# Patient Record
Sex: FEMALE | Race: WHITE | NOT HISPANIC OR LATINO | Employment: OTHER | URBAN - METROPOLITAN AREA
[De-identification: names, ages, dates, MRNs, and addresses within clinical notes are randomized per-mention and may not be internally consistent; named-entity substitution may affect disease eponyms.]

---

## 2017-02-04 ENCOUNTER — HOSPITAL ENCOUNTER (EMERGENCY)
Facility: HOSPITAL | Age: 47
Discharge: HOME | End: 2017-02-04
Attending: EMERGENCY MEDICINE | Admitting: EMERGENCY MEDICINE
Payer: COMMERCIAL

## 2017-02-04 ENCOUNTER — APPOINTMENT (EMERGENCY)
Dept: RADIOLOGY | Facility: HOSPITAL | Age: 47
End: 2017-02-04
Payer: COMMERCIAL

## 2017-02-04 VITALS
RESPIRATION RATE: 20 BRPM | TEMPERATURE: 98.6 F | HEART RATE: 108 BPM | WEIGHT: 244 LBS | DIASTOLIC BLOOD PRESSURE: 108 MMHG | OXYGEN SATURATION: 97 % | SYSTOLIC BLOOD PRESSURE: 151 MMHG

## 2017-02-04 DIAGNOSIS — R00.0 TACHYCARDIA: ICD-10-CM

## 2017-02-04 DIAGNOSIS — F41.9 ANXIETY: Primary | ICD-10-CM

## 2017-02-04 LAB
ANION GAP SERPL CALCULATED.3IONS-SCNC: 11 MMOL/L (ref 4–13)
BASOPHILS # BLD AUTO: 0.04 THOUSANDS/ΜL (ref 0–0.1)
BASOPHILS NFR BLD AUTO: 1 % (ref 0–1)
BUN SERPL-MCNC: 14 MG/DL (ref 5–25)
CALCIUM SERPL-MCNC: 9.2 MG/DL (ref 8.3–10.1)
CHLORIDE SERPL-SCNC: 106 MMOL/L (ref 100–108)
CO2 SERPL-SCNC: 25 MMOL/L (ref 21–32)
CREAT SERPL-MCNC: 1.01 MG/DL (ref 0.6–1.3)
EOSINOPHIL # BLD AUTO: 0.18 THOUSAND/ΜL (ref 0–0.61)
EOSINOPHIL NFR BLD AUTO: 2 % (ref 0–6)
ERYTHROCYTE [DISTWIDTH] IN BLOOD BY AUTOMATED COUNT: 13.9 % (ref 11.6–15.1)
GFR SERPL CREATININE-BSD FRML MDRD: 58.5 ML/MIN/1.73SQ M
GLUCOSE SERPL-MCNC: 133 MG/DL (ref 65–140)
HCT VFR BLD AUTO: 43.6 % (ref 34.8–46.1)
HGB BLD-MCNC: 14 G/DL (ref 11.5–15.4)
LYMPHOCYTES # BLD AUTO: 2.13 THOUSANDS/ΜL (ref 0.6–4.47)
LYMPHOCYTES NFR BLD AUTO: 25 % (ref 14–44)
MAGNESIUM SERPL-MCNC: 2.2 MG/DL (ref 1.6–2.6)
MCH RBC QN AUTO: 27.9 PG (ref 26.8–34.3)
MCHC RBC AUTO-ENTMCNC: 32.1 G/DL (ref 31.4–37.4)
MCV RBC AUTO: 87 FL (ref 82–98)
MONOCYTES # BLD AUTO: 0.66 THOUSAND/ΜL (ref 0.17–1.22)
MONOCYTES NFR BLD AUTO: 8 % (ref 4–12)
NEUTROPHILS # BLD AUTO: 5.35 THOUSANDS/ΜL (ref 1.85–7.62)
NEUTS SEG NFR BLD AUTO: 64 % (ref 43–75)
NRBC BLD AUTO-RTO: 0 /100 WBCS
PLATELET # BLD AUTO: 297 THOUSANDS/UL (ref 149–390)
PMV BLD AUTO: 10 FL (ref 8.9–12.7)
POTASSIUM SERPL-SCNC: 3.7 MMOL/L (ref 3.5–5.3)
RBC # BLD AUTO: 5.02 MILLION/UL (ref 3.81–5.12)
SODIUM SERPL-SCNC: 142 MMOL/L (ref 136–145)
SPECIMEN SOURCE: NORMAL
TROPONIN I BLD-MCNC: 0 NG/ML (ref 0–0.08)
TSH SERPL DL<=0.05 MIU/L-ACNC: 0.8 UIU/ML (ref 0.36–3.74)
WBC # BLD AUTO: 8.37 THOUSAND/UL (ref 4.31–10.16)

## 2017-02-04 PROCEDURE — 80048 BASIC METABOLIC PNL TOTAL CA: CPT

## 2017-02-04 PROCEDURE — 36415 COLL VENOUS BLD VENIPUNCTURE: CPT

## 2017-02-04 PROCEDURE — 83735 ASSAY OF MAGNESIUM: CPT | Performed by: EMERGENCY MEDICINE

## 2017-02-04 PROCEDURE — 99285 EMERGENCY DEPT VISIT HI MDM: CPT

## 2017-02-04 PROCEDURE — 71010 HB CHEST X-RAY 1 VIEW FRONTAL (PORTABLE): CPT

## 2017-02-04 PROCEDURE — 85025 COMPLETE CBC W/AUTO DIFF WBC: CPT

## 2017-02-04 PROCEDURE — 84443 ASSAY THYROID STIM HORMONE: CPT | Performed by: EMERGENCY MEDICINE

## 2017-02-04 PROCEDURE — 84484 ASSAY OF TROPONIN QUANT: CPT

## 2017-02-04 PROCEDURE — 93005 ELECTROCARDIOGRAM TRACING: CPT

## 2017-02-04 PROCEDURE — 96360 HYDRATION IV INFUSION INIT: CPT

## 2017-02-04 PROCEDURE — 36415 COLL VENOUS BLD VENIPUNCTURE: CPT | Performed by: EMERGENCY MEDICINE

## 2017-02-04 RX ORDER — LORAZEPAM 1 MG/1
2 TABLET ORAL ONCE
Status: COMPLETED | OUTPATIENT
Start: 2017-02-04 | End: 2017-02-04

## 2017-02-04 RX ADMIN — LORAZEPAM 2 MG: 1 TABLET ORAL at 17:31

## 2017-02-04 RX ADMIN — SODIUM CHLORIDE 1000 ML: 0.9 INJECTION, SOLUTION INTRAVENOUS at 16:44

## 2017-02-05 LAB
ATRIAL RATE: 241 BPM
QRS AXIS: 1 DEGREES
QRSD INTERVAL: 64 MS
QT INTERVAL: 290 MS
QTC INTERVAL: 406 MS
T WAVE AXIS: 32 DEGREES
VENTRICULAR RATE: 118 BPM

## 2017-02-17 ENCOUNTER — ALLSCRIPTS OFFICE VISIT (OUTPATIENT)
Dept: OTHER | Facility: OTHER | Age: 47
End: 2017-02-17

## 2017-02-17 DIAGNOSIS — F41.9 ANXIETY DISORDER: ICD-10-CM

## 2017-02-17 DIAGNOSIS — E66.01 MORBID (SEVERE) OBESITY DUE TO EXCESS CALORIES (HCC): ICD-10-CM

## 2017-02-17 DIAGNOSIS — F32.9 MAJOR DEPRESSIVE DISORDER, SINGLE EPISODE: ICD-10-CM

## 2017-02-17 DIAGNOSIS — R60.0 LOCALIZED EDEMA: ICD-10-CM

## 2017-02-17 DIAGNOSIS — I10 ESSENTIAL (PRIMARY) HYPERTENSION: ICD-10-CM

## 2017-02-20 ENCOUNTER — APPOINTMENT (OUTPATIENT)
Dept: LAB | Facility: CLINIC | Age: 47
End: 2017-02-20
Payer: COMMERCIAL

## 2017-02-20 ENCOUNTER — TRANSCRIBE ORDERS (OUTPATIENT)
Dept: LAB | Facility: CLINIC | Age: 47
End: 2017-02-20

## 2017-02-20 DIAGNOSIS — F41.9 ANXIETY DISORDER: ICD-10-CM

## 2017-02-20 DIAGNOSIS — F32.9 MAJOR DEPRESSIVE DISORDER, SINGLE EPISODE: ICD-10-CM

## 2017-02-20 DIAGNOSIS — R60.0 LOCALIZED EDEMA: ICD-10-CM

## 2017-02-20 DIAGNOSIS — E66.01 MORBID (SEVERE) OBESITY DUE TO EXCESS CALORIES (HCC): ICD-10-CM

## 2017-02-20 DIAGNOSIS — I10 ESSENTIAL (PRIMARY) HYPERTENSION: ICD-10-CM

## 2017-02-20 LAB
25(OH)D3 SERPL-MCNC: 7.4 NG/ML (ref 30–100)
ALBUMIN SERPL BCP-MCNC: 3.6 G/DL (ref 3.5–5)
ALP SERPL-CCNC: 112 U/L (ref 46–116)
ALT SERPL W P-5'-P-CCNC: 16 U/L (ref 12–78)
ANION GAP SERPL CALCULATED.3IONS-SCNC: 6 MMOL/L (ref 4–13)
AST SERPL W P-5'-P-CCNC: 14 U/L (ref 5–45)
BASOPHILS # BLD AUTO: 0.06 THOUSANDS/ΜL (ref 0–0.1)
BASOPHILS NFR BLD AUTO: 1 % (ref 0–1)
BILIRUB SERPL-MCNC: 0.34 MG/DL (ref 0.2–1)
BUN SERPL-MCNC: 20 MG/DL (ref 5–25)
CALCIUM SERPL-MCNC: 9.2 MG/DL (ref 8.3–10.1)
CHLORIDE SERPL-SCNC: 100 MMOL/L (ref 100–108)
CHOLEST SERPL-MCNC: 176 MG/DL (ref 50–200)
CO2 SERPL-SCNC: 32 MMOL/L (ref 21–32)
CREAT SERPL-MCNC: 0.89 MG/DL (ref 0.6–1.3)
CREAT UR-MCNC: 119 MG/DL
EOSINOPHIL # BLD AUTO: 0.27 THOUSAND/ΜL (ref 0–0.61)
EOSINOPHIL NFR BLD AUTO: 4 % (ref 0–6)
ERYTHROCYTE [DISTWIDTH] IN BLOOD BY AUTOMATED COUNT: 13.6 % (ref 11.6–15.1)
EST. AVERAGE GLUCOSE BLD GHB EST-MCNC: 114 MG/DL
GFR SERPL CREATININE-BSD FRML MDRD: >60 ML/MIN/1.73SQ M
GLUCOSE SERPL-MCNC: 81 MG/DL (ref 65–140)
HBA1C MFR BLD: 5.6 % (ref 4.2–6.3)
HCT VFR BLD AUTO: 45.1 % (ref 34.8–46.1)
HDLC SERPL-MCNC: 57 MG/DL (ref 40–60)
HGB BLD-MCNC: 14.7 G/DL (ref 11.5–15.4)
LDLC SERPL CALC-MCNC: 97 MG/DL (ref 0–100)
LYMPHOCYTES # BLD AUTO: 1.89 THOUSANDS/ΜL (ref 0.6–4.47)
LYMPHOCYTES NFR BLD AUTO: 31 % (ref 14–44)
MCH RBC QN AUTO: 28.4 PG (ref 26.8–34.3)
MCHC RBC AUTO-ENTMCNC: 32.6 G/DL (ref 31.4–37.4)
MCV RBC AUTO: 87 FL (ref 82–98)
MICROALBUMIN UR-MCNC: 8.6 MG/L (ref 0–20)
MICROALBUMIN/CREAT 24H UR: 7 MG/G CREATININE (ref 0–30)
MONOCYTES # BLD AUTO: 0.46 THOUSAND/ΜL (ref 0.17–1.22)
MONOCYTES NFR BLD AUTO: 8 % (ref 4–12)
NEUTROPHILS # BLD AUTO: 3.46 THOUSANDS/ΜL (ref 1.85–7.62)
NEUTS SEG NFR BLD AUTO: 56 % (ref 43–75)
NRBC BLD AUTO-RTO: 0 /100 WBCS
PLATELET # BLD AUTO: 305 THOUSANDS/UL (ref 149–390)
PMV BLD AUTO: 10.5 FL (ref 8.9–12.7)
POTASSIUM SERPL-SCNC: 3.6 MMOL/L (ref 3.5–5.3)
PROT SERPL-MCNC: 7.4 G/DL (ref 6.4–8.2)
RBC # BLD AUTO: 5.18 MILLION/UL (ref 3.81–5.12)
SODIUM SERPL-SCNC: 138 MMOL/L (ref 136–145)
T4 FREE SERPL-MCNC: 0.78 NG/DL (ref 0.76–1.46)
TRIGL SERPL-MCNC: 110 MG/DL
TSH SERPL DL<=0.05 MIU/L-ACNC: 1.27 UIU/ML (ref 0.36–3.74)
WBC # BLD AUTO: 6.14 THOUSAND/UL (ref 4.31–10.16)

## 2017-02-20 PROCEDURE — 83036 HEMOGLOBIN GLYCOSYLATED A1C: CPT

## 2017-02-20 PROCEDURE — 84443 ASSAY THYROID STIM HORMONE: CPT

## 2017-02-20 PROCEDURE — 84439 ASSAY OF FREE THYROXINE: CPT

## 2017-02-20 PROCEDURE — 82570 ASSAY OF URINE CREATININE: CPT

## 2017-02-20 PROCEDURE — 36415 COLL VENOUS BLD VENIPUNCTURE: CPT

## 2017-02-20 PROCEDURE — 80061 LIPID PANEL: CPT

## 2017-02-20 PROCEDURE — 80053 COMPREHEN METABOLIC PANEL: CPT

## 2017-02-20 PROCEDURE — 82043 UR ALBUMIN QUANTITATIVE: CPT

## 2017-02-20 PROCEDURE — 82306 VITAMIN D 25 HYDROXY: CPT

## 2017-02-20 PROCEDURE — 85025 COMPLETE CBC W/AUTO DIFF WBC: CPT

## 2017-02-23 ENCOUNTER — HOSPITAL ENCOUNTER (EMERGENCY)
Facility: HOSPITAL | Age: 47
Discharge: HOME | End: 2017-02-23
Attending: EMERGENCY MEDICINE
Payer: COMMERCIAL

## 2017-02-23 VITALS
SYSTOLIC BLOOD PRESSURE: 105 MMHG | DIASTOLIC BLOOD PRESSURE: 74 MMHG | WEIGHT: 244 LBS | HEART RATE: 104 BPM | OXYGEN SATURATION: 95 % | BODY MASS INDEX: 43.22 KG/M2 | TEMPERATURE: 99.1 F | RESPIRATION RATE: 18 BRPM

## 2017-02-23 DIAGNOSIS — R68.84 MAXILLA PAIN: Primary | ICD-10-CM

## 2017-02-23 DIAGNOSIS — J06.9 VIRAL URI WITH COUGH: ICD-10-CM

## 2017-02-23 PROCEDURE — 94640 AIRWAY INHALATION TREATMENT: CPT

## 2017-02-23 PROCEDURE — 99283 EMERGENCY DEPT VISIT LOW MDM: CPT

## 2017-02-23 RX ADMIN — ALBUTEROL SULFATE 5 MG: 2.5 SOLUTION RESPIRATORY (INHALATION) at 19:08

## 2017-02-23 RX ADMIN — IPRATROPIUM BROMIDE 0.5 MG: 0.5 SOLUTION RESPIRATORY (INHALATION) at 19:08

## 2017-02-25 ENCOUNTER — GENERIC CONVERSION - ENCOUNTER (OUTPATIENT)
Dept: OTHER | Facility: OTHER | Age: 47
End: 2017-02-25

## 2017-02-27 ENCOUNTER — ALLSCRIPTS OFFICE VISIT (OUTPATIENT)
Dept: OTHER | Facility: OTHER | Age: 47
End: 2017-02-27

## 2017-06-07 ENCOUNTER — ALLSCRIPTS OFFICE VISIT (OUTPATIENT)
Dept: OTHER | Facility: OTHER | Age: 47
End: 2017-06-07

## 2017-06-07 DIAGNOSIS — Z12.31 ENCOUNTER FOR SCREENING MAMMOGRAM FOR MALIGNANT NEOPLASM OF BREAST: ICD-10-CM

## 2017-06-07 DIAGNOSIS — E66.01 MORBID (SEVERE) OBESITY DUE TO EXCESS CALORIES (HCC): ICD-10-CM

## 2017-10-13 ENCOUNTER — ALLSCRIPTS OFFICE VISIT (OUTPATIENT)
Dept: OTHER | Facility: OTHER | Age: 47
End: 2017-10-13

## 2017-10-16 ENCOUNTER — GENERIC CONVERSION - ENCOUNTER (OUTPATIENT)
Dept: OTHER | Facility: OTHER | Age: 47
End: 2017-10-16

## 2018-01-10 ENCOUNTER — GENERIC CONVERSION - ENCOUNTER (OUTPATIENT)
Dept: OTHER | Facility: OTHER | Age: 48
End: 2018-01-10

## 2018-01-11 NOTE — RESULT NOTES
Verified Results  (1) CBC/PLT/DIFF [id number] 08:44AM Silviano Ron   TW Order Number: [id number]     Test Name Result Flag Reference   WBC COUNT 6 14 Thousand/uL  4 31-10 16   RBC COUNT 5 18 Million/uL H 3 81-5 12   HEMOGLOBIN 14 7 g/dL  11 5-15 4   HEMATOCRIT 45 1 %  34 8-46  1   MCV 87 fL  82-98   MCH 28 4 pg  26 8-34 3   MCHC 32 6 g/dL  31 4-37 4   RDW 13 6 %  11 6-15 1   MPV 10 5 fL  8 9-12 7   PLATELET COUNT 437 Thousands/uL  149-390   nRBC AUTOMATED 0 /100 WBCs     NEUTROPHILS RELATIVE PERCENT 56 %  43-75   LYMPHOCYTES RELATIVE PERCENT 31 %  14-44   MONOCYTES RELATIVE PERCENT 8 %  4-12   EOSINOPHILS RELATIVE PERCENT 4 %  0-6   BASOPHILS RELATIVE PERCENT 1 %  0-1   NEUTROPHILS ABSOLUTE COUNT 3 46 Thousands/? ??L  1 85-7 62   LYMPHOCYTES ABSOLUTE COUNT 1 89 Thousands/? ??L  0 60-4 47   MONOCYTES ABSOLUTE COUNT 0 46 Thousand/? ??L  0 17-1 22   EOSINOPHILS ABSOLUTE COUNT 0 27 Thousand/? ??L  0 00-0 61   BASOPHILS ABSOLUTE COUNT 0 06 Thousands/? ??L  0 00-0 10   - Patient Instructions: This bloodwork is non-fasting  Please drink two glasses of water morning of bloodwork  - Patient Instructions: This bloodwork is non-fasting  Please drink two glasses of water morning of bloodwork  (1) COMPREHENSIVE METABOLIC PANEL [id number] 46:65GN Lauren Lamasmadhu Order Number: [id number]     Test Name Result Flag Reference   GLUCOSE,RANDM 81 mg/dL     If the patient is fasting, the ADA then defines impaired fasting glucose as > 100 mg/dL and diabetes as > or equal to 123 mg/dL     SODIUM 138 mmol/L  136-145   POTASSIUM 3 6 mmol/L  3 5-5 3   CHLORIDE 100 mmol/L  100-108   CARBON DIOXIDE 32 mmol/L  21-32   ANION GAP (CALC) 6 mmol/L  4-13   BLOOD UREA NITROGEN 20 mg/dL  5-25   CREATININE 0 89 mg/dL  0 60-1 30   Standardized to IDMS reference method   CALCIUM 9 2 mg/dL  8 3-10 1   BILI, TOTAL 0 34 mg/dL  0 20-1 00   ALK PHOSPHATAS 112 U/L     ALT (SGPT) 16 U/L  12-78   AST(SGOT) 14 U/L  5-45 ALBUMIN 3 6 g/dL  3 5-5 0   TOTAL PROTEIN 7 4 g/dL  6 4-8 2   eGFR Non-African American      >60 0 ml/min/1 73sq m   - Patient Instructions: This is a fasting blood test  Water, black tea or black coffee only after 9:00pm the night before test Drink 2 glasses of water the morning of test - Patient Instructions: This bloodwork is non-fasting  Please drink two glasses of   water morning of bloodwork  National Kidney Disease Education Program recommendations are as follows:  GFR calculation is accurate only with a steady state creatinine  Chronic Kidney disease less than 60 ml/min/1 73 sq  meters  Kidney failure less than 15 ml/min/1 73 sq  meters  (1) HEMOGLOBIN A1C [id number] 08:44AM Colton FARRAR Order Number: [id number]     Test Name Result Flag Reference   HEMOGLOBIN A1C 5 6 %  4 2-6 3   EST  AVG  GLUCOSE 114 mg/dl       (1) LIPID PANEL FASTING W DIRECT LDL REFLEX [id number] 08:44AM Jeffry Louis Order Number: [id number]     Test Name Result Flag Reference   CHOLESTEROL 176 mg/dL     LDL CHOLESTEROL CALCULATED 97 mg/dL  0-100   - Patient Instructions: This is a fasting blood test  Water, black tea or black coffee only after 9:00pm the night before test   Drink 2 glasses of water the morning of test     - Patient Instructions: This is a fasting blood test  Water, black tea or black coffee only after 9:00pm the night before test Drink 2 glasses of water the morning of test - Patient Instructions: This bloodwork is non-fasting  Please drink two glasses of   water morning of bloodwork    Triglyceride:         Normal              <150 mg/dl       Borderline High    150-199 mg/dl       High               200-499 mg/dl       Very High          >499 mg/dl  Cholesterol:         Desirable        <200 mg/dl      Borderline High  200-239 mg/dl      High             >239 mg/dl  HDL Cholesterol:        High    >59 mg/dL      Low     <41 mg/dL  LDL Cholesterol:        Optimal          <100 mg/dl        Near Optimal     100-129 mg/dl        Above Optimal          Borderline High   130-159 mg/dl          High              160-189 mg/dl          Very High        >189 mg/dl  LDL CALCULATED:    This screening LDL is a calculated result  It does not have the accuracy of the Direct Measured LDL in the monitoring of patients with hyperlipidemia and/or statin therapy  Direct Measure LDL (ISD817) must be ordered separately in these patients  TRIGLYCERIDES 110 mg/dL  <=150   Specimen collection should occur prior to N-Acetylcysteine or Metamizole administration due to the potential for falsely depressed results  HDL,DIRECT 57 mg/dL  40-60   Specimen collection should occur prior to Metamizole administration due to the potential for falsely depressed results  (1) MICROALBUMIN CREATININE RATIO, RANDOM URINE [id number] 08:44AM [facility] Order Number: [id number]     Test Name Result Flag Reference   MICROALBUMIN/ CREAT R 7 mg/g creatinine  0-30   MICROALBUMIN,URINE 8 6 mg/L  0 0-20 0   CREATININE URINE 119 0 mg/dL       (1) T4, FREE [id number] 08:44AM Saida Mathew    Order Number: [id number]     Test Name Result Flag Reference   T4,FREE 0 78 ng/dL  0 76-1 46   - Patient Instructions: This is a fasting blood test  Water, black tea or black coffee only after 9:00pm the night before test Drink 2 glasses of water the morning of test - Patient Instructions: This bloodwork is non-fasting  Please drink two glasses of   water morning of bloodwork  (1) TSH [id number] 08:44AM Aviir    Order Number: [id number]     Test Name Result Flag Reference   TSH 1 270 uIU/mL  0 358-3 740   - Patient Instructions: This bloodwork is non-fasting  Please drink two glasses of water morning of bloodwork  - Patient Instructions:  This is a fasting blood test  Water, black tea or black coffee only after 9:00pm the night before test Drink 2 glasses of water the morning of test - Patient Instructions: This bloodwork is non-fasting  Please drink two glasses of   water morning of bloodwork  Patients undergoing fluorescein dye angiography may retain small amounts of fluorescein in the body for 48-72 hours post procedure  Samples containing fluorescein can produce falsely depressed TSH values  If the patient had this procedure,a specimen should be resubmitted post fluorescein clearance  The recommended reference ranges for TSH during pregnancy are as follows:  First trimester 0 1 to 2 5 uIU/mL  Second trimester  0 2 to 3 0 uIU/mL  Third trimester 0 3 to 3 0 uIU/m     (1) VITAMIN D 25-HYDROXY [id number] 08:44AM Teresa Gutierrez    Order Number: [id number]     Test Name Result Flag Reference   VIT D 25-HYDROX 7 4 ng/mL L 30 0-100 0   This assay is a certified procedure of the CDC Vitamin D Standardization Certification Program (VDSCP)     Deficiency <20ng/ml   Insufficiency 20-30ng/ml   Sufficient  ng/ml     *Patients undergoing fluorescein dye angiography may retain small amounts of fluorescein in the body for 48-72 hours post procedure  Samples containing fluorescein can produce falsely elevated Vitamin D values  If the patient had this procedure, a specimen should be resubmitted post fluorescein clearance  Plan  Vitamin D deficiency    · Vitamin D (Ergocalciferol) 99006 UNIT Oral Capsule; TAKE 1 CAPSULE WEEKLY    Discussion/Summary  Please inform patient that all of her lab results are normal except for her vitamin D, which is very low  I have prescribed vitamin D 87950 unit supplement which she should take once per week       Signatures   Electronically signed by : RUBY Cabrera; Feb 25 2017  5:53PM EST                       (Author)

## 2018-01-12 VITALS
DIASTOLIC BLOOD PRESSURE: 80 MMHG | RESPIRATION RATE: 18 BRPM | HEIGHT: 63 IN | OXYGEN SATURATION: 96 % | BODY MASS INDEX: 42.52 KG/M2 | HEART RATE: 88 BPM | SYSTOLIC BLOOD PRESSURE: 110 MMHG | WEIGHT: 240 LBS | TEMPERATURE: 97 F

## 2018-01-13 VITALS
HEIGHT: 63 IN | BODY MASS INDEX: 41.64 KG/M2 | SYSTOLIC BLOOD PRESSURE: 100 MMHG | OXYGEN SATURATION: 96 % | RESPIRATION RATE: 20 BRPM | HEART RATE: 88 BPM | WEIGHT: 235 LBS | TEMPERATURE: 97 F | DIASTOLIC BLOOD PRESSURE: 70 MMHG

## 2018-01-14 VITALS
DIASTOLIC BLOOD PRESSURE: 116 MMHG | WEIGHT: 244.44 LBS | BODY MASS INDEX: 43.31 KG/M2 | RESPIRATION RATE: 18 BRPM | HEIGHT: 63 IN | SYSTOLIC BLOOD PRESSURE: 150 MMHG | OXYGEN SATURATION: 96 % | HEART RATE: 119 BPM | TEMPERATURE: 97.7 F

## 2018-01-15 NOTE — MISCELLANEOUS
FIRST WARNING     Dear Heena Lloyd:     You have had _1_ No-Show appointments at our office (10/13/17 340PM)   A No-Show is defined as any patient who fails to arrive for a scheduled appointment without canceling the appointment prior to the scheduled time  A patient who has more than THREE No-Shows may be dismissed from an [address] [place] [address] practice  Please call in advance to cancel appointments  If you continue to No-Show for scheduled appointments, you may be dismissed from our practice  Thank  You  Usted ha tenido _1_  No-Show citas en nuestra oficina (10/13/17 340PM)  Un No-Show se define sheba cualquier paciente que no llega a yariel tony programada sin cancelar la tony antes de la hora programada  Un paciente que tiene más de  ROWDY No-Show puede ser despedido de un [facility] práctica  Por favor llame con anticipación para cancelar citas  Si continúa la "No-Show" para  las citas programadas, usted puede ser despedido de nuestra práctica  Evonne

## 2018-01-16 NOTE — MISCELLANEOUS
Provider Comments  Provider Comments:   Patient did not show for 3:40pm appt        Signatures   Electronically signed by : Eric Meredith, [address]; Oct 15 2017  1:04PM EST                       (Author)

## 2018-01-23 NOTE — MISCELLANEOUS
Please contact our office at your convenience  It is important that we speak to you  REGARDING:   Porfavor contacte a  nuestra oficina  Es muy importante que hablemos con usted   SOBRE:         X Scheduling an  Appointment/ Programar yariel Karie          Rescheduling an Appointment/ Re-programar yariel Karie     Cancelling an Appointment/Cancelar hanson Karie     Your Lab/ X-ray Results/Resultados         X Updating your Phone number or Address/ Actualizar hanson [employer]           Verify your Primary Providor/ Verificar hanson Proveedor  primario       X Other/Otro: WILL NEED TO BE SEEN FOR FURTHER REFILLS     Please Contact Our Office to Schedule Your Appointment  It is Very Important That You See Your Provider for your  Routine Medical Care  If you are seeing a New Providor,  Please Contact our Office so we can update our Records  Thank You  Comuníquese con nuestra oficina para programar hanson  karie  Es Muy Importante que usted hany hanson proveedor para hanson   [address] [facility] Floor de [place]  Si usted esta viendo un Proveedor  [place], [place] con Martell Robin oficina para  actualizar   nuestro registros  Evonne

## 2019-11-20 ENCOUNTER — APPOINTMENT (OUTPATIENT)
Dept: LAB | Facility: CLINIC | Age: 49
End: 2019-11-20
Payer: COMMERCIAL

## 2019-11-20 ENCOUNTER — TELEPHONE (OUTPATIENT)
Dept: FAMILY MEDICINE CLINIC | Facility: CLINIC | Age: 49
End: 2019-11-20

## 2019-11-20 ENCOUNTER — TELEPHONE (OUTPATIENT)
Dept: OTHER | Facility: HOSPITAL | Age: 49
End: 2019-11-20

## 2019-11-20 ENCOUNTER — TRANSCRIBE ORDERS (OUTPATIENT)
Dept: LAB | Facility: CLINIC | Age: 49
End: 2019-11-20

## 2019-11-20 ENCOUNTER — OFFICE VISIT (OUTPATIENT)
Dept: FAMILY MEDICINE CLINIC | Facility: CLINIC | Age: 49
End: 2019-11-20

## 2019-11-20 ENCOUNTER — TELEPHONE (OUTPATIENT)
Dept: OTHER | Facility: OTHER | Age: 49
End: 2019-11-20

## 2019-11-20 VITALS
HEART RATE: 115 BPM | WEIGHT: 229 LBS | TEMPERATURE: 97.5 F | HEIGHT: 64 IN | OXYGEN SATURATION: 98 % | BODY MASS INDEX: 39.09 KG/M2 | DIASTOLIC BLOOD PRESSURE: 74 MMHG | SYSTOLIC BLOOD PRESSURE: 128 MMHG | RESPIRATION RATE: 18 BRPM

## 2019-11-20 DIAGNOSIS — B35.1 ONYCHOMYCOSIS: ICD-10-CM

## 2019-11-20 DIAGNOSIS — E66.9 OBESITY (BMI 35.0-39.9 WITHOUT COMORBIDITY): ICD-10-CM

## 2019-11-20 DIAGNOSIS — Z79.899 ENCOUNTER FOR LONG-TERM (CURRENT) USE OF OTHER MEDICATIONS: Primary | ICD-10-CM

## 2019-11-20 DIAGNOSIS — Z79.899 ENCOUNTER FOR LONG-TERM (CURRENT) USE OF OTHER MEDICATIONS: ICD-10-CM

## 2019-11-20 DIAGNOSIS — Z00.00 ENCOUNTER FOR MEDICAL EXAMINATION TO ESTABLISH CARE: Primary | ICD-10-CM

## 2019-11-20 DIAGNOSIS — Z23 FLU VACCINE NEED: ICD-10-CM

## 2019-11-20 DIAGNOSIS — Z12.31 SCREENING MAMMOGRAM, ENCOUNTER FOR: ICD-10-CM

## 2019-11-20 DIAGNOSIS — Z12.11 COLON CANCER SCREENING: ICD-10-CM

## 2019-11-20 LAB
25(OH)D3 SERPL-MCNC: 22.6 NG/ML (ref 30–100)
ALBUMIN SERPL BCP-MCNC: 3.9 G/DL (ref 3.5–5)
ALP SERPL-CCNC: 115 U/L (ref 46–116)
ALT SERPL W P-5'-P-CCNC: 11 U/L (ref 12–78)
ANION GAP SERPL CALCULATED.3IONS-SCNC: 10 MMOL/L (ref 4–13)
AST SERPL W P-5'-P-CCNC: 15 U/L (ref 5–45)
BASOPHILS # BLD AUTO: 0.12 THOUSANDS/ΜL (ref 0–0.1)
BASOPHILS NFR BLD AUTO: 2 % (ref 0–1)
BILIRUB SERPL-MCNC: 0.32 MG/DL (ref 0.2–1)
BUN SERPL-MCNC: 17 MG/DL (ref 5–25)
CALCIUM SERPL-MCNC: 8.9 MG/DL (ref 8.3–10.1)
CHLORIDE SERPL-SCNC: 108 MMOL/L (ref 100–108)
CHOLEST SERPL-MCNC: 154 MG/DL (ref 50–200)
CO2 SERPL-SCNC: 25 MMOL/L (ref 21–32)
CREAT SERPL-MCNC: 0.72 MG/DL (ref 0.6–1.3)
EOSINOPHIL # BLD AUTO: 0.35 THOUSAND/ΜL (ref 0–0.61)
EOSINOPHIL NFR BLD AUTO: 5 % (ref 0–6)
ERYTHROCYTE [DISTWIDTH] IN BLOOD BY AUTOMATED COUNT: 18.5 % (ref 11.6–15.1)
GFR SERPL CREATININE-BSD FRML MDRD: 97 ML/MIN/1.73SQ M
GLUCOSE P FAST SERPL-MCNC: 85 MG/DL (ref 65–99)
HCT VFR BLD AUTO: 25.2 % (ref 34.8–46.1)
HDLC SERPL-MCNC: 67 MG/DL
HGB BLD-MCNC: 6 G/DL (ref 11.5–15.4)
IMM GRANULOCYTES # BLD AUTO: 0.03 THOUSAND/UL (ref 0–0.2)
IMM GRANULOCYTES NFR BLD AUTO: 0 % (ref 0–2)
LDLC SERPL CALC-MCNC: 77 MG/DL (ref 0–100)
LYMPHOCYTES # BLD AUTO: 1.65 THOUSANDS/ΜL (ref 0.6–4.47)
LYMPHOCYTES NFR BLD AUTO: 22 % (ref 14–44)
MCH RBC QN AUTO: 16.2 PG (ref 26.8–34.3)
MCHC RBC AUTO-ENTMCNC: 23.8 G/DL (ref 31.4–37.4)
MCV RBC AUTO: 68 FL (ref 82–98)
MONOCYTES # BLD AUTO: 0.68 THOUSAND/ΜL (ref 0.17–1.22)
MONOCYTES NFR BLD AUTO: 9 % (ref 4–12)
NEUTROPHILS # BLD AUTO: 4.68 THOUSANDS/ΜL (ref 1.85–7.62)
NEUTS SEG NFR BLD AUTO: 62 % (ref 43–75)
NONHDLC SERPL-MCNC: 87 MG/DL
NRBC BLD AUTO-RTO: 0 /100 WBCS
PLATELET # BLD AUTO: 320 THOUSANDS/UL (ref 149–390)
PMV BLD AUTO: 9.9 FL (ref 8.9–12.7)
POTASSIUM SERPL-SCNC: 3.9 MMOL/L (ref 3.5–5.3)
PROT SERPL-MCNC: 7.3 G/DL (ref 6.4–8.2)
RBC # BLD AUTO: 3.7 MILLION/UL (ref 3.81–5.12)
SODIUM SERPL-SCNC: 143 MMOL/L (ref 136–145)
T4 SERPL-MCNC: 7.3 UG/DL (ref 4.7–13.3)
TRIGL SERPL-MCNC: 51 MG/DL
TSH SERPL DL<=0.05 MIU/L-ACNC: 1.39 UIU/ML (ref 0.36–3.74)
WBC # BLD AUTO: 7.51 THOUSAND/UL (ref 4.31–10.16)

## 2019-11-20 PROCEDURE — 80061 LIPID PANEL: CPT

## 2019-11-20 PROCEDURE — 90682 RIV4 VACC RECOMBINANT DNA IM: CPT | Performed by: FAMILY MEDICINE

## 2019-11-20 PROCEDURE — 3725F SCREEN DEPRESSION PERFORMED: CPT | Performed by: FAMILY MEDICINE

## 2019-11-20 PROCEDURE — 82306 VITAMIN D 25 HYDROXY: CPT

## 2019-11-20 PROCEDURE — 80053 COMPREHEN METABOLIC PANEL: CPT

## 2019-11-20 PROCEDURE — 84436 ASSAY OF TOTAL THYROXINE: CPT

## 2019-11-20 PROCEDURE — 80307 DRUG TEST PRSMV CHEM ANLYZR: CPT

## 2019-11-20 PROCEDURE — 3008F BODY MASS INDEX DOCD: CPT | Performed by: FAMILY MEDICINE

## 2019-11-20 PROCEDURE — 84443 ASSAY THYROID STIM HORMONE: CPT

## 2019-11-20 PROCEDURE — 85025 COMPLETE CBC W/AUTO DIFF WBC: CPT

## 2019-11-20 PROCEDURE — 99203 OFFICE O/P NEW LOW 30 MIN: CPT | Performed by: FAMILY MEDICINE

## 2019-11-20 PROCEDURE — 90471 IMMUNIZATION ADMIN: CPT | Performed by: FAMILY MEDICINE

## 2019-11-20 PROCEDURE — 36415 COLL VENOUS BLD VENIPUNCTURE: CPT

## 2019-11-20 RX ORDER — LORAZEPAM 0.5 MG/1
0.5 TABLET ORAL DAILY PRN
Refills: 2 | COMMUNITY
Start: 2019-11-05

## 2019-11-20 RX ORDER — QUETIAPINE FUMARATE 100 MG/1
100 TABLET, FILM COATED ORAL
COMMUNITY

## 2019-11-20 RX ORDER — VENLAFAXINE 50 MG/1
225 TABLET ORAL DAILY
COMMUNITY

## 2019-11-20 RX ORDER — VENLAFAXINE 75 MG/1
75 TABLET ORAL 2 TIMES DAILY
COMMUNITY
End: 2019-11-22

## 2019-11-20 NOTE — PATIENT INSTRUCTIONS
Lifestyle Medicine Tip Sheet    1  Eat predominantly less processed foods such as fast food, T V  dinners, and jones  2  Eat Close to [employer], [employer] or [employer]    3  Eat a predominantly plant based diet   a  Dark Leafy Greens  b  Fruits/Vegetables  c  Whole Grains: Whole wheat, barely, wheat berries, quinoa, steel cut oats, brown rice, whole wheat pasta  d  Legumes: kidney beans, jesus beans, white beans, black beans, garbanzo beans (chickpeas), lima beans (mature, dried), split peas, lentils, and edamame (green soybeans)      4  At least half of the plate should contain fruits or vegetables        5  Liquid should be predominantly water  (limit soda and juice)    6  Watch portion size  7  Foods you should avoid or limit?  - Fats  Specifically saturated and trans-fats  They are found in margarines, many fast foods, and some store-bought baked goods  Saturated and Trans-fats can raise your cholesterol level and your chance of getting heart disease    - When you cook, it's best to use no oils but if needed try to limit the amount of oil used as oil contains many calories per volume and is very unhealthy when heated during cooking    - Sugar Limit or avoid sugar, sweets, and refined grains  Refined grains are found in white bread, white rice, most forms of pasta, and most packaged "snack" foods    - Try not to cook with salt and avoid  adding extra salt to your  meals   - Meat  Studies have shown that eating a lot of red meat and poultry can increase your risk of certain health problems, including heart disease, diabetes, obesity and cancer  So try to limit the intake of it  8  Practice good sleep hygiene by getting 7-9 hours of sleep a night    9  Daily exercise minimum of 30 minutes (walking around the block)    10  Socialization (friends and family)   - Explore your neighborhood   Go to the park, spend time at [employer]  - Consider taking a class or volunteering to connect with new people    If you are interested you can read more about healthy food choices at the following websites:  a  [employer]  org  b  Home cooking recipes: https://www Chefmarket.ru/  c  http://carlos info/  d  Familydoctor  org

## 2019-11-20 NOTE — ASSESSMENT & PLAN NOTE
CBC, CMP, TSH, A1c vitamin-D and lipid panel pending  Flu shot and Tdap vaccination given today in office  Referral given for screening mammogram and colonoscopy  Will follow up with gynecology

## 2019-11-20 NOTE — TELEPHONE ENCOUNTER
Pt given info, but wanted orders mailed  OB/Gyn MNP([phone]) is on 12/10/2019 at 9 am at [address], [facility], [place]  Mammo apt([phone]) is on 2/19/20 at 9:30 am at [facility] [place] [address], [facility], [facility], [place]  Podiatry info was faxed to [phone] today and they will call pt for an apt  This is the process podiatry is requesting we follow

## 2019-11-20 NOTE — PROGRESS NOTES
Assessment/Plan:    Encounter for medical examination to establish care  CBC, CMP, TSH, A1c vitamin-D and lipid panel pending  Flu shot and Tdap vaccination given today in office  Referral given for screening mammogram and colonoscopy  Will follow up with gynecology  Onychomycosis  Elongated toenail  1+ DP pulses bilaterally  Onychomycosis  Referral for podiatry given, may benefit from nail biopsy and trim  CMP pending    Obesity (BMI 35 0-39 9 without comorbidity)  Counseled patient on the importance of working to achieve weight reduction goal   Discussed benefits of weight loss including prevention or control of comorbidities   Discussed role that balanced diet and daily activity play in weight reduction   Set up small attainable weight loss goals   Involve family, friends, and co-workers for support  Diagnoses and all orders for this visit:    Encounter for medical examination to establish care  -     Ambulatory referral to Obstetrics / Gynecology; Future    Obesity (BMI 35 0-39 9 without comorbidity)    Onychomycosis  -     Ambulatory referral to Podiatry; Future    Screening mammogram, encounter for  -     Mammo screening bilateral w cad; Future    Colon cancer screening  -     Ambulatory referral to General Surgery; Future    Other orders  -     Cancel: Ambulatory referral to Gastroenterology; Future  -     LORazepam (ATIVAN) 0 5 mg tablet; Take 0 5 mg by mouth daily as needed  -     venlafaxine (EFFEXOR) 50 mg tablet; Take 50 mg by mouth 2 (two) times a day  -     venlafaxine (EFFEXOR) 75 mg tablet; Take 75 mg by mouth 2 (two) times a day  -     QUEtiapine (SEROquel) 100 mg tablet; Take 100 mg by mouth daily at bedtime          Subjective:      Patient ID: Aurea Bansal is a 44 y o  female  HPI  68-year-old female presents to clinic to establish care     States she is allergic to Biaxin which results in anaphylaxis  History of gastric bypass 2000, which resulted at 200 lb weight loss  Currently resides at home with her  and 2 children, admits to tobacco use recently began smoking 3 years ago averages half a pack for the past 3 years, understanding quitting at this time  Admits to occasional alcohol use on social events denies any recreational or illicit drug use  Addition complaining of pain in her foot, states she has an elongated toenails which is causing discomfort and is requesting workup  Has no other complaints at this time    The following portions of the patient's history were reviewed and updated as appropriate: allergies, current medications, past family history, past medical history, past social history, past surgical history and problem list     Review of Systems   Constitutional: Negative for activity change, appetite change, fatigue, fever and unexpected weight change  HENT: Negative for sneezing, sore throat and tinnitus  Eyes: Negative for pain  Respiratory: Negative for cough, choking and chest tightness  Cardiovascular: Negative for chest pain, palpitations and leg swelling  Gastrointestinal: Negative for abdominal distention, abdominal pain, constipation, diarrhea, nausea and vomiting  Endocrine: Negative for polydipsia, polyphagia and polyuria  Genitourinary: Negative for dysuria and flank pain  Musculoskeletal: Negative for back pain  Neurological: Negative for dizziness, seizures, syncope, speech difficulty, light-headedness and numbness  Psychiatric/Behavioral: Negative for agitation  Objective:      /74 (BP Location: Right arm, Patient Position: Sitting, Cuff Size: Large)   Pulse (!) 115   Temp 97 5 °F (36 4 °C) (Temporal)   Resp 18   Ht 5' 4" (1 626 m)   Wt 104 kg (229 lb)   SpO2 98%   BMI 39 31 kg/m²          Physical Exam   Constitutional: She is oriented to person, place, and time  No distress  HENT:   Head: Normocephalic and atraumatic     Poor dentition  Obesity  Boggy turbinates   Eyes: Pupils are equal, round, and reactive to light  Neck: Neck supple  No tracheal deviation present  No thyromegaly present  Cardiovascular: Normal rate, regular rhythm and normal heart sounds  No murmur heard  Pulmonary/Chest: Effort normal and breath sounds normal  No respiratory distress  She has no wheezes  Abdominal: Soft  Bowel sounds are normal  She exhibits no distension  There is no tenderness  There is no guarding  Musculoskeletal: Normal range of motion  She exhibits no edema  Neurological: She is alert and oriented to person, place, and time  Skin: Skin is warm  No rash noted  She is not diaphoretic  Psychiatric: She has a normal mood and affect

## 2019-11-20 NOTE — TELEPHONE ENCOUNTER
Sent this Message to Dr Yumiko Arana via [address]  @ [phone]    [phone]/ [facility] from [address] [place] [address] Dr Chandu Ferrer [facility] 35-/ Critical [facility]    Told amel to call back in 20-30 minutes if no call back from Dr Yumiko Arana

## 2019-11-20 NOTE — ASSESSMENT & PLAN NOTE
Elongated toenail  1+ DP pulses bilaterally  Onychomycosis  Referral for podiatry given, may benefit from nail biopsy and trim    CMP pending

## 2019-11-20 NOTE — TELEPHONE ENCOUNTER
Received page from lab that patient has critical lab value  Hemoglobin 6, hematocrit 25  Called patient and discussed the need to go to the emergency room due to severe anemia  On chart review, noted that patient last CBC in 2017 was completely normal   No evidence of anemia  Had a lengthy discussion with patient regarding going to the emergency room immediately  Discussed possible admission to rule out causes of her anemia  Advised to have a family member or friend to take her to the emergency room  Alternatively, call ambulance to be transported  Patient insisted on being given a medication to resolve this issue  Explained to patient that iron supplements will not resolve this issue overnight  She needs to be seen in the emergency room for repeat CBC and more testing  She is at risk of death  Advised patient to go to the ER immediately  Discussed with attending physician who agrees with the plan above  Will keep an eye on her chart  Plan to call patient again in few hours if she has not gone to the emergency room  Will send a message to PCP

## 2019-11-21 LAB
AMPHETAMINES UR QL SCN: NEGATIVE NG/ML
BARBITURATES UR QL SCN: NEGATIVE NG/ML
BENZODIAZ UR QL: NEGATIVE NG/ML
BZE UR QL: NEGATIVE NG/ML
CANNABINOIDS UR QL SCN: NEGATIVE NG/ML
METHADONE UR QL SCN: NEGATIVE NG/ML
OPIATES UR QL: NEGATIVE NG/ML
PCP UR QL: NEGATIVE NG/ML
PROPOXYPH UR QL SCN: NEGATIVE NG/ML

## 2019-11-21 NOTE — TELEPHONE ENCOUNTER
Called patient again in regards to hemoglobin 6 on CBC ordered by PCP today  I had called patient earlier and advised her to go to emergency room for further evaluation and possible admission due to low hemoglobin  Did lengthy chart review on this patient, noted that patient has history of gastric bypass  Patient was seen in office today by PCP, vitals significant for heart rate 115  She also has history of bipolar disorder  Given history of gastric bypass, these patients are generally at risk for gastric ulceration and GI bleed especially if they are taking NSAIDs  Medications reviewed from office visit today  Patient does not appear to be taking NSAIDs  Advised patient to go to the emergency room  Patient stated that she cannot go to ER as she does not have anyone to watch her child  Explained to patient that she is at risk for further bleeding and possible death  Attending physician Dr Richard Monk notified  Dr Richard Monk to contact patient to attempt to convince her to go to emergency room for further evaluation

## 2019-11-21 NOTE — TELEPHONE ENCOUNTER
Called patient again to let her know of critical hemoglobin  She did admit she has been extremely tired recently  When asked, she admitted she has been taking 2 tabs of ibuprofen for headaches about once or twice per week for the past 3 months  She did mention she had gastric bypass a few years ago  Has not been following with anyone  Emphasized the importance of going to the hospital for possible blood transfusion and further evaluation  Patient mentioned she is waiting for her  to return home in the next 1-2 hours  I offered to call 911 but patient refused and said she has a 15year old to care for  I once again stressed the importance of her being evaluated tonight  She told me she will try

## 2019-11-22 ENCOUNTER — HOSPITAL ENCOUNTER (EMERGENCY)
Facility: HOSPITAL | Age: 49
Discharge: HOME | End: 2019-11-22
Attending: EMERGENCY MEDICINE | Admitting: EMERGENCY MEDICINE
Payer: COMMERCIAL

## 2019-11-22 ENCOUNTER — APPOINTMENT (EMERGENCY)
Dept: RADIOLOGY | Facility: HOSPITAL | Age: 49
End: 2019-11-22
Payer: COMMERCIAL

## 2019-11-22 VITALS
RESPIRATION RATE: 16 BRPM | OXYGEN SATURATION: 100 % | TEMPERATURE: 98.5 F | HEIGHT: 63 IN | BODY MASS INDEX: 40.57 KG/M2 | WEIGHT: 229 LBS | DIASTOLIC BLOOD PRESSURE: 94 MMHG | SYSTOLIC BLOOD PRESSURE: 167 MMHG | HEART RATE: 88 BPM

## 2019-11-22 DIAGNOSIS — D64.9 SYMPTOMATIC ANEMIA: Primary | ICD-10-CM

## 2019-11-22 DIAGNOSIS — R71.8 LOW MEAN CORPUSCULAR VOLUME (MCV): ICD-10-CM

## 2019-11-22 DIAGNOSIS — D50.9 IRON DEFICIENCY ANEMIA, UNSPECIFIED IRON DEFICIENCY ANEMIA TYPE: ICD-10-CM

## 2019-11-22 LAB
ABO GROUP BLD: NORMAL
ALBUMIN SERPL BCP-MCNC: 3.4 G/DL (ref 3.5–5)
ALP SERPL-CCNC: 118 U/L (ref 46–116)
ALT SERPL W P-5'-P-CCNC: 11 U/L (ref 12–78)
ANION GAP SERPL CALCULATED.3IONS-SCNC: 6 MMOL/L (ref 4–13)
APTT PPP: 28 SECONDS (ref 23–37)
AST SERPL W P-5'-P-CCNC: 14 U/L (ref 5–45)
BASOPHILS # BLD AUTO: 0.11 THOUSANDS/ΜL (ref 0–0.1)
BASOPHILS NFR BLD AUTO: 1 % (ref 0–1)
BILIRUB SERPL-MCNC: 0.24 MG/DL (ref 0.2–1)
BLD GP AB SCN SERPL QL: NEGATIVE
BUN SERPL-MCNC: 12 MG/DL (ref 5–25)
CALCIUM SERPL-MCNC: 9 MG/DL (ref 8.3–10.1)
CHLORIDE SERPL-SCNC: 108 MMOL/L (ref 100–108)
CO2 SERPL-SCNC: 26 MMOL/L (ref 21–32)
CREAT SERPL-MCNC: 0.76 MG/DL (ref 0.6–1.3)
EOSINOPHIL # BLD AUTO: 0.31 THOUSAND/ΜL (ref 0–0.61)
EOSINOPHIL NFR BLD AUTO: 4 % (ref 0–6)
ERYTHROCYTE [DISTWIDTH] IN BLOOD BY AUTOMATED COUNT: 18.3 % (ref 11.6–15.1)
FERRITIN SERPL-MCNC: 2 NG/ML (ref 8–388)
GFR SERPL CREATININE-BSD FRML MDRD: 91 ML/MIN/1.73SQ M
GLUCOSE SERPL-MCNC: 173 MG/DL (ref 65–140)
HCT VFR BLD AUTO: 25.6 % (ref 34.8–46.1)
HGB BLD-MCNC: 6.2 G/DL (ref 11.5–15.4)
IMM GRANULOCYTES # BLD AUTO: 0.04 THOUSAND/UL (ref 0–0.2)
IMM GRANULOCYTES NFR BLD AUTO: 1 % (ref 0–2)
INR PPP: 0.96 (ref 0.84–1.19)
IRON SATN MFR SERPL: 3 %
IRON SERPL-MCNC: 14 UG/DL (ref 50–170)
LYMPHOCYTES # BLD AUTO: 1.62 THOUSANDS/ΜL (ref 0.6–4.47)
LYMPHOCYTES NFR BLD AUTO: 21 % (ref 14–44)
MCH RBC QN AUTO: 16.4 PG (ref 26.8–34.3)
MCHC RBC AUTO-ENTMCNC: 24.2 G/DL (ref 31.4–37.4)
MCV RBC AUTO: 68 FL (ref 82–98)
MONOCYTES # BLD AUTO: 0.56 THOUSAND/ΜL (ref 0.17–1.22)
MONOCYTES NFR BLD AUTO: 7 % (ref 4–12)
NEUTROPHILS # BLD AUTO: 5.09 THOUSANDS/ΜL (ref 1.85–7.62)
NEUTS SEG NFR BLD AUTO: 66 % (ref 43–75)
NRBC BLD AUTO-RTO: 0 /100 WBCS
PLATELET # BLD AUTO: 257 THOUSANDS/UL (ref 149–390)
PMV BLD AUTO: 9.1 FL (ref 8.9–12.7)
POTASSIUM SERPL-SCNC: 4.1 MMOL/L (ref 3.5–5.3)
PROT SERPL-MCNC: 7.1 G/DL (ref 6.4–8.2)
PROTHROMBIN TIME: 12.4 SECONDS (ref 11.6–14.5)
RBC # BLD AUTO: 3.78 MILLION/UL (ref 3.81–5.12)
RH BLD: NEGATIVE
SODIUM SERPL-SCNC: 140 MMOL/L (ref 136–145)
SPECIMEN EXPIRATION DATE: NORMAL
TIBC SERPL-MCNC: 483 UG/DL (ref 250–450)
TROPONIN I SERPL-MCNC: <0.02 NG/ML
WBC # BLD AUTO: 7.73 THOUSAND/UL (ref 4.31–10.16)

## 2019-11-22 PROCEDURE — 93005 ELECTROCARDIOGRAM TRACING: CPT

## 2019-11-22 PROCEDURE — P9016 RBC LEUKOCYTES REDUCED: HCPCS

## 2019-11-22 PROCEDURE — 36415 COLL VENOUS BLD VENIPUNCTURE: CPT | Performed by: EMERGENCY MEDICINE

## 2019-11-22 PROCEDURE — 86900 BLOOD TYPING SEROLOGIC ABO: CPT | Performed by: EMERGENCY MEDICINE

## 2019-11-22 PROCEDURE — 85730 THROMBOPLASTIN TIME PARTIAL: CPT | Performed by: EMERGENCY MEDICINE

## 2019-11-22 PROCEDURE — 82728 ASSAY OF FERRITIN: CPT | Performed by: EMERGENCY MEDICINE

## 2019-11-22 PROCEDURE — 84484 ASSAY OF TROPONIN QUANT: CPT | Performed by: EMERGENCY MEDICINE

## 2019-11-22 PROCEDURE — 85025 COMPLETE CBC W/AUTO DIFF WBC: CPT | Performed by: EMERGENCY MEDICINE

## 2019-11-22 PROCEDURE — 80053 COMPREHEN METABOLIC PANEL: CPT | Performed by: EMERGENCY MEDICINE

## 2019-11-22 PROCEDURE — 83540 ASSAY OF IRON: CPT | Performed by: EMERGENCY MEDICINE

## 2019-11-22 PROCEDURE — 86920 COMPATIBILITY TEST SPIN: CPT

## 2019-11-22 PROCEDURE — 86850 RBC ANTIBODY SCREEN: CPT | Performed by: EMERGENCY MEDICINE

## 2019-11-22 PROCEDURE — 83550 IRON BINDING TEST: CPT | Performed by: EMERGENCY MEDICINE

## 2019-11-22 PROCEDURE — 36430 TRANSFUSION BLD/BLD COMPNT: CPT

## 2019-11-22 PROCEDURE — 85610 PROTHROMBIN TIME: CPT | Performed by: EMERGENCY MEDICINE

## 2019-11-22 PROCEDURE — 86901 BLOOD TYPING SEROLOGIC RH(D): CPT | Performed by: EMERGENCY MEDICINE

## 2019-11-22 PROCEDURE — 99284 EMERGENCY DEPT VISIT MOD MDM: CPT | Performed by: EMERGENCY MEDICINE

## 2019-11-22 PROCEDURE — 99284 EMERGENCY DEPT VISIT MOD MDM: CPT

## 2019-11-22 RX ORDER — FERROUS SULFATE 325(65) MG
325 TABLET ORAL
Qty: 30 TABLET | Refills: 0 | Status: SHIPPED | OUTPATIENT
Start: 2019-11-22 | End: 2020-02-20

## 2019-11-22 NOTE — ED ATTENDING ATTESTATION
11/22/2019  ITeresa MD, saw and evaluated the patient  I have discussed the patient with the resident/non-physician practitioner and agree with the resident's/non-physician practitioner's findings, Plan of Care, and MDM as documented in the resident's/non-physician practitioner's note, except where noted  All available labs and Radiology studies were reviewed  I was present for key portions of any procedure(s) performed by the resident/non-physician practitioner and I was immediately available to provide assistance  At this point I agree with the current assessment done in the Emergency Department  I have conducted an independent evaluation of this patient a history and physical is as follows:    ED Course         Critical Care Time  Procedures    ILuigi MD, saw and evaluated the patient  I have discussed the patient with the resident/non-physician practitioner and agree with the resident's/non-physician practitioner's findings, Plan of Care, and MDM as documented in the resident's/non-physician practitioner's note, except where noted  All available labs and Radiology studies were reviewed  I was present for key portions of any procedure(s) performed by the resident/non-physician practitioner and I was immediately available to provide assistance  At this point I agree with the current assessment done in the Emergency Department  I have conducted an independent evaluation of this patient including a focused history and a physical exam         26-year-old female, referred to the emergency department for evaluation of anemia  Patient had outpatient lab work performed with her primary care physician 2 days ago that demonstrated anemia with a hemoglobin of 6  Patient reports that she has been increasingly fatigued over the past 3-6 months  Her daughter who accompanies her to this emergency department visit has not noticed that she has been pale    The patient has remote history of gastric bypass  No history of black or bloody stools  Ten systems reviewed negative except as noted in the history of present illness  The patient is resting comfortably on a stretcher in no acute respiratory distress  The patient appears nontoxic  HEENT reveals moist mucous membranes  Head is normocephalic and atraumatic  Conjunctiva and sclera are normal  Neck is nontender and supple with full range of motion to flexion, extension, lateral rotation  No meningismus appreciated  No masses are appreciated  Lungs are clear to auscultation bilaterally without any wheezes, rales or rhonchi  Heart is regular rate and rhythm without any murmurs, rubs or gallops  Abdomen is soft and nontender without any rebound or guarding  Extremities appear grossly normal without any significant arthropathy  Patient is awake, alert, and oriented x3  The patient has normal interaction  Motor is 5 out of 5  Patient appears pale  Assessment and plan:  57-year-old female presenting to the emergency department for evaluation of anemia  Unclear source  Recommended transfusion and admission for further evaluation, however patient was unwilling to be admitted to the hospital   In lieu of admission, the patient will need to follow up with Hematology and Gastroenterology as an outpatient  Labs Reviewed   CBC AND DIFFERENTIAL - Abnormal       Result Value Ref Range Status    WBC 7 73  4 31 - 10 16 Thousand/uL Final    RBC 3 78 (*) 3 81 - 5 12 Million/uL Final    Hemoglobin 6 2 (*) 11 5 - 15 4 g/dL Final    Comment: This result has been called to [facility] by Mohammed Lesch on 11 22 2019 at , and has been read back       Hematocrit 25 6 (*) 34 8 - 46 1 % Final    MCV 68 (*) 82 - 98 fL Final    MCH 16 4 (*) 26 8 - 34 3 pg Final    MCHC 24 2 (*) 31 4 - 37 4 g/dL Final    RDW 18 3 (*) 11 6 - 15 1 % Final    MPV 9 1  8 9 - 12 7 fL Final    Platelets 167  204 - 390 Thousands/uL Final    nRBC 0  /100 WBCs Final Neutrophils Relative 66  43 - 75 % Final    Immat GRANS % 1  0 - 2 % Final    Lymphocytes Relative 21  14 - 44 % Final    Monocytes Relative 7  4 - 12 % Final    Eosinophils Relative 4  0 - 6 % Final    Basophils Relative 1  0 - 1 % Final    Neutrophils Absolute 5 09  1 85 - 7 62 Thousands/µL Final    Immature Grans Absolute 0 04  0 00 - 0 20 Thousand/uL Final    Lymphocytes Absolute 1 62  0 60 - 4 47 Thousands/µL Final    Monocytes Absolute 0 56  0 17 - 1 22 Thousand/µL Final    Eosinophils Absolute 0 31  0 00 - 0 61 Thousand/µL Final    Basophils Absolute 0 11 (*) 0 00 - 0 10 Thousands/µL Final   COMPREHENSIVE METABOLIC PANEL - Abnormal    Sodium 140  136 - 145 mmol/L Final    Potassium 4 1  3 5 - 5 3 mmol/L Final    Chloride 108  100 - 108 mmol/L Final    CO2 26  21 - 32 mmol/L Final    ANION GAP 6  4 - 13 mmol/L Final    BUN 12  5 - 25 mg/dL Final    Creatinine 0 76  0 60 - 1 30 mg/dL Final    Comment: Standardized to IDMS reference method    Glucose 173 (*) 65 - 140 mg/dL Final    Comment:   If the patient is fasting, the ADA then defines impaired fasting glucose as > 100 mg/dL and diabetes as > or equal to 123 mg/dL  Specimen collection should occur prior to Sulfasalazine administration due to the potential for falsely depressed results  Specimen collection should occur prior to Sulfapyridine administration due to the potential for falsely elevated results  Calcium 9 0  8 3 - 10 1 mg/dL Final    AST 14  5 - 45 U/L Final    Comment:   Specimen collection should occur prior to Sulfasalazine administration due to the potential for falsely depressed results  ALT 11 (*) 12 - 78 U/L Final    Comment:   Specimen collection should occur prior to Sulfasalazine and/or Sulfapyridine administration due to the potential for falsely depressed results       Alkaline Phosphatase 118 (*) 46 - 116 U/L Final    Total Protein 7 1  6 4 - 8 2 g/dL Final    Albumin 3 4 (*) 3 5 - 5 0 g/dL Final    Total Bilirubin 0 24  0 20 - 1 00 mg/dL Final    eGFR 91  ml/min/1 73sq m Final    Narrative:     [place] guidelines for Chronic Kidney Disease (CKD):     Stage 1 with normal or high GFR (GFR > 90 mL/min/1 73 square meters)    Stage 2 Mild CKD (GFR = 60-89 mL/min/1 73 square meters)    Stage 3A Moderate CKD (GFR = 45-59 mL/min/1 73 square meters)    Stage 3B Moderate CKD (GFR = 30-44 mL/min/1 73 square meters)    Stage 4 Severe CKD (GFR = 15-29 mL/min/1 73 square meters)    Stage 5 End Stage CKD (GFR <15 mL/min/1 73 square meters)  Note: GFR calculation is accurate only with a steady state creatinine   IRON SATURATION - Abnormal    Iron Saturation 3  % Final    TIBC 483 (*) 250 - 450 ug/dL Final    Iron 14 (*) 50 - 170 ug/dL Final    Comment:   Patients treated with metal-binding drugs (ie  Deferoxamine) may have depressed iron values  FERRITIN - Abnormal    Ferritin 2 (*) 8 - 388 ng/mL Final   TROPONIN I - Normal    Troponin I <0 02  <=0 04 ng/mL Final    Comment:   [employer] Chemistry analyzer 99% cutoff is > 0 04 ng/mL in network labs     o cTnI 99% cutoff is useful only when applied to patients in the clinical setting of myocardial ischemia   o cTnI 99% cutoff should be interpreted in the context of clinical history, ECG findings and possibly cardiac imaging to establish correct diagnosis  o cTnI 99% cutoff may be suggestive but clearly not indicative of a coronary event without the clinical setting of myocardial ischemia       PROTIME-INR - Normal    Protime 12 4  11 6 - 14 5 seconds Final    INR 0 96  0 84 - 1 19 Final   APTT - Normal    PTT 28  23 - 37 seconds Final    Comment: Therapeutic Heparin Range =  60-90 seconds   TYPE AND SCREEN    ABO Grouping A   Final    Rh Factor Negative   Final    Antibody Screen Negative   Final    Specimen Expiration Date [id number]   Final   PREPARE [facility] ADULT    Unit Product Code [id number]       Unit Number [id number]       Unit ABO A       Unit RH NEG Crossmatch Compatible   Final    Unit Dispense Status Issued      IRON PANEL (INCLUDES FERRITIN,IRON SAT%, IRON, AND TIBC)    Narrative: The following orders were created for panel order Iron Panel (Includes Ferritin, Iron Sat%, Iron, and TIBC)  Procedure                               Abnormality         Status                     ---------                               -----------         ------                     Iron Saturation %[[id number]]            Abnormal            Final result               Ferritin[[id number]]                     Abnormal            Final result                 Please view results for these tests on the individual orders  No orders to display     Patient will require transfusion of 1 unit of packed blood cells to get hemoglobin greater than 7

## 2019-11-23 LAB
ABO GROUP BLD BPU: NORMAL
ATRIAL RATE: 104 BPM
BPU ID: NORMAL
CROSSMATCH: NORMAL
P AXIS: 59 DEGREES
PR INTERVAL: 134 MS
QRS AXIS: 0 DEGREES
QRSD INTERVAL: 68 MS
QT INTERVAL: 352 MS
QTC INTERVAL: 462 MS
T WAVE AXIS: 45 DEGREES
UNIT DISPENSE STATUS: NORMAL
UNIT PRODUCT CODE: NORMAL
UNIT RH: NORMAL
VENTRICULAR RATE: 104 BPM

## 2019-11-23 PROCEDURE — 93010 ELECTROCARDIOGRAM REPORT: CPT | Performed by: INTERNAL MEDICINE

## 2019-11-25 ENCOUNTER — TELEPHONE (OUTPATIENT)
Dept: SURGICAL ONCOLOGY | Facility: CLINIC | Age: 49
End: 2019-11-25

## 2019-11-25 NOTE — TELEPHONE ENCOUNTER
New Patient Encounter    New Patient Intake Form   Patient Details:  Elo Cheema  1970  [id number]    Background Information:  [address] starts by opening a telephone encounter and gathering the following information   Who is calling to schedule? If not self, relationship to patient? self   Referring Provider ER   What is the diagnosis? Anemia 11/2019   When was the diagnosis? 11/2019   Is patient aware of diagnosis? Yes   Reason for visit? NP DX   Have you had any testing done? If so: when, where? Yes   Are records in [place]? yes   Was the patient told to bring a disk? no   Scheduling Information:   Preferred [place]:  [facility]     Requesting Specific Provider? no   Are there any dates/time the patient cannot be seen? no      Miscellaneous: n/a   After completing the above information, please route to Financial Counselor and the appropriate Nurse Navigator for review

## 2019-12-10 ENCOUNTER — OFFICE VISIT (OUTPATIENT)
Dept: OBGYN CLINIC | Facility: CLINIC | Age: 49
End: 2019-12-10

## 2019-12-10 VITALS
HEIGHT: 63 IN | SYSTOLIC BLOOD PRESSURE: 134 MMHG | DIASTOLIC BLOOD PRESSURE: 87 MMHG | BODY MASS INDEX: 40.5 KG/M2 | WEIGHT: 228.6 LBS

## 2019-12-10 DIAGNOSIS — Z01.419 WOMEN'S ANNUAL ROUTINE GYNECOLOGICAL EXAMINATION: Primary | ICD-10-CM

## 2019-12-10 DIAGNOSIS — Z00.00 ENCOUNTER FOR MEDICAL EXAMINATION TO ESTABLISH CARE: ICD-10-CM

## 2019-12-10 PROCEDURE — 87624 HPV HI-RISK TYP POOLED RSLT: CPT

## 2019-12-10 PROCEDURE — 99386 PREV VISIT NEW AGE 40-64: CPT | Performed by: OBSTETRICS & GYNECOLOGY

## 2019-12-10 PROCEDURE — 3725F SCREEN DEPRESSION PERFORMED: CPT | Performed by: OBSTETRICS & GYNECOLOGY

## 2019-12-10 PROCEDURE — G0145 SCR C/V CYTO,THINLAYER,RESCR: HCPCS | Performed by: OBSTETRICS & GYNECOLOGY
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
## 2019-12-11 LAB
HPV HR 12 DNA CVX QL NAA+PROBE: NEGATIVE
HPV16 DNA CVX QL NAA+PROBE: NEGATIVE
HPV18 DNA CVX QL NAA+PROBE: NEGATIVE

## 2019-12-13 PROCEDURE — 87107 FUNGI IDENTIFICATION MOLD: CPT | Performed by: PODIATRIST

## 2019-12-13 PROCEDURE — 87102 FUNGUS ISOLATION CULTURE: CPT | Performed by: PODIATRIST

## 2019-12-13 PROCEDURE — 87106 FUNGI IDENTIFICATION YEAST: CPT | Performed by: PODIATRIST

## 2019-12-13 PROCEDURE — 87220 TISSUE EXAM FOR FUNGI: CPT | Performed by: PODIATRIST

## 2019-12-16 LAB
LAB AP GYN PRIMARY INTERPRETATION: NORMAL
Lab: NORMAL

## 2019-12-17 ENCOUNTER — LAB REQUISITION (OUTPATIENT)
Dept: LAB | Facility: HOSPITAL | Age: 49
End: 2019-12-17
Payer: COMMERCIAL

## 2019-12-17 DIAGNOSIS — B35.1 TINEA UNGUIUM: ICD-10-CM

## 2019-12-18 ENCOUNTER — CONSULT (OUTPATIENT)
Dept: HEMATOLOGY ONCOLOGY | Facility: HOSPITAL | Age: 49
End: 2019-12-18
Payer: COMMERCIAL

## 2019-12-18 ENCOUNTER — APPOINTMENT (OUTPATIENT)
Dept: LAB | Facility: HOSPITAL | Age: 49
End: 2019-12-18
Payer: COMMERCIAL

## 2019-12-18 VITALS
BODY MASS INDEX: 40.57 KG/M2 | WEIGHT: 229 LBS | HEIGHT: 63 IN | OXYGEN SATURATION: 97 % | DIASTOLIC BLOOD PRESSURE: 88 MMHG | HEART RATE: 125 BPM | RESPIRATION RATE: 16 BRPM | SYSTOLIC BLOOD PRESSURE: 118 MMHG | TEMPERATURE: 98.8 F

## 2019-12-18 DIAGNOSIS — D50.9 IRON DEFICIENCY ANEMIA, UNSPECIFIED IRON DEFICIENCY ANEMIA TYPE: Primary | ICD-10-CM

## 2019-12-18 DIAGNOSIS — D50.9 IRON DEFICIENCY ANEMIA, UNSPECIFIED IRON DEFICIENCY ANEMIA TYPE: ICD-10-CM

## 2019-12-18 LAB
ALBUMIN SERPL BCP-MCNC: 3.7 G/DL (ref 3.5–5)
ALP SERPL-CCNC: 119 U/L (ref 46–116)
ALT SERPL W P-5'-P-CCNC: 21 U/L (ref 12–78)
ANION GAP SERPL CALCULATED.3IONS-SCNC: 4 MMOL/L (ref 4–13)
AST SERPL W P-5'-P-CCNC: 20 U/L (ref 5–45)
BASOPHILS # BLD AUTO: 0.11 THOUSANDS/ΜL (ref 0–0.1)
BASOPHILS NFR BLD AUTO: 1 % (ref 0–1)
BILIRUB SERPL-MCNC: 0.25 MG/DL (ref 0.2–1)
BUN SERPL-MCNC: 15 MG/DL (ref 5–25)
CALCIUM SERPL-MCNC: 9.1 MG/DL (ref 8.3–10.1)
CHLORIDE SERPL-SCNC: 105 MMOL/L (ref 100–108)
CO2 SERPL-SCNC: 30 MMOL/L (ref 21–32)
CREAT SERPL-MCNC: 0.77 MG/DL (ref 0.6–1.3)
EOSINOPHIL # BLD AUTO: 0.26 THOUSAND/ΜL (ref 0–0.61)
EOSINOPHIL NFR BLD AUTO: 3 % (ref 0–6)
ERYTHROCYTE [DISTWIDTH] IN BLOOD BY AUTOMATED COUNT: 24.5 % (ref 11.6–15.1)
GFR SERPL CREATININE-BSD FRML MDRD: 90 ML/MIN/1.73SQ M
GLUCOSE SERPL-MCNC: 86 MG/DL (ref 65–140)
HCT VFR BLD AUTO: 35.3 % (ref 34.8–46.1)
HGB BLD-MCNC: 9.1 G/DL (ref 11.5–15.4)
IMM GRANULOCYTES # BLD AUTO: 0.04 THOUSAND/UL (ref 0–0.2)
IMM GRANULOCYTES NFR BLD AUTO: 0 % (ref 0–2)
LYMPHOCYTES # BLD AUTO: 1.77 THOUSANDS/ΜL (ref 0.6–4.47)
LYMPHOCYTES NFR BLD AUTO: 20 % (ref 14–44)
MCH RBC QN AUTO: 19.1 PG (ref 26.8–34.3)
MCHC RBC AUTO-ENTMCNC: 25.8 G/DL (ref 31.4–37.4)
MCV RBC AUTO: 74 FL (ref 82–98)
MONOCYTES # BLD AUTO: 0.82 THOUSAND/ΜL (ref 0.17–1.22)
MONOCYTES NFR BLD AUTO: 9 % (ref 4–12)
NEUTROPHILS # BLD AUTO: 5.98 THOUSANDS/ΜL (ref 1.85–7.62)
NEUTS SEG NFR BLD AUTO: 67 % (ref 43–75)
NRBC BLD AUTO-RTO: 0 /100 WBCS
PLATELET # BLD AUTO: 437 THOUSANDS/UL (ref 149–390)
PMV BLD AUTO: 9.3 FL (ref 8.9–12.7)
POTASSIUM SERPL-SCNC: 3.8 MMOL/L (ref 3.5–5.3)
PROT SERPL-MCNC: 7.8 G/DL (ref 6.4–8.2)
RBC # BLD AUTO: 4.77 MILLION/UL (ref 3.81–5.12)
SODIUM SERPL-SCNC: 139 MMOL/L (ref 136–145)
WBC # BLD AUTO: 8.98 THOUSAND/UL (ref 4.31–10.16)

## 2019-12-18 PROCEDURE — 85025 COMPLETE CBC W/AUTO DIFF WBC: CPT

## 2019-12-18 PROCEDURE — 99214 OFFICE O/P EST MOD 30 MIN: CPT | Performed by: NURSE PRACTITIONER

## 2019-12-18 PROCEDURE — 36415 COLL VENOUS BLD VENIPUNCTURE: CPT

## 2019-12-18 PROCEDURE — 80053 COMPREHEN METABOLIC PANEL: CPT

## 2019-12-18 RX ORDER — SODIUM CHLORIDE 9 MG/ML
20 INJECTION, SOLUTION INTRAVENOUS ONCE
Status: CANCELLED | OUTPATIENT
Start: 2020-01-07

## 2019-12-18 NOTE — PROGRESS NOTES
Oncology Outpatient Consult Note  Beryle Manas 44 y o  female MRN: @ Encounter: [id number]        Date:  12/18/2019        CC:  Iron deficiency anemia      HPI:  Beryle Manas is seen for initial consultation 12/18/2019 regarding iron deficiency anemia  She was seen in the emergency room on 11/22/2019 with a hemoglobin of 6 2, MCV 68  She did have iron studies that reflected iron saturation 3%  She did not have any repeat blood work after she was seen in the emergency room  WBC and platelet counts are normal   Previous blood work from February 2017 reflects a normal hemoglobin running in the 14 range  I do not have blood work in between 2017 and 2019  She does report a past medical history significant for gastric bypass in 2003 and also reports a partial hysterectomy in 2006  She has not had any vaginal bleeding since the 2006  She does report fatigue  Otherwise denies chest pain, shortness of breath, nausea, vomiting, diarrhea, bleeding/bruising, and fevers/infection  She does report intermittent dark stools  She is also on oral iron  Previous Hematologic/ Oncologic History:    Workup    Test Results:    Imaging: No results found      Labs:   Lab Results   Component Value Date    WBC 7 73 11/22/2019    HGB 6 2 (LL) 11/22/2019    HCT 25 6 (L) 11/22/2019    MCV 68 (L) 11/22/2019     11/22/2019     Lab Results   Component Value Date     08/25/2014    K 4 1 11/22/2019     11/22/2019    CO2 26 11/22/2019    ANIONGAP 6 08/25/2014    BUN 12 11/22/2019    CREATININE 0 76 11/22/2019    GLUCOSE 96 08/25/2014    GLUF 85 11/20/2019    CALCIUM 9 0 11/22/2019    AST 14 11/22/2019    ALT 11 (L) 11/22/2019    ALKPHOS 118 (H) 11/22/2019    PROT 7 4 08/25/2014    BILITOT 0 42 08/25/2014    EGFR 91 11/22/2019         No results found for: SPEP, UPEP    No results found for: PSA    No results found for: CEA    No results found for:     No results found for: AFP    Lab Results   Component Value Date    IRON 14 (L) 2019    TIBC 483 (H) 2019    FERRITIN 2 (L) 2019       No results found for: [id number]        ROS: As stated in history of present illness otherwise her 14 point review of systems today was negative  Active Problems:   Patient Active Problem List   Diagnosis    Encounter for medical examination to establish care    Onychomycosis    Obesity (BMI 35 0-39 9 without comorbidity)    Encounter for screening colonoscopy    Women's annual routine gynecological examination       Past Medical History:   Past Medical History:   Diagnosis Date    Anxiety     Asthma     Hypertension     Migraine     Palpitations     Psychiatric disorder        Surgical History:   Past Surgical History:   Procedure Laterality Date     SECTION      CHOLECYSTECTOMY      FEMUR FRACTURE SURGERY      FRACTURE SURGERY      Treatment of fractures of both radial and ulnar shafts     FRACTURE SURGERY      Treatment pf tibial shaft fracture with plate/screws    GASTRIC BYPASS      HERNIA REPAIR      HYSTERECTOMY      SHOULDER SURGERY Right        Family History:    Family History   Problem Relation Age of Onset    Diabetes Father     Hypertension Father     Diabetes Maternal Grandmother     Hypertension Maternal Grandmother     Breast cancer Maternal Aunt     Ovarian cancer Maternal Aunt     Thyroid disease Mother     Hernia Mother        Cancer-related family history includes Breast cancer in her maternal aunt; Ovarian cancer in her maternal aunt      Social History:   Social History     Socioeconomic History    Marital status: /Civil Union     Spouse name: Not on file    Number of children: Not on file    Years of education: Not on file    Highest education level: Not on file   Occupational History    Not on file   Social Needs    Financial resource strain: Not on file    Food insecurity:     Worry: Not on file     Inability: Not on file   [facility] Transportation needs:     Medical: Not on file     Non-medical: Not on file   Tobacco Use    Smoking status: Current Every Day Smoker     Packs/day: 0 50    Smokeless tobacco: Never Used   Substance and Sexual Activity    Alcohol use: No    Drug use: No    Sexual activity: Yes     Partners: Male   Lifestyle    Physical activity:     Days per week: Not on file     Minutes per session: Not on file    Stress: Not on file   Relationships    Social connections:     Talks on phone: Not on file     Gets together: Not on file     Attends Rastafarian service: Not on file     Active member of club or organization: Not on file     Attends meetings of clubs or organizations: Not on file     Relationship status: Not on file    Intimate partner violence:     Fear of current or ex partner: Not on file     Emotionally abused: Not on file     Physically abused: Not on file     Forced sexual activity: Not on file   Other Topics Concern    Not on file   Social History Narrative    ** Merged History Encounter **         Never drank alcohol - As per [employer]        Current Medications:   Current Outpatient Medications   Medication Sig Dispense Refill    ferrous sulfate 325 (65 Fe) mg tablet Take 1 tablet (325 mg total) by mouth daily with breakfast 30 tablet 0    LORazepam (ATIVAN) 0 5 mg tablet Take 0 5 mg by mouth daily as needed  2    QUEtiapine (SEROquel) 100 mg tablet Take 100 mg by mouth daily at bedtime      venlafaxine (EFFEXOR) 50 mg tablet Take 225 mg by mouth daily        No current facility-administered medications for this visit  Allergies: Allergies   Allergen Reactions    Codeine     Latex     Morphine     Morphine And Related     Sulfa Antibiotics     Tylenol [Acetaminophen]          Physical Exam:    There is no height or weight on file to calculate BSA      Wt Readings from Last 3 Encounters:   12/10/19 104 kg (228 lb 9 6 oz)   11/22/19 104 kg (229 lb)   11/20/19 104 kg (229 lb)        Temp Readings from Last 3 Encounters:   11/22/19 98 5 °F (36 9 °C)   11/20/19 97 5 °F (36 4 °C) (Temporal)   06/07/17 (!) 97 °F (36 1 °C) (Tympanic)        BP Readings from Last 3 Encounters:   12/10/19 134/87   11/22/19 167/94   11/20/19 128/74         Pulse Readings from Last 3 Encounters:   11/22/19 88   11/20/19 (!) 115   06/07/17 88     @LASTSAO2(3)@    Physical Exam     Constitutional   General appearance:  Pale  No acute distress, well appearing and well nourished  Eyes   Conjunctiva and lids: No swelling, erythema or discharge  Pupils and irises: Equal, round and reactive to light  Ears, Nose, Mouth, and Throat   External inspection of ears and nose: Normal     Nasal mucosa, septum, and turbinates: Normal without edema or erythema  Oropharynx: Normal with no erythema, edema, exudate or lesions  Pulmonary   Respiratory effort: No increased work of breathing or signs of respiratory distress  Auscultation of lungs: Clear to auscultation  Cardiovascular   Palpation of heart: Normal PMI, no thrills  Auscultation of heart: Normal rate and rhythm, normal S1 and S2, without murmurs  Examination of extremities for edema and/or varicosities: Normal     Carotid pulses: Normal     Abdomen   Abdomen: Non-tender, no masses  Liver and spleen: No hepatomegaly or splenomegaly  Lymphatic   Palpation of lymph nodes in neck: No lymphadenopathy  Musculoskeletal   Gait and station: Normal     Digits and nails: Normal without clubbing or cyanosis  Inspection/palpation of joints, bones, and muscles: Normal     Skin   Skin and subcutaneous tissue: Normal without rashes or lesions  Neurologic   Cranial nerves: Cranial nerves 2-12 intact  Sensation: No sensory loss  Psychiatric   Orientation to person, place, and time: Normal     Mood and affect: Normal           Assessment/ Plan:    The patient is a 52yo female who was referred to us with iron deficiency anemia    This was discovered when she was in the emergency room on 11/22/2019  She has hemoglobin of 6 2 with MCV of 68  She received 1 unit of blood and was discharged  There was no repeat blood work after this  We will repeat her blood work today  If her hemoglobin is still less than 7 5 she will need 1 unit of blood  Otherwise we will set her up for Venofer 200 mg IV weekly times 10  She plans to follow with Gastroenterology in January  She is also following with Gynecology  We will see her back in 3-4 months with repeat blood work including CBC, CMP, iron studies, ferritin, and MMA  She is aware to have the blood work completed approximately 1 week prior to her appointment  In the meantime if she has any questions or concerns she will call the office  Goals and Barriers:  Current Goal:  Prolong Survival from iron deficiency anemia  Barriers: None  Patient's Capacity to Self Care:  Patient able to self care  Portions of the record may have been created with voice recognition software   Occasional wrong word or "sound a like" substitutions may have occurred due to the inherent limitations of voice recognition software   Read the chart carefully and recognize, using context, where substitutions have occurred      Emergency Contacts:    Anthony Swain, [phone],   *No Contact Specified*, ,     Code Status: [unfilled]  Advance Directive and Living Will:      Power of :    POLST:

## 2019-12-19 LAB
KOH PREP SPEC: ABNORMAL
KOH PREP SPEC: NORMAL

## 2019-12-23 ENCOUNTER — HOSPITAL ENCOUNTER (OUTPATIENT)
Dept: ULTRASOUND IMAGING | Facility: HOSPITAL | Age: 49
Discharge: HOME | End: 2019-12-23
Payer: COMMERCIAL

## 2019-12-23 DIAGNOSIS — Z01.419 WOMEN'S ANNUAL ROUTINE GYNECOLOGICAL EXAMINATION: ICD-10-CM

## 2019-12-23 PROCEDURE — 76856 US EXAM PELVIC COMPLETE: CPT

## 2019-12-23 PROCEDURE — 76830 TRANSVAGINAL US NON-OB: CPT

## 2019-12-26 LAB — FUNGUS SPEC CULT: ABNORMAL

## 2019-12-31 ENCOUNTER — TELEPHONE (OUTPATIENT)
Dept: HEMATOLOGY ONCOLOGY | Facility: CLINIC | Age: 49
End: 2019-12-31

## 2020-01-02 LAB — FUNGUS SPEC CULT: ABNORMAL

## 2020-01-06 ENCOUNTER — HOSPITAL ENCOUNTER (OUTPATIENT)
Dept: INFUSION CENTER | Facility: HOSPITAL | Age: 50
End: 2020-01-06
Attending: INTERNAL MEDICINE

## 2020-01-07 ENCOUNTER — HOSPITAL ENCOUNTER (OUTPATIENT)
Dept: INFUSION CENTER | Facility: HOSPITAL | Age: 50
Discharge: HOME | End: 2020-01-07
Attending: INTERNAL MEDICINE
Payer: COMMERCIAL

## 2020-01-07 VITALS
HEART RATE: 97 BPM | OXYGEN SATURATION: 98 % | RESPIRATION RATE: 18 BRPM | SYSTOLIC BLOOD PRESSURE: 128 MMHG | TEMPERATURE: 98.4 F | DIASTOLIC BLOOD PRESSURE: 80 MMHG

## 2020-01-07 DIAGNOSIS — D50.9 IRON DEFICIENCY ANEMIA, UNSPECIFIED IRON DEFICIENCY ANEMIA TYPE: Primary | ICD-10-CM

## 2020-01-07 PROCEDURE — 96365 THER/PROPH/DIAG IV INF INIT: CPT

## 2020-01-07 RX ORDER — SODIUM CHLORIDE 9 MG/ML
20 INJECTION, SOLUTION INTRAVENOUS ONCE
Status: COMPLETED | OUTPATIENT
Start: 2020-01-07 | End: 2020-01-07

## 2020-01-07 RX ORDER — SODIUM CHLORIDE 9 MG/ML
20 INJECTION, SOLUTION INTRAVENOUS ONCE
Status: CANCELLED | OUTPATIENT
Start: 2020-01-14

## 2020-01-07 RX ADMIN — IRON SUCROSE 200 MG: 20 INJECTION, SOLUTION INTRAVENOUS at 09:21

## 2020-01-07 RX ADMIN — SODIUM CHLORIDE 20 ML/HR: 9 INJECTION, SOLUTION INTRAVENOUS at 09:22

## 2020-01-07 NOTE — PROGRESS NOTES
Venofer infusion completed, pt states she has her next appointment, refused AVS  Patient left unit ambulatory with steady gait

## 2020-01-13 ENCOUNTER — HOSPITAL ENCOUNTER (OUTPATIENT)
Dept: INFUSION CENTER | Facility: HOSPITAL | Age: 50
Discharge: HOME | End: 2020-01-13
Attending: INTERNAL MEDICINE
Payer: COMMERCIAL

## 2020-01-13 ENCOUNTER — TELEPHONE (OUTPATIENT)
Dept: FAMILY MEDICINE CLINIC | Facility: CLINIC | Age: 50
End: 2020-01-13

## 2020-01-13 VITALS
OXYGEN SATURATION: 97 % | RESPIRATION RATE: 16 BRPM | SYSTOLIC BLOOD PRESSURE: 146 MMHG | TEMPERATURE: 97.7 F | HEART RATE: 97 BPM | DIASTOLIC BLOOD PRESSURE: 91 MMHG

## 2020-01-13 DIAGNOSIS — D50.9 IRON DEFICIENCY ANEMIA, UNSPECIFIED IRON DEFICIENCY ANEMIA TYPE: Primary | ICD-10-CM

## 2020-01-13 DIAGNOSIS — Z98.84 STATUS FOLLOWING GASTRIC BYPASS FOR WEIGHT LOSS: Primary | ICD-10-CM

## 2020-01-13 PROCEDURE — 96365 THER/PROPH/DIAG IV INF INIT: CPT

## 2020-01-13 RX ORDER — SODIUM CHLORIDE 9 MG/ML
20 INJECTION, SOLUTION INTRAVENOUS ONCE
Status: COMPLETED | OUTPATIENT
Start: 2020-01-13 | End: 2020-01-13

## 2020-01-13 RX ORDER — SODIUM CHLORIDE 9 MG/ML
20 INJECTION, SOLUTION INTRAVENOUS ONCE
Status: CANCELLED | OUTPATIENT
Start: 2020-01-20

## 2020-01-13 RX ADMIN — IRON SUCROSE 200 MG: 20 INJECTION, SOLUTION INTRAVENOUS at 13:33

## 2020-01-13 RX ADMIN — SODIUM CHLORIDE 20 ML/HR: 9 INJECTION, SOLUTION INTRAVENOUS at 13:30

## 2020-01-13 NOTE — TELEPHONE ENCOUNTER
[facility] needs an ambulatory referral to be put into [facility] for her apt on 1/15/20

## 2020-01-14 ENCOUNTER — TELEPHONE (OUTPATIENT)
Dept: OBGYN CLINIC | Facility: CLINIC | Age: 50
End: 2020-01-14

## 2020-01-16 ENCOUNTER — OFFICE VISIT (OUTPATIENT)
Dept: FAMILY MEDICINE CLINIC | Facility: CLINIC | Age: 50
End: 2020-01-16

## 2020-01-16 VITALS
BODY MASS INDEX: 41.29 KG/M2 | HEART RATE: 101 BPM | SYSTOLIC BLOOD PRESSURE: 138 MMHG | DIASTOLIC BLOOD PRESSURE: 84 MMHG | TEMPERATURE: 97.6 F | OXYGEN SATURATION: 97 % | HEIGHT: 63 IN | RESPIRATION RATE: 18 BRPM | WEIGHT: 233 LBS

## 2020-01-16 DIAGNOSIS — R05.9 COUGH: ICD-10-CM

## 2020-01-16 DIAGNOSIS — J20.9 ACUTE BRONCHITIS, UNSPECIFIED ORGANISM: Primary | ICD-10-CM

## 2020-01-16 PROCEDURE — 3008F BODY MASS INDEX DOCD: CPT | Performed by: FAMILY MEDICINE

## 2020-01-16 PROCEDURE — 99213 OFFICE O/P EST LOW 20 MIN: CPT | Performed by: FAMILY MEDICINE

## 2020-01-16 RX ORDER — ALBUTEROL SULFATE 90 UG/1
2 AEROSOL, METERED RESPIRATORY (INHALATION) EVERY 6 HOURS PRN
Qty: 1 INHALER | Refills: 0 | Status: SHIPPED | OUTPATIENT
Start: 2020-01-16

## 2020-01-16 RX ORDER — AZITHROMYCIN 250 MG/1
TABLET, FILM COATED ORAL
Qty: 6 TABLET | Refills: 0 | Status: SHIPPED | OUTPATIENT
Start: 2020-01-16 | End: 2020-01-20

## 2020-01-16 RX ORDER — BENZONATATE 100 MG/1
100 CAPSULE ORAL 3 TIMES DAILY PRN
Qty: 30 CAPSULE | Refills: 0 | Status: SHIPPED | OUTPATIENT
Start: 2020-01-16

## 2020-01-16 NOTE — PROGRESS NOTES
Assessment/Plan:    Acute bronchitis  - Z pack prescribed  - Tessalon perles TID prn for cough  - Robitussin prn for mucolytic   - Albuterol 90mcg 2 puff Q4-6 H prn wheezing   - Reviewed RTC precautions  - If no improvement in symptoms in one week or cough worsens obtain CXR and f/u        Diagnoses and all orders for this visit:    Acute bronchitis, unspecified organism  -     azithromycin (ZITHROMAX) 250 mg tablet; Take 500 mg (2 tabs) on day 1 followed by 250 mg ( 1 tab) daily for next 4 days  -     benzonatate (TESSALON PERLES) 100 mg capsule; Take 1 capsule (100 mg total) by mouth 3 (three) times a day as needed for cough  -     guaiFENesin (ROBITUSSIN) 100 MG/5ML oral liquid; Take 10 mL (200 mg total) by mouth 3 (three) times a day as needed for cough  -     albuterol (PROVENTIL HFA,VENTOLIN HFA) 90 mcg/act inhaler; Inhale 2 puffs every 6 (six) hours as needed for wheezing or shortness of breath    Cough  -     XR chest pa & lateral; Future          Subjective:      Patient ID: Alan Mccauley is a 44 y o  female  44 y o female presents as acute visit for C/O cough, left ear pain  Cough x 2 months, increased over the past few days, productive of yellow green mucous  Denies fevers, chills  Has had wheezing on and off  Tried mucinex with no improvement  Has a H/O mild intermittent asthma, unable to recollect last albuterol usage  Ear pain - left sided, onset 2 days ago, no ear discharge, no ear fullness, no hard of hearing, no tinnitus  Feels that her adjoining left side neck is swollen  Denies any lymphadenopathy  C/O some sore throat when eating    States she received flu vaccine this year  Her daughter has a sinus infection and is on Abx  The following portions of the patient's history were reviewed and updated as appropriate: She  has a past medical history of Anxiety, Asthma, Hypertension, Migraine, Palpitations, and Psychiatric disorder    Patient Active Problem List    Diagnosis Date Noted  Acute bronchitis 2020    Iron deficiency anemia, unspecified 2019    Women's annual routine gynecological examination 12/10/2019    Encounter for medical examination to establish care 2019    Onychomycosis 2019    Obesity (BMI 35 0-39 9 without comorbidity) 2019    Encounter for screening colonoscopy 2019     She  has a past surgical history that includes  section; Hysterectomy; Femur fracture surgery; Cholecystectomy; Gastric bypass; Hernia repair; Fracture surgery; Fracture surgery; and Shoulder surgery (Right)  Her family history includes Breast cancer in her maternal aunt; Diabetes in her father and maternal grandmother; Hernia in her mother; Hypertension in her father and maternal grandmother; Ovarian cancer in her maternal aunt; Thyroid disease in her mother  She  reports that she has been smoking  She has been smoking about 0 50 packs per day  She has never used smokeless tobacco  She reports that she does not drink alcohol or use drugs    Current Outpatient Medications   Medication Sig Dispense Refill    albuterol (PROVENTIL HFA,VENTOLIN HFA) 90 mcg/act inhaler Inhale 2 puffs every 6 (six) hours as needed for wheezing or shortness of breath 1 Inhaler 0    azithromycin (ZITHROMAX) 250 mg tablet Take 500 mg (2 tabs) on day 1 followed by 250 mg ( 1 tab) daily for next 4 days 6 tablet 0    benzonatate (TESSALON PERLES) 100 mg capsule Take 1 capsule (100 mg total) by mouth 3 (three) times a day as needed for cough 30 capsule 0    ferrous sulfate 325 (65 Fe) mg tablet Take 1 tablet (325 mg total) by mouth daily with breakfast 30 tablet 0    guaiFENesin (ROBITUSSIN) 100 MG/5ML oral liquid Take 10 mL (200 mg total) by mouth 3 (three) times a day as needed for cough 120 mL 0    LORazepam (ATIVAN) 0 5 mg tablet Take 0 5 mg by mouth daily as needed  2    QUEtiapine (SEROquel) 100 mg tablet Take 100 mg by mouth daily at bedtime      venlafaxine (EFFEXOR) 50 mg tablet Take 225 mg by mouth daily        No current facility-administered medications for this visit  Current Outpatient Medications on File Prior to Visit   Medication Sig    ferrous sulfate 325 (65 Fe) mg tablet Take 1 tablet (325 mg total) by mouth daily with breakfast    LORazepam (ATIVAN) 0 5 mg tablet Take 0 5 mg by mouth daily as needed    QUEtiapine (SEROquel) 100 mg tablet Take 100 mg by mouth daily at bedtime    venlafaxine (EFFEXOR) 50 mg tablet Take 225 mg by mouth daily      No current facility-administered medications on file prior to visit  She is allergic to codeine; latex; morphine; morphine and related; sulfa antibiotics; and tylenol [acetaminophen]       Review of Systems   Constitutional: Negative for chills and fever  HENT: Positive for ear pain, sinus pressure and sore throat  Negative for congestion, ear discharge, rhinorrhea, sinus pain and trouble swallowing  Eyes: Negative for visual disturbance  Respiratory: Positive for cough and wheezing  Negative for shortness of breath  Cardiovascular: Negative for chest pain, palpitations and leg swelling  Gastrointestinal: Negative for abdominal pain, diarrhea, nausea and vomiting  Genitourinary: Negative for dysuria and hematuria  Skin: Negative for rash  Neurological: Negative for headaches  Hematological: Negative for adenopathy  Objective:      /84 (BP Location: Right arm, Patient Position: Sitting, Cuff Size: Standard)   Pulse 101   Temp 97 6 °F (36 4 °C) (Temporal)   Resp 18   Ht 5' 2 5" (1 588 m)   Wt 106 kg (233 lb)   SpO2 97%   BMI 41 94 kg/m²          Physical Exam   Constitutional: She is oriented to person, place, and time  She appears well-developed and well-nourished  No distress  HENT:   Head: Normocephalic and atraumatic  Right Ear: External ear and ear canal normal  Tympanic membrane is scarred   Tympanic membrane is not injected, not erythematous, not retracted and not bulging  Left Ear: Tympanic membrane, external ear and ear canal normal  Tympanic membrane is not injected, not scarred, not perforated, not erythematous, not retracted and not bulging  Nose: Nose normal  Right sinus exhibits no maxillary sinus tenderness and no frontal sinus tenderness  Left sinus exhibits no maxillary sinus tenderness and no frontal sinus tenderness  Mouth/Throat: Oropharynx is clear and moist  No oropharyngeal exudate or posterior oropharyngeal erythema  No tonsillar exudate  Eyes: Conjunctivae and EOM are normal  Right eye exhibits no discharge  Left eye exhibits no discharge  Neck: Normal range of motion  Cardiovascular: Normal rate, regular rhythm and normal heart sounds  Exam reveals no gallop and no friction rub  No murmur heard  Pulmonary/Chest: Effort normal  No stridor  No respiratory distress  She has no wheezes  She has no rales  Abdominal: Soft  She exhibits no distension  There is no tenderness  There is no rebound and no guarding  Musculoskeletal: She exhibits no edema or tenderness  Lymphadenopathy:     She has no cervical adenopathy  Neurological: She is alert and oriented to person, place, and time  Skin: Skin is warm  No rash noted  She is not diaphoretic  Vitals reviewed

## 2020-01-17 NOTE — ASSESSMENT & PLAN NOTE
- Z pack prescribed  - Tessalon perles TID prn for cough  - Robitussin prn for mucolytic   - Albuterol 90mcg 2 puff Q4-6 H prn wheezing   - Reviewed RTC precautions  - If no improvement in symptoms in one week or cough worsens obtain CXR and f/u

## 2020-01-20 ENCOUNTER — HOSPITAL ENCOUNTER (OUTPATIENT)
Dept: INFUSION CENTER | Facility: HOSPITAL | Age: 50
Discharge: HOME | End: 2020-01-20
Attending: INTERNAL MEDICINE
Payer: COMMERCIAL

## 2020-01-20 VITALS
TEMPERATURE: 98.7 F | RESPIRATION RATE: 18 BRPM | SYSTOLIC BLOOD PRESSURE: 125 MMHG | HEART RATE: 103 BPM | DIASTOLIC BLOOD PRESSURE: 58 MMHG | OXYGEN SATURATION: 97 %

## 2020-01-20 DIAGNOSIS — D50.9 IRON DEFICIENCY ANEMIA, UNSPECIFIED IRON DEFICIENCY ANEMIA TYPE: Primary | ICD-10-CM

## 2020-01-20 PROCEDURE — 96365 THER/PROPH/DIAG IV INF INIT: CPT

## 2020-01-20 RX ORDER — SODIUM CHLORIDE 9 MG/ML
20 INJECTION, SOLUTION INTRAVENOUS ONCE
Status: COMPLETED | OUTPATIENT
Start: 2020-01-20 | End: 2020-01-20

## 2020-01-20 RX ORDER — SODIUM CHLORIDE 9 MG/ML
20 INJECTION, SOLUTION INTRAVENOUS ONCE
Status: CANCELLED | OUTPATIENT
Start: 2020-01-27

## 2020-01-20 RX ADMIN — IRON SUCROSE 200 MG: 20 INJECTION, SOLUTION INTRAVENOUS at 13:28

## 2020-01-20 RX ADMIN — SODIUM CHLORIDE 20 ML/HR: 9 INJECTION, SOLUTION INTRAVENOUS at 13:28

## 2020-01-27 ENCOUNTER — HOSPITAL ENCOUNTER (OUTPATIENT)
Dept: INFUSION CENTER | Facility: HOSPITAL | Age: 50
Discharge: HOME | End: 2020-01-27
Attending: INTERNAL MEDICINE
Payer: COMMERCIAL

## 2020-01-27 VITALS
TEMPERATURE: 98.5 F | DIASTOLIC BLOOD PRESSURE: 82 MMHG | SYSTOLIC BLOOD PRESSURE: 139 MMHG | HEART RATE: 115 BPM | OXYGEN SATURATION: 97 % | RESPIRATION RATE: 18 BRPM

## 2020-01-27 DIAGNOSIS — D50.9 IRON DEFICIENCY ANEMIA, UNSPECIFIED IRON DEFICIENCY ANEMIA TYPE: Primary | ICD-10-CM

## 2020-01-27 PROCEDURE — 96365 THER/PROPH/DIAG IV INF INIT: CPT

## 2020-01-27 RX ORDER — SODIUM CHLORIDE 9 MG/ML
20 INJECTION, SOLUTION INTRAVENOUS ONCE
Status: COMPLETED | OUTPATIENT
Start: 2020-01-27 | End: 2020-01-27

## 2020-01-27 RX ORDER — SODIUM CHLORIDE 9 MG/ML
20 INJECTION, SOLUTION INTRAVENOUS ONCE
Status: CANCELLED | OUTPATIENT
Start: 2020-02-03

## 2020-01-27 RX ADMIN — IRON SUCROSE 200 MG: 20 INJECTION, SOLUTION INTRAVENOUS at 10:37

## 2020-01-27 RX ADMIN — SODIUM CHLORIDE 20 ML/HR: 9 INJECTION, SOLUTION INTRAVENOUS at 10:34

## 2020-01-28 ENCOUNTER — OFFICE VISIT (OUTPATIENT)
Dept: OBGYN CLINIC | Facility: CLINIC | Age: 50
End: 2020-01-28

## 2020-01-28 VITALS — BODY MASS INDEX: 41.4 KG/M2 | DIASTOLIC BLOOD PRESSURE: 85 MMHG | SYSTOLIC BLOOD PRESSURE: 124 MMHG | WEIGHT: 230 LBS

## 2020-01-28 DIAGNOSIS — Z71.2 ENCOUNTER TO DISCUSS TEST RESULTS: Primary | ICD-10-CM

## 2020-01-28 PROCEDURE — 99213 OFFICE O/P EST LOW 20 MIN: CPT | Performed by: OBSTETRICS & GYNECOLOGY

## 2020-01-28 NOTE — PROGRESS NOTES
Assessment/Plan:     No problem-specific Assessment & Plan notes found for this encounter  Diagnoses and all orders for this visit:    Encounter to discuss test results    Mammogram scheduled for next month  RTO in one year or as needed  Subjective:      Patient ID: Vern Broderick is a 44 y o  female presents for US results  Pt was unsure of which GYN organs were present/retained  See US results below  This was reviewed with the pt  Pap smear was reviewed with the pt  Primary Interpretation   Negative for intraepithelial lesion or malignancy   Electronically signed by EUGENIO Canchola on 12/16/2019 at  4:14 PM   Specimen Adequacy Satisfactory for evaluation  Endocervical/transformation zone component present  *Note though no cervix seen on US, TZ was noted on pap  *      HPI    The following portions of the patient's history were reviewed and updated as appropriate: allergies, current medications, past family history, past medical history, past social history, past surgical history and problem list     Review of Systems      Objective:      /85   Wt 104 kg (230 lb)   BMI 41 40 kg/m²     UTERUS:  Status post hysterectomy      Fluid-filled vaginal cuff  An internal echogenic structure measures 4 mm      OVARIES/ADNEXA:  Ovaries are not visualized         There is no free fluid      IMPRESSION:     Fluid-filled nodular cough with into internal 4 mm echogenic structure  Consider follow-up with direct visualization         Physical Exam   Constitutional: She is oriented to person, place, and time  She appears well-developed and well-nourished  No distress  Pulmonary/Chest: Effort normal    Neurological: She is alert and oriented to person, place, and time  Psychiatric: She has a normal mood and affect  Her behavior is normal    Nursing note and vitals reviewed

## 2020-02-03 ENCOUNTER — HOSPITAL ENCOUNTER (OUTPATIENT)
Dept: INFUSION CENTER | Facility: HOSPITAL | Age: 50
Discharge: HOME | End: 2020-02-03
Attending: INTERNAL MEDICINE
Payer: COMMERCIAL

## 2020-02-03 VITALS
DIASTOLIC BLOOD PRESSURE: 87 MMHG | HEART RATE: 109 BPM | OXYGEN SATURATION: 96 % | TEMPERATURE: 97.8 F | SYSTOLIC BLOOD PRESSURE: 136 MMHG | RESPIRATION RATE: 18 BRPM

## 2020-02-03 DIAGNOSIS — D50.9 IRON DEFICIENCY ANEMIA, UNSPECIFIED IRON DEFICIENCY ANEMIA TYPE: Primary | ICD-10-CM

## 2020-02-03 PROCEDURE — 96365 THER/PROPH/DIAG IV INF INIT: CPT

## 2020-02-03 RX ORDER — SODIUM CHLORIDE 9 MG/ML
20 INJECTION, SOLUTION INTRAVENOUS ONCE
Status: COMPLETED | OUTPATIENT
Start: 2020-02-03 | End: 2020-02-03

## 2020-02-03 RX ORDER — SODIUM CHLORIDE 9 MG/ML
20 INJECTION, SOLUTION INTRAVENOUS ONCE
Status: CANCELLED | OUTPATIENT
Start: 2020-02-10

## 2020-02-03 RX ADMIN — IRON SUCROSE 200 MG: 20 INJECTION, SOLUTION INTRAVENOUS at 13:41

## 2020-02-03 RX ADMIN — SODIUM CHLORIDE 20 ML/HR: 9 INJECTION, SOLUTION INTRAVENOUS at 13:41

## 2020-02-10 ENCOUNTER — HOSPITAL ENCOUNTER (OUTPATIENT)
Dept: INFUSION CENTER | Facility: HOSPITAL | Age: 50
End: 2020-02-10
Attending: INTERNAL MEDICINE

## 2020-02-12 ENCOUNTER — HOSPITAL ENCOUNTER (OUTPATIENT)
Dept: INFUSION CENTER | Facility: HOSPITAL | Age: 50
Discharge: HOME | End: 2020-02-12
Attending: INTERNAL MEDICINE
Payer: COMMERCIAL

## 2020-02-12 VITALS
TEMPERATURE: 98.7 F | SYSTOLIC BLOOD PRESSURE: 150 MMHG | RESPIRATION RATE: 18 BRPM | HEART RATE: 105 BPM | OXYGEN SATURATION: 98 % | DIASTOLIC BLOOD PRESSURE: 91 MMHG

## 2020-02-12 DIAGNOSIS — D50.9 IRON DEFICIENCY ANEMIA, UNSPECIFIED IRON DEFICIENCY ANEMIA TYPE: Primary | ICD-10-CM

## 2020-02-12 PROCEDURE — 96365 THER/PROPH/DIAG IV INF INIT: CPT

## 2020-02-12 RX ORDER — SODIUM CHLORIDE 9 MG/ML
20 INJECTION, SOLUTION INTRAVENOUS ONCE
Status: COMPLETED | OUTPATIENT
Start: 2020-02-12 | End: 2020-02-12

## 2020-02-12 RX ORDER — SODIUM CHLORIDE 9 MG/ML
20 INJECTION, SOLUTION INTRAVENOUS ONCE
Status: CANCELLED | OUTPATIENT
Start: 2020-02-19

## 2020-02-12 RX ADMIN — IRON SUCROSE 200 MG: 20 INJECTION, SOLUTION INTRAVENOUS at 13:30

## 2020-02-12 RX ADMIN — SODIUM CHLORIDE 20 ML/HR: 9 INJECTION, SOLUTION INTRAVENOUS at 13:30

## 2020-02-12 NOTE — PROGRESS NOTES
Patient tolerated venofer infusion with no adverse reactions  Refused AVS, left unit ambulatory with steady gait

## 2020-02-13 ENCOUNTER — HOSPITAL ENCOUNTER (OUTPATIENT)
Dept: RADIOLOGY | Facility: HOSPITAL | Age: 50
Discharge: HOME | End: 2020-02-13
Payer: COMMERCIAL

## 2020-02-13 DIAGNOSIS — R05.9 COUGH: ICD-10-CM

## 2020-02-13 PROCEDURE — 71046 X-RAY EXAM CHEST 2 VIEWS: CPT

## 2020-02-18 ENCOUNTER — HOSPITAL ENCOUNTER (OUTPATIENT)
Dept: INFUSION CENTER | Facility: HOSPITAL | Age: 50
Discharge: HOME | End: 2020-02-18
Attending: INTERNAL MEDICINE
Payer: COMMERCIAL

## 2020-02-18 VITALS
SYSTOLIC BLOOD PRESSURE: 147 MMHG | HEART RATE: 90 BPM | DIASTOLIC BLOOD PRESSURE: 89 MMHG | RESPIRATION RATE: 18 BRPM | OXYGEN SATURATION: 97 % | TEMPERATURE: 98.8 F

## 2020-02-18 DIAGNOSIS — D50.9 IRON DEFICIENCY ANEMIA, UNSPECIFIED IRON DEFICIENCY ANEMIA TYPE: Primary | ICD-10-CM

## 2020-02-18 PROCEDURE — 96365 THER/PROPH/DIAG IV INF INIT: CPT

## 2020-02-18 RX ORDER — SODIUM CHLORIDE 9 MG/ML
20 INJECTION, SOLUTION INTRAVENOUS ONCE
Status: CANCELLED | OUTPATIENT
Start: 2020-02-25

## 2020-02-18 RX ORDER — SODIUM CHLORIDE 9 MG/ML
20 INJECTION, SOLUTION INTRAVENOUS ONCE
Status: COMPLETED | OUTPATIENT
Start: 2020-02-18 | End: 2020-02-18

## 2020-02-18 RX ADMIN — SODIUM CHLORIDE 20 ML/HR: 9 INJECTION, SOLUTION INTRAVENOUS at 13:35

## 2020-02-18 RX ADMIN — IRON SUCROSE 200 MG: 20 INJECTION, SOLUTION INTRAVENOUS at 13:35

## 2020-02-18 NOTE — PLAN OF CARE
Problem: Potential for Falls  Goal: Patient will remain free of falls  Description  INTERVENTIONS:  - Assess patient frequently for physical needs  -  Identify cognitive and physical deficits and behaviors that affect risk of falls    -  [place] fall precautions as indicated by assessment   - Educate patient/family on patient safety including physical limitations  - Instruct patient to call for assistance with activity based on assessment  - Modify environment to reduce risk of injury  - Consider OT/PT consult to assist with strengthening/mobility  Outcome: Progressing

## 2020-02-18 NOTE — PROGRESS NOTES
Venofer infusion completed with no adverse reactions  PIV site removed, patient left unit ambulatory with steady gait

## 2020-02-19 ENCOUNTER — HOSPITAL ENCOUNTER (OUTPATIENT)
Dept: MAMMOGRAPHY | Facility: CLINIC | Age: 50
Discharge: HOME | End: 2020-02-19
Payer: COMMERCIAL

## 2020-02-19 VITALS — HEIGHT: 63 IN | BODY MASS INDEX: 40.75 KG/M2 | WEIGHT: 230 LBS

## 2020-02-19 DIAGNOSIS — Z12.31 SCREENING MAMMOGRAM, ENCOUNTER FOR: ICD-10-CM

## 2020-02-19 PROCEDURE — 77063 BREAST TOMOSYNTHESIS BI: CPT

## 2020-02-19 PROCEDURE — 77067 SCR MAMMO BI INCL CAD: CPT

## 2020-02-20 ENCOUNTER — OFFICE VISIT (OUTPATIENT)
Dept: GASTROENTEROLOGY | Facility: MEDICAL CENTER | Age: 50
End: 2020-02-20
Payer: COMMERCIAL

## 2020-02-20 VITALS
HEIGHT: 63 IN | HEART RATE: 85 BPM | WEIGHT: 236 LBS | TEMPERATURE: 97.3 F | SYSTOLIC BLOOD PRESSURE: 128 MMHG | BODY MASS INDEX: 41.82 KG/M2 | DIASTOLIC BLOOD PRESSURE: 84 MMHG

## 2020-02-20 DIAGNOSIS — K92.1 BLOOD IN STOOL: ICD-10-CM

## 2020-02-20 DIAGNOSIS — Z98.84 STATUS FOLLOWING GASTRIC BYPASS FOR WEIGHT LOSS: Primary | ICD-10-CM

## 2020-02-20 DIAGNOSIS — E66.01 MORBID OBESITY (HCC): ICD-10-CM

## 2020-02-20 PROCEDURE — 99244 OFF/OP CNSLTJ NEW/EST MOD 40: CPT | Performed by: INTERNAL MEDICINE

## 2020-02-20 NOTE — PATIENT INSTRUCTIONS
The patient is scheduled at [facility]  for a Colon/EGD  with Dr Ted Davis  on 04/28/2020  Golytely prep instructions have been gone over in the office, with the patient, by the MA  The patient is aware that they will receive a call with the arrival time the day prior to procedure  The patient is aware they will need a ride to and from the procedure  The patient was given her referral to Nutrition  Patient was also scheduled for her 2 month follow up

## 2020-02-20 NOTE — PROGRESS NOTES
[facility] - Outpatient Consultation  Amy Saavedra 44 y o  female MRN: [id number]  Encounter: [id number]          ASSESSMENT AND PLAN:    1  Status following gastric bypass for weight loss  2  Blood in stool  3  Morbid obesity ([address] )  4  Iron deficiency anemia      Plan for EGD to evaluate PUD  One episode rectal bleeding might be cause by hemorrhoids  Continue dulcolax  Colonoscopy to evaluation for rectal bleeding and also for screening purposes  Refer to nutrition consult     - polyethylene glycol (GOLYTELY) 4000 mL solution; Take 4,000 mL by mouth once for 1 dose  Dispense: 4000 mL; Refill: 0  - bisacodyl (DULCOLAX) 5 mg EC tablet; Take 2 tablets (10 mg total) by mouth once for 1 dose  Dispense: 2 tablet; Refill: 0  - EGD; Future  - Colonoscopy; Future    Follow up after procedure         ______________________________________________________________________    HPI:      76-year-old female with past medical history of morbid obesity status post gastric bypass surgery in 2003 referred for evaluation of acute onset of anemia and blood in the stool  Patient reported she noticed 1 episode of bright red blood per rectum after bowel movements around a month ago  She denies nausea vomiting or abdominal pain  She denies black stool  She reported having small pellet lumpy stools all the time  She moved her bowel once or twice a week  She is taking Dulcolax for constipation  She lost around 200 lb after her gastric bypass surgery in 2003  She reported she has been compliant with iron supplement  Her new onset anemia was consistent with iron deficiency  She received iron infusion with improvement of her hemoglobin  She has been on iron supplement also  REVIEW OF SYSTEMS:    CONSTITUTIONAL: Denies any fever, chills, rigors, and weight loss  HEENT: No earache or tinnitus  Denies hearing loss or visual disturbances  CARDIOVASCULAR: No chest pain or palpitations  RESPIRATORY: Denies any cough, hemoptysis, shortness of breath or dyspnea on exertion  GASTROINTESTINAL: As noted in the History of Present Illness  GENITOURINARY: No problems with urination  Denies any hematuria or dysuria  NEUROLOGIC: No dizziness or vertigo, denies headaches  MUSCULOSKELETAL: Denies any muscle or joint pain  SKIN: Denies skin rashes or itching  ENDOCRINE: Denies excessive thirst  Denies intolerance to heat or cold  PSYCHOSOCIAL: Denies depression or anxiety  Denies any recent memory loss         Historical Information   Past Medical History:   Diagnosis Date    Anxiety     Asthma     Hypertension     Migraine     Palpitations     Psychiatric disorder      Past Surgical History:   Procedure Laterality Date     SECTION      CHOLECYSTECTOMY      FEMUR FRACTURE SURGERY      FRACTURE SURGERY      Treatment of fractures of both radial and ulnar shafts     FRACTURE SURGERY      Treatment pf tibial shaft fracture with plate/screws    GASTRIC BYPASS      HERNIA REPAIR      HYSTERECTOMY      SHOULDER SURGERY Right      Social History   Social History     Substance and Sexual Activity   Alcohol Use No     Social History     Substance and Sexual Activity   Drug Use No     Social History     Tobacco Use   Smoking Status Current Every Day Smoker    Packs/day: 0 50   Smokeless Tobacco Never Used     Family History   Problem Relation Age of Onset    Diabetes Father     Hypertension Father     Diabetes Maternal Grandmother     Hypertension Maternal Grandmother     Breast cancer Maternal Aunt     Ovarian cancer Maternal Aunt     Thyroid disease Mother     Hernia Mother        Meds/Allergies       Current Outpatient Medications:     albuterol (PROVENTIL HFA,VENTOLIN HFA) 90 mcg/act inhaler    ferrous sulfate 325 (65 Fe) mg tablet    LORazepam (ATIVAN) 0 5 mg tablet    QUEtiapine (SEROquel) 100 mg tablet    venlafaxine (EFFEXOR) 50 mg tablet    benzonatate (TESSALON PERLES) 100 mg capsule    bisacodyl (DULCOLAX) 5 mg EC tablet    guaiFENesin (ROBITUSSIN) 100 MG/5ML oral liquid    polyethylene glycol (GOLYTELY) 4000 mL solution    Allergies   Allergen Reactions    Codeine     Latex     Morphine     Morphine And Related     Sulfa Antibiotics     Tylenol [Acetaminophen]            Objective     Blood pressure 128/84, pulse 85, temperature (!) 97 3 °F (36 3 °C), temperature source Tympanic, height 5' 2 5" (1 588 m), weight 107 kg (236 lb)  Body mass index is 42 48 kg/m²  PHYSICAL EXAM:      General Appearance:   Alert, cooperative, no distress   HEENT:   Normocephalic, atraumatic, anicteric      Neck:  Supple, symmetrical, trachea midline   Lungs:   Clear to auscultation bilaterally; no rales, rhonchi or wheezing; respirations unlabored    Heart[de-identified]   Regular rate and rhythm; no murmur, rub, or gallop  Abdomen:   Soft, non-tender, non-distended; normal bowel sounds; no masses, no organomegaly    Genitalia:   Deferred    Rectal:   Deferred    Extremities:  No cyanosis, clubbing or edema    Pulses:  2+ and symmetric    Skin:  No jaundice, rashes, or lesions    Lymph nodes:  No palpable cervical lymphadenopathy        Lab Results:   No visits with results within 1 Day(s) from this visit     Latest known visit with results is:   Appointment on 12/18/2019   Component Date Value    WBC 12/18/2019 8 98     RBC 12/18/2019 4 77     Hemoglobin 12/18/2019 9 1*    Hematocrit 12/18/2019 35 3     MCV 12/18/2019 74*    MCH 12/18/2019 19 1*    MCHC 12/18/2019 25 8*    RDW 12/18/2019 24 5*    MPV 12/18/2019 9 3     Platelets 43/76/1111 437*    nRBC 12/18/2019 0     Neutrophils Relative 12/18/2019 67     Immat GRANS % 12/18/2019 0     Lymphocytes Relative 12/18/2019 20     Monocytes Relative 12/18/2019 9     Eosinophils Relative 12/18/2019 3     Basophils Relative 12/18/2019 1     Neutrophils Absolute 12/18/2019 5 98     Immature Grans Absolute 12/18/2019 0 04     Lymphocytes Absolute 12/18/2019 1 77     Monocytes Absolute 12/18/2019 0 82     Eosinophils Absolute 12/18/2019 0 26     Basophils Absolute 12/18/2019 0 11*    Sodium 12/18/2019 139     Potassium 12/18/2019 3 8     Chloride 12/18/2019 105     CO2 12/18/2019 30     ANION GAP 12/18/2019 4     BUN 12/18/2019 15     Creatinine 12/18/2019 0 77     Glucose 12/18/2019 86     Calcium 12/18/2019 9 1     AST 12/18/2019 20     ALT 12/18/2019 21     Alkaline Phosphatase 12/18/2019 119*    Total Protein 12/18/2019 7 8     Albumin 12/18/2019 3 7     Total Bilirubin 12/18/2019 0 25     eGFR 12/18/2019 90          Radiology Results:   Xr Chest Pa & Lateral    Result Date: 2/14/2020  Narrative: CHEST INDICATION:   R05: Cough  COMPARISON:  2/4/2017  EXAM PERFORMED/VIEWS:  XR CHEST PA & LATERAL FINDINGS: Cardiomediastinal silhouette appears unremarkable  The lungs are clear  No pneumothorax or pleural effusion  Osseous structures appear within normal limits for patient age  Impression: No acute cardiopulmonary disease   Workstation performed: [id number]

## 2020-02-24 ENCOUNTER — HOSPITAL ENCOUNTER (OUTPATIENT)
Dept: INFUSION CENTER | Facility: HOSPITAL | Age: 50
Discharge: HOME | End: 2020-02-24
Attending: INTERNAL MEDICINE
Payer: COMMERCIAL

## 2020-02-24 VITALS
OXYGEN SATURATION: 96 % | DIASTOLIC BLOOD PRESSURE: 93 MMHG | RESPIRATION RATE: 18 BRPM | HEART RATE: 99 BPM | SYSTOLIC BLOOD PRESSURE: 136 MMHG | TEMPERATURE: 99.2 F

## 2020-02-24 DIAGNOSIS — D50.9 IRON DEFICIENCY ANEMIA, UNSPECIFIED IRON DEFICIENCY ANEMIA TYPE: Primary | ICD-10-CM

## 2020-02-24 PROCEDURE — 96365 THER/PROPH/DIAG IV INF INIT: CPT

## 2020-02-24 RX ORDER — SODIUM CHLORIDE 9 MG/ML
20 INJECTION, SOLUTION INTRAVENOUS ONCE
Status: CANCELLED | OUTPATIENT
Start: 2020-03-02

## 2020-02-24 RX ORDER — SODIUM CHLORIDE 9 MG/ML
20 INJECTION, SOLUTION INTRAVENOUS ONCE
Status: COMPLETED | OUTPATIENT
Start: 2020-02-24 | End: 2020-02-24

## 2020-02-24 RX ADMIN — IRON SUCROSE 200 MG: 20 INJECTION, SOLUTION INTRAVENOUS at 11:15

## 2020-02-24 RX ADMIN — SODIUM CHLORIDE 20 ML/HR: 9 INJECTION, SOLUTION INTRAVENOUS at 11:15

## 2020-03-02 ENCOUNTER — HOSPITAL ENCOUNTER (OUTPATIENT)
Dept: INFUSION CENTER | Facility: HOSPITAL | Age: 50
Discharge: HOME | End: 2020-03-02
Attending: INTERNAL MEDICINE
Payer: COMMERCIAL

## 2020-03-02 VITALS
TEMPERATURE: 98.4 F | OXYGEN SATURATION: 97 % | SYSTOLIC BLOOD PRESSURE: 136 MMHG | DIASTOLIC BLOOD PRESSURE: 95 MMHG | HEART RATE: 97 BPM | RESPIRATION RATE: 18 BRPM

## 2020-03-02 DIAGNOSIS — D50.9 IRON DEFICIENCY ANEMIA, UNSPECIFIED IRON DEFICIENCY ANEMIA TYPE: Primary | ICD-10-CM

## 2020-03-02 PROCEDURE — 96365 THER/PROPH/DIAG IV INF INIT: CPT

## 2020-03-02 RX ORDER — SODIUM CHLORIDE 9 MG/ML
20 INJECTION, SOLUTION INTRAVENOUS ONCE
Status: CANCELLED | OUTPATIENT
Start: 2020-03-09

## 2020-03-02 RX ORDER — SODIUM CHLORIDE 9 MG/ML
20 INJECTION, SOLUTION INTRAVENOUS ONCE
Status: COMPLETED | OUTPATIENT
Start: 2020-03-02 | End: 2020-03-02

## 2020-03-02 RX ADMIN — IRON SUCROSE 200 MG: 20 INJECTION, SOLUTION INTRAVENOUS at 12:20

## 2020-03-02 RX ADMIN — SODIUM CHLORIDE 20 ML/HR: 9 INJECTION, SOLUTION INTRAVENOUS at 12:20

## 2020-03-02 NOTE — PLAN OF CARE
Problem: Potential for Falls  Goal: Patient will remain free of falls  Description  INTERVENTIONS:  - Assess patient frequently for physical needs  -  Identify cognitive and physical deficits and behaviors that affect risk of falls  -  [place] fall precautions as indicated by assessment   - Educate patient/family on patient safety including physical limitations  - Instruct patient to call for assistance with activity based on assessment  - Modify environment to reduce risk of injury  - Consider OT/PT consult to assist with strengthening/mobility  Outcome: Progressing     Problem: Knowledge Deficit  Goal: Patient/family/caregiver demonstrates understanding of disease process, treatment plan, medications, and discharge instructions  Description  Complete learning assessment and assess knowledge base  Interventions:  - Provide teaching at level of understanding  - Provide teaching via preferred learning methods  Outcome: Progressing     Problem: Potential for Falls  Goal: Patient will remain free of falls  Description  INTERVENTIONS:  - Assess patient frequently for physical needs  -  Identify cognitive and physical deficits and behaviors that affect risk of falls    -  [place] fall precautions as indicated by assessment   - Educate patient/family on patient safety including physical limitations  - Instruct patient to call for assistance with activity based on assessment  - Modify environment to reduce risk of injury  - Consider OT/PT consult to assist with strengthening/mobility  Outcome: Progressing

## 2020-03-03 ENCOUNTER — HOSPITAL ENCOUNTER (OUTPATIENT)
Dept: ULTRASOUND IMAGING | Facility: CLINIC | Age: 50
Discharge: HOME | End: 2020-03-03
Payer: COMMERCIAL

## 2020-03-03 ENCOUNTER — HOSPITAL ENCOUNTER (OUTPATIENT)
Dept: MAMMOGRAPHY | Facility: CLINIC | Age: 50
Discharge: HOME | End: 2020-03-03
Payer: COMMERCIAL

## 2020-03-03 VITALS — BODY MASS INDEX: 41.82 KG/M2 | HEIGHT: 63 IN | WEIGHT: 236 LBS

## 2020-03-03 DIAGNOSIS — R92.8 ABNORMAL SCREENING MAMMOGRAM: ICD-10-CM

## 2020-03-03 PROCEDURE — G0279 TOMOSYNTHESIS, MAMMO: HCPCS

## 2020-03-03 PROCEDURE — 76642 ULTRASOUND BREAST LIMITED: CPT

## 2020-03-03 PROCEDURE — 77065 DX MAMMO INCL CAD UNI: CPT

## 2020-03-03 NOTE — PROGRESS NOTES
Met with patient and Dr Qasim Garza                   regarding recommendation for;      ___x__ RIGHT ______LEFT      __x___Ultrasound guided  ______Stereotactic  Breast biopsy  _____Verbalized understanding  Blood thinners:  _____yes ___x__no    Date stopped: _____x______    Biopsy teaching sheet given:  ____x___yes ______no    Demetri Cabrera states she has history of hysterectomy, asthma, bipolar , depression and anxiety

## 2020-03-04 NOTE — DISCHARGE INSTRUCTIONS
You declined admission to the hospital today  You are anemic, and were transfused 1 unit of packed red blood cells in the emergency department  You need to follow up with gastroenterology, call the number in your discharge instructions to schedule an appointment for a visit as soon as possible  Also, you must follow up with hematology regarding your low hemoglobin level, call to establish an appointment for a visit within 1 week  Return to the emergency department if you experience worsening lightheadedness, shortness of breath, chest pain, abdominal pain, rectal bleeding, if you pass out//feel like you're going to pass out  [facility]...

## 2020-03-09 ENCOUNTER — HOSPITAL ENCOUNTER (OUTPATIENT)
Dept: INFUSION CENTER | Facility: HOSPITAL | Age: 50
Discharge: HOME | End: 2020-03-09
Attending: INTERNAL MEDICINE
Payer: COMMERCIAL

## 2020-03-09 VITALS
HEART RATE: 99 BPM | DIASTOLIC BLOOD PRESSURE: 98 MMHG | SYSTOLIC BLOOD PRESSURE: 157 MMHG | RESPIRATION RATE: 18 BRPM | TEMPERATURE: 98.8 F | OXYGEN SATURATION: 97 %

## 2020-03-09 DIAGNOSIS — D50.9 IRON DEFICIENCY ANEMIA, UNSPECIFIED IRON DEFICIENCY ANEMIA TYPE: Primary | ICD-10-CM

## 2020-03-09 PROCEDURE — 96365 THER/PROPH/DIAG IV INF INIT: CPT

## 2020-03-09 RX ORDER — SODIUM CHLORIDE 9 MG/ML
20 INJECTION, SOLUTION INTRAVENOUS ONCE
Status: CANCELLED | OUTPATIENT
Start: 2020-03-09

## 2020-03-09 RX ORDER — SODIUM CHLORIDE 9 MG/ML
20 INJECTION, SOLUTION INTRAVENOUS ONCE
Status: COMPLETED | OUTPATIENT
Start: 2020-03-09 | End: 2020-03-09

## 2020-03-09 RX ADMIN — SODIUM CHLORIDE 20 ML/HR: 9 INJECTION, SOLUTION INTRAVENOUS at 12:22

## 2020-03-09 RX ADMIN — IRON SUCROSE 200 MG: 20 INJECTION, SOLUTION INTRAVENOUS at 12:21

## 2020-03-13 ENCOUNTER — HOSPITAL ENCOUNTER (OUTPATIENT)
Dept: MAMMOGRAPHY | Facility: CLINIC | Age: 50
Discharge: HOME | End: 2020-03-13
Payer: COMMERCIAL

## 2020-03-13 ENCOUNTER — HOSPITAL ENCOUNTER (OUTPATIENT)
Dept: ULTRASOUND IMAGING | Facility: CLINIC | Age: 50
Discharge: HOME | End: 2020-03-13
Admitting: RADIOLOGY
Payer: COMMERCIAL

## 2020-03-13 VITALS
HEIGHT: 63 IN | WEIGHT: 236 LBS | HEART RATE: 100 BPM | SYSTOLIC BLOOD PRESSURE: 130 MMHG | DIASTOLIC BLOOD PRESSURE: 80 MMHG | BODY MASS INDEX: 41.82 KG/M2

## 2020-03-13 DIAGNOSIS — R92.8 ABNORMAL ULTRASOUND OF BREAST: ICD-10-CM

## 2020-03-13 DIAGNOSIS — Z98.890 STATUS POST BREAST BIOPSY: ICD-10-CM

## 2020-03-13 PROCEDURE — 88312 SPECIAL STAINS GROUP 1: CPT | Performed by: PATHOLOGY

## 2020-03-13 PROCEDURE — 88305 TISSUE EXAM BY PATHOLOGIST: CPT | Performed by: PATHOLOGY

## 2020-03-13 PROCEDURE — 19083 BX BREAST 1ST LESION US IMAG: CPT

## 2020-03-13 RX ORDER — LIDOCAINE HYDROCHLORIDE 10 MG/ML
5 INJECTION, SOLUTION EPIDURAL; INFILTRATION; INTRACAUDAL; PERINEURAL ONCE
Status: DISCONTINUED | OUTPATIENT
Start: 2020-03-13 | End: 2020-03-14 | Stop reason: HOSPADM
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
## 2020-03-13 NOTE — PROGRESS NOTES
Procedure type:    __X___ultrasound guided _____stereotactic    Breast:    _____Left __X___Right    Location: 11 o'clock 13 cm Fn    Needle: 12ga Pretty    # of passes: 3    Clip: Wing(Ultraclip)    Performed by: Dr Demetri Baum held for 5 minutes by:  Lloyd Blake(PCA)    Steri Strips:    ___X__yes _____no    Band aid:    ___X__yes_____no    Tape and guaze:    _____yes ___X__no    Tolerated procedure:    __X___yes _____no

## 2020-03-13 NOTE — DISCHARGE INSTR - OTHER ORDERS
POST LARGE CORE BREAST BIOPSY PATIENT INFORMATION      1  Place an ice pack inside your bra over the top of the dressing every hour for 20 minutes (20 minutes on, 60 minutes off)  Do this until bedtime  2  Do not shower or bathe until the following morning  3  You may bathe your breast carefully with the steri-strips in place  Be careful    Not to loosen them  The steri-strips will fall off in 3-5 days  4  You may have mild discomfort, and you may have some bruising where the   Needle entered the skin  This should clear within 5-7 days  5  If you need medicine for discomfort, take acetaminophen products such as   Tylenol  You may also take Advil or Motrin products  6  Do not participate in strenuous activities such as-tennis, aerobics, skiing,  Weight lifting, etc  for 24 hours  Refrain from swimming/soaking for 72 hours  7  Wearing a bra for sleeping may be more comfortable for the first 24-48 hours  8  Watch for continued bleeding, pain or fever over 101; please call with any questions or concerns  For procedures done at the [facility]  [address] call:  Génesis Mclaughlin RN at [phone]  Asael Cho RN at [phone]                    *After 4 PM call the Interventional Radiology Department                    [phone] and ask to speak with the nurse on call  For procedures done at the [address] [place] [address] call:         Sudheer Kaplan RN at   *After 4 PM call the Interventional Radiology Department   [phone] and ask to speak with the nurse on call  For procedures done at [address] [place] [address] call: The Radiology Nurse at [phone]  *After 4 PM call your physician, or go to the Emergency Department  9          The final results of your biopsy are usually available within one week

## 2020-03-13 NOTE — PROGRESS NOTES
Ice pack given:    ___X__yes _____no    Discharge instructions signed by patient:    ___X__yes _____no    Discharge instructions given to patient:    ___X__yes _____no    Discharged via:    __X___amulatory    _____wheelchair    _____stretcher    Stable on discharge:    ___X__yes ____no

## 2020-03-16 ENCOUNTER — OFFICE VISIT (OUTPATIENT)
Dept: FAMILY MEDICINE CLINIC | Facility: CLINIC | Age: 50
End: 2020-03-16

## 2020-03-16 ENCOUNTER — TELEPHONE (OUTPATIENT)
Dept: FAMILY MEDICINE CLINIC | Facility: CLINIC | Age: 50
End: 2020-03-16

## 2020-03-16 VITALS
RESPIRATION RATE: 16 BRPM | WEIGHT: 232.4 LBS | TEMPERATURE: 97.9 F | BODY MASS INDEX: 41.18 KG/M2 | OXYGEN SATURATION: 94 % | HEART RATE: 104 BPM | SYSTOLIC BLOOD PRESSURE: 136 MMHG | HEIGHT: 63 IN | DIASTOLIC BLOOD PRESSURE: 98 MMHG

## 2020-03-16 DIAGNOSIS — N61.1 ABSCESS OF BREAST: Primary | ICD-10-CM

## 2020-03-16 PROCEDURE — 87205 SMEAR GRAM STAIN: CPT | Performed by: FAMILY MEDICINE

## 2020-03-16 PROCEDURE — 99214 OFFICE O/P EST MOD 30 MIN: CPT | Performed by: FAMILY MEDICINE

## 2020-03-16 PROCEDURE — 3080F DIAST BP >= 90 MM HG: CPT | Performed by: FAMILY MEDICINE

## 2020-03-16 PROCEDURE — 3075F SYST BP GE 130 - 139MM HG: CPT | Performed by: FAMILY MEDICINE

## 2020-03-16 PROCEDURE — 10060 I&D ABSCESS SIMPLE/SINGLE: CPT | Performed by: FAMILY MEDICINE

## 2020-03-16 PROCEDURE — 87070 CULTURE OTHR SPECIMN AEROBIC: CPT | Performed by: FAMILY MEDICINE

## 2020-03-16 PROCEDURE — 3008F BODY MASS INDEX DOCD: CPT | Performed by: FAMILY MEDICINE

## 2020-03-16 RX ORDER — DOXYCYCLINE HYCLATE 100 MG/1
100 TABLET, DELAYED RELEASE ORAL 2 TIMES DAILY
Qty: 20 TABLET | Refills: 0 | Status: SHIPPED | OUTPATIENT
Start: 2020-03-16 | End: 2020-03-26

## 2020-03-16 RX ORDER — VENLAFAXINE HYDROCHLORIDE 150 MG/1
150 CAPSULE, EXTENDED RELEASE ORAL DAILY
COMMUNITY
Start: 2020-03-01

## 2020-03-16 RX ORDER — DOXYCYCLINE HYCLATE 100 MG/1
100 CAPSULE ORAL EVERY 12 HOURS SCHEDULED
Qty: 20 CAPSULE | Refills: 0 | Status: SHIPPED | OUTPATIENT
Start: 2020-03-16 | End: 2020-03-26

## 2020-03-16 RX ORDER — VENLAFAXINE HYDROCHLORIDE 75 MG/1
75 CAPSULE, EXTENDED RELEASE ORAL DAILY
COMMUNITY
Start: 2020-03-01

## 2020-03-16 NOTE — TELEPHONE ENCOUNTER
These are preferred:    Doxycycline Hyclate 50 mg, 100 mg Capsule (generic Vibramycin Capsule)   Doxycycline Hyclate 20 mg Tablet (generic Periostat Tablet)   Doxycycline Hyclate 100 mg Tablet (generic Vibra-Tabs Tablet)   Doxycycline Monohydrate 50 mg, 100mg Capsule (generic Monodox Capsule)   Doxycycline Monohydrate Suspension (generic Vibramycin Suspension)

## 2020-03-16 NOTE — TELEPHONE ENCOUNTER
Patient called stating the antibiotic is not covered by insurance  It needs prior auth  Patient is requesting a different medication that would be covered

## 2020-03-16 NOTE — PATIENT INSTRUCTIONS
Abscess   WHAT YOU NEED TO KNOW:   A warm compress may help your abscess drain  Your healthcare provider may make a cut in the abscess so it can drain  You may need surgery to remove an abscess that is on your hands or buttocks  DISCHARGE INSTRUCTIONS:   Return to the emergency department if:   · The area around your abscess becomes very painful, warm, or has red streaks  · You have a fever and chills  · Your heart is beating faster than usual      · You feel faint or confused  Contact your healthcare provider if:   · Your abscess gets bigger or does not get better  · Your abscess returns  · You have questions or concerns about your condition or care  Medicines: You may  need any of the following:  · Antibiotics  help treat a bacterial infection  · Acetaminophen  decreases pain and fever  It is available without a doctor's order  Ask how much to take and how often to take it  Follow directions  Acetaminophen can cause liver damage if not taken correctly  · NSAIDs , such as ibuprofen, help decrease swelling, pain, and fever  This medicine is available with or without a doctor's order  NSAIDs can cause stomach bleeding or kidney problems in certain people  If you take blood thinner medicine, always ask your healthcare provider if NSAIDs are safe for you  Always read the medicine label and follow directions  · Take your medicine as directed  Contact your healthcare provider if you think your medicine is not helping or if you have side effects  Tell him or her if you are allergic to any medicine  Keep a list of the medicines, vitamins, and herbs you take  Include the amounts, and when and why you take them  Bring the list or the pill bottles to follow-up visits  Carry your medicine list with you in case of an emergency  Self-care:   · Apply a warm compress to your abscess  This will help it open and drain  Wet a washcloth in warm, but not hot, water  Apply the compress for 10 minutes  Repeat this 4 times each day  Do not  press on an abscess or try to open it with a needle  You may push the bacteria deeper or into your blood  · Do not share your clothes, towels, or sheets with anyone  This can spread the infection to others  · Wash your hands often  This can help prevent the spread of germs  Use soap and water or an alcohol-based hand rub  Care for your wound after it is drained:   · Care for your wound as directed  If your healthcare provider says it is okay, carefully remove the bandage and gauze packing  You may need to soak the gauze to get it out of your wound  Clean your wound and the area around it as directed  Dry the area and put on new, clean bandages  Change your bandages when they get wet or dirty  · Ask your healthcare provider how to change the gauze in your wound  Keep track of how many pieces of gauze are placed inside the wound  Do not put too much packing in the wound  Do not pack the gauze too tightly in your wound  Follow up with your healthcare provider in 1 to 3 days: You may need to have your packing removed or your bandage changed  Write down your questions so you remember to ask them during your visits  © 2017 [address] [employer]  Information is for End User's use only and may not be sold, redistributed or otherwise used for commercial purposes  All illustrations and images included in CareNotes® are the copyrighted property of [email or web address] , [employer]  or Matt Broderick  The above information is an  only  It is not intended as medical advice for individual conditions or treatments  Talk to your doctor, nurse or pharmacist before following any medical regimen to see if it is safe and effective for you

## 2020-03-16 NOTE — PROGRESS NOTES
Post procedure call completed on 3/16/20 @ 1220    Bleeding: _____yes __x___no    Pain: _____yes ___x___no    Redness/Swelling: ______yes __x____no    Band aid removed: __x___yes _____no    Steri-Strips intact: ___x___yes _____no

## 2020-03-16 NOTE — TELEPHONE ENCOUNTER
The medication I prescribed is the preferred antibiotic at least until the cultures come back, can you ask her if she has the good rx penny? If not she should download it, cost is approximately $9-$15 with penny  I can send to whichever pharmacy penny recommends, thanks!

## 2020-03-16 NOTE — PROGRESS NOTES
Assessment/Plan:    Abscess of breast  Tolerated procedure well with no complications  Doxycyline 100mg BID for 10 days  Follow up with me in 2 days for repack of wound    - Wound culture pending   - NSAID prn for pain       Diagnoses and all orders for this visit:    Abscess of breast  -     Wound culture and Gram stain  -     doxycycline (DORYX) 100 MG EC tablet; Take 1 tablet (100 mg total) by mouth 2 (two) times a day for 10 days  -     Incision and drain    Other orders  -     venlafaxine (EFFEXOR-XR) 150 mg 24 hr capsule; Take 150 mg by mouth daily  -     venlafaxine (EFFEXOR-XR) 75 mg 24 hr capsule; Take 75 mg by mouth daily            Incision and drain  Date/Time: 3/16/2020 1:35 PM  Performed by: Connie Belle MD  Authorized by: Connie Belle MD     Patient location:  Clinic  Other Assisting Provider: Yes (comment)    Consent:     Consent obtained:  Verbal    Consent given by:  Patient    Risks discussed:  Infection, incomplete drainage and pain    Alternatives discussed:  Observation  Universal protocol:     Procedure explained and questions answered to patient or proxy's satisfaction: yes      Relevant documents present and verified: yes      Test results available and properly labeled: yes      Radiology Images displayed and confirmed  If images not available, report reviewed: yes      Required blood products, implants, devices, and special equipment available: yes      Site/side marked: yes      Immediately prior to procedure a time out was called: yes      Patient identity confirmed:  Verbally with patient and provided demographic data  Location:     Type:  Abscess    Size:  1 5''    Location: R breast   Pre-procedure details:     Skin preparation:  Betadine  Anesthesia (see MAR for exact dosages):      Anesthesia method:  Topical application and local infiltration    Local anesthetic:  Lidocaine 2% w/o epi  Procedure details:     Complexity:  Simple    Needle aspiration: no      Incision types:  Stab incision and elliptical    Scalpel blade:  15    Approach:  Puncture    Incision depth:  Subcutaneous    Wound management:  Probed and deloculated    Drainage:  Bloody and serosanguinous    Drainage amount:  Scant    Wound treatment:  Packing placed    Packing materials:  1/4 in iodoform gauze  Post-procedure details:     Patient tolerance of procedure: Tolerated well, no immediate complications        Subjective:      Patient ID: Norma Vazquez is a 44 y o  female  Subjective:     Norma Vazquez is a 44 y o  female who presents for evaluation of a probable cutaneous abscess  Lesion is located in the r breast 10 position  Onset was 5 days ago  Symptoms have waxed and waned but are worse overall  Abscess has associated symptoms of spontaneous drainage, pain  Patient does not have previous history of cutaneous abscesses  Patient does not have diabetes  44year old female presents to clinic for sick visit, complaining of "pimple" of right breast  Has been present for 5 days  States it came to a head today and noted some purulent drainage  Denies any recent trauma, fevers, chills or recent travel  The following portions of the patient's history were reviewed and updated as appropriate: allergies, current medications, past family history, past medical history, past social history, past surgical history and problem list     Review of Systems   Constitutional: Negative for activity change, appetite change, fatigue, fever and unexpected weight change  HENT: Negative for sneezing, sore throat and tinnitus  Eyes: Negative for pain  Respiratory: Negative for cough, choking and chest tightness  Cardiovascular: Negative for chest pain, palpitations and leg swelling  Gastrointestinal: Negative for abdominal distention, abdominal pain, constipation, diarrhea, nausea and vomiting  Endocrine: Negative for polydipsia, polyphagia and polyuria  Genitourinary: Negative for dysuria and flank pain  Musculoskeletal: Negative for back pain  Neurological: Negative for dizziness, seizures, syncope, speech difficulty, light-headedness and numbness  Psychiatric/Behavioral: Negative for agitation  Objective:      /98 (BP Location: Left arm, Patient Position: Sitting, Cuff Size: Large)   Pulse 104   Temp 97 9 °F (36 6 °C) (Tympanic)   Resp 16   Ht 5' 2 5" (1 588 m)   Wt 105 kg (232 lb 6 4 oz)   SpO2 94%   Breastfeeding No Comment: LMP in 2008  BMI 41 83 kg/m²          Physical Exam   Constitutional: She is oriented to person, place, and time  No distress  HENT:   Right Ear: External ear normal    Left Ear: External ear normal    Eyes: Conjunctivae and EOM are normal    Cardiovascular: Normal rate, regular rhythm and normal heart sounds  Pulmonary/Chest: Effort normal  She has no wheezes  Musculoskeletal: Normal range of motion  Neurological: She is alert and oriented to person, place, and time  Skin: She is not diaphoretic  R breast abscess noted with area of fluctuance and streaking  Tender to moderate palpation  S/P I&D: tolerated procedure well, with no complications  Mild erythema noted post incision   Psychiatric: She has a normal mood and affect

## 2020-03-16 NOTE — TELEPHONE ENCOUNTER
Crossing of messages, provider has changed medication to preferred drug, attempted to call patient but she did not answer and VM was not set up

## 2020-03-16 NOTE — ASSESSMENT & PLAN NOTE
Tolerated procedure well with no complications    Doxycyline 100mg BID for 10 days  Follow up with me in 2 days for repack of wound    - Wound culture pending   - NSAID prn for pain

## 2020-03-17 NOTE — TELEPHONE ENCOUNTER
The script was changed yesterday, I couldn't reach her  See my note below   This is what prompted my lync yesterday about not routing to both provider and clinical pool

## 2020-03-17 NOTE — TELEPHONE ENCOUNTER
Patient called back and stated she has no access to that penny and doesn't know how that works anyway  She stated she has no money to pay for medications if they are expensive  She will try to answer the phone if someone can call her back with a new prescription

## 2020-03-17 NOTE — TELEPHONE ENCOUNTER
Called patient and was able to speak to patient she will call pharmacy to check on new prescription  She stated thank you

## 2020-03-18 ENCOUNTER — HOSPITAL ENCOUNTER (OUTPATIENT)
Dept: RADIOLOGY | Facility: HOSPITAL | Age: 50
Discharge: HOME | End: 2020-03-18
Payer: COMMERCIAL

## 2020-03-18 ENCOUNTER — OFFICE VISIT (OUTPATIENT)
Dept: FAMILY MEDICINE CLINIC | Facility: CLINIC | Age: 50
End: 2020-03-18

## 2020-03-18 ENCOUNTER — TRANSCRIBE ORDERS (OUTPATIENT)
Dept: ADMINISTRATIVE | Facility: HOSPITAL | Age: 50
End: 2020-03-18

## 2020-03-18 ENCOUNTER — TELEPHONE (OUTPATIENT)
Dept: MAMMOGRAPHY | Facility: CLINIC | Age: 50
End: 2020-03-18

## 2020-03-18 VITALS
HEART RATE: 89 BPM | SYSTOLIC BLOOD PRESSURE: 132 MMHG | TEMPERATURE: 97.3 F | DIASTOLIC BLOOD PRESSURE: 90 MMHG | RESPIRATION RATE: 14 BRPM | WEIGHT: 236 LBS | OXYGEN SATURATION: 98 % | BODY MASS INDEX: 42.48 KG/M2

## 2020-03-18 DIAGNOSIS — G89.29 CHRONIC PAIN OF BOTH KNEES: ICD-10-CM

## 2020-03-18 DIAGNOSIS — M25.562 CHRONIC PAIN OF BOTH KNEES: Primary | ICD-10-CM

## 2020-03-18 DIAGNOSIS — N61.1 ABSCESS OF BREAST: ICD-10-CM

## 2020-03-18 DIAGNOSIS — M25.561 CHRONIC PAIN OF BOTH KNEES: Primary | ICD-10-CM

## 2020-03-18 DIAGNOSIS — M25.561 CHRONIC PAIN OF BOTH KNEES: ICD-10-CM

## 2020-03-18 DIAGNOSIS — M25.562 CHRONIC PAIN OF BOTH KNEES: ICD-10-CM

## 2020-03-18 DIAGNOSIS — G89.29 CHRONIC PAIN OF BOTH KNEES: Primary | ICD-10-CM

## 2020-03-18 PROCEDURE — 3080F DIAST BP >= 90 MM HG: CPT | Performed by: FAMILY MEDICINE

## 2020-03-18 PROCEDURE — 99213 OFFICE O/P EST LOW 20 MIN: CPT | Performed by: FAMILY MEDICINE

## 2020-03-18 PROCEDURE — 3075F SYST BP GE 130 - 139MM HG: CPT | Performed by: FAMILY MEDICINE

## 2020-03-18 PROCEDURE — 73562 X-RAY EXAM OF KNEE 3: CPT

## 2020-03-18 NOTE — PROGRESS NOTES
Assessment/Plan:    Abscess of breast  Wound healing well, reviewed culture results with patient in addition to mammogram/ultrasound findings  Dressing changed  Continue with doxycycline  Follow-up with me on Friday      Chronic pain of both knees  Questionable history of prior menisceal tear, encouraged to bring records on follow-up  Radiographs ordered  Encouraged use of NSAIDs  Will discuss results on Friday       Diagnoses and all orders for this visit:    Chronic pain of both knees  -     XR knee 3 vw left non injury; Future  -     XR knee 3 vw right non injury; Future    Abscess of breast          Subjective:      Patient ID: Ming Romero is a 44 y o  female  HPI    57-year-old female presents to clinic for follow-up wound  States she has been doing home dressing changes has not  Noticed any malodor feels redness has improved denies any fever or chills  Reports compliance with antibiotics has no side effects  Additionally complaining of bilateral knee pain states right is worse than left states she had an MRI approximately 10 years ago and was informed by an orthopedist she had a miniscule tear and was recommended to have a knee replacement of which was rejected secondary to age she is interested in having this evaluated  She denies having any recent falls or trauma to area  Has occasional locking and popping right more so than left and has difficulty climbing up stairs  The following portions of the patient's history were reviewed and updated as appropriate: allergies, current medications, past family history, past medical history, past social history, past surgical history and problem list     Review of Systems   Constitutional: Negative for activity change, appetite change, fatigue, fever and unexpected weight change  HENT: Negative for sneezing, sore throat and tinnitus  Eyes: Negative for pain  Respiratory: Negative for cough, choking and chest tightness      Cardiovascular: Negative for chest pain, palpitations and leg swelling  Gastrointestinal: Negative for abdominal distention, abdominal pain, constipation, diarrhea, nausea and vomiting  Endocrine: Negative for polydipsia, polyphagia and polyuria  Genitourinary: Negative for dysuria and flank pain  Musculoskeletal: Positive for back pain and joint swelling  Neurological: Negative for dizziness, seizures, syncope, speech difficulty, light-headedness and numbness  Psychiatric/Behavioral: Negative for agitation  Objective:      /90 (BP Location: Right arm, Patient Position: Sitting, Cuff Size: Adult)   Pulse 89   Temp (!) 97 3 °F (36 3 °C) (Temporal)   Resp 14   Wt 107 kg (236 lb)   SpO2 98%   BMI 42 48 kg/m²          Physical Exam   Constitutional: She is oriented to person, place, and time  No distress  HENT:   Head: Normocephalic and atraumatic  Right Ear: External ear normal    Left Ear: External ear normal    Eyes: Conjunctivae and EOM are normal    Neck: Neck supple  No tracheal deviation present  No thyromegaly present  Cardiovascular: Normal rate, regular rhythm and normal heart sounds  No murmur heard  Pulmonary/Chest: Effort normal and breath sounds normal  No respiratory distress  She has no wheezes  Abdominal: Soft  Bowel sounds are normal  She exhibits no distension  There is no tenderness  There is no guarding  Musculoskeletal: Normal range of motion  She exhibits no edema  Right knee edema greater than left   ACL PCL intact   mild MCL laxity bilaterally right worse than left   no locking per my exam  Crepitus noted bilaterally   Neurological: She is alert and oriented to person, place, and time  Skin: Skin is warm  No rash noted  She is not diaphoretic  Erythema of right breast much improved   I&D packaging removed mild purulence noted no odor     Psychiatric: She has a normal mood and affect

## 2020-03-18 NOTE — ASSESSMENT & PLAN NOTE
Questionable history of prior menisceal tear, encouraged to bring records on follow-up  Radiographs ordered  Encouraged use of NSAIDs  Will discuss results on Friday

## 2020-03-18 NOTE — ASSESSMENT & PLAN NOTE
Wound healing well, reviewed culture results with patient in addition to mammogram/ultrasound findings      Dressing changed  Continue with doxycycline  Follow-up with me on Friday

## 2020-03-19 LAB
BACTERIA WND AEROBE CULT: NORMAL
GRAM STN SPEC: NORMAL

## 2020-03-20 ENCOUNTER — TELEPHONE (OUTPATIENT)
Dept: FAMILY MEDICINE CLINIC | Facility: CLINIC | Age: 50
End: 2020-03-20

## 2020-03-25 ENCOUNTER — APPOINTMENT (OUTPATIENT)
Dept: LAB | Facility: HOSPITAL | Age: 50
End: 2020-03-25
Payer: COMMERCIAL

## 2020-03-25 DIAGNOSIS — D50.9 IRON DEFICIENCY ANEMIA, UNSPECIFIED IRON DEFICIENCY ANEMIA TYPE: ICD-10-CM

## 2020-03-25 DIAGNOSIS — G89.29 CHRONIC PAIN OF BOTH KNEES: Primary | ICD-10-CM

## 2020-03-25 DIAGNOSIS — M25.561 CHRONIC PAIN OF BOTH KNEES: Primary | ICD-10-CM

## 2020-03-25 DIAGNOSIS — M25.562 CHRONIC PAIN OF BOTH KNEES: Primary | ICD-10-CM

## 2020-03-25 LAB
ALBUMIN SERPL BCP-MCNC: 3.2 G/DL (ref 3.5–5)
ALP SERPL-CCNC: 130 U/L (ref 46–116)
ALT SERPL W P-5'-P-CCNC: 43 U/L (ref 12–78)
ANION GAP SERPL CALCULATED.3IONS-SCNC: 3 MMOL/L (ref 4–13)
AST SERPL W P-5'-P-CCNC: 33 U/L (ref 5–45)
BASOPHILS # BLD AUTO: 0.09 THOUSANDS/ΜL (ref 0–0.1)
BASOPHILS NFR BLD AUTO: 1 % (ref 0–1)
BILIRUB SERPL-MCNC: 0.26 MG/DL (ref 0.2–1)
BUN SERPL-MCNC: 17 MG/DL (ref 5–25)
CALCIUM SERPL-MCNC: 9 MG/DL (ref 8.3–10.1)
CHLORIDE SERPL-SCNC: 108 MMOL/L (ref 100–108)
CO2 SERPL-SCNC: 29 MMOL/L (ref 21–32)
CREAT SERPL-MCNC: 0.84 MG/DL (ref 0.6–1.3)
EOSINOPHIL # BLD AUTO: 0.29 THOUSAND/ΜL (ref 0–0.61)
EOSINOPHIL NFR BLD AUTO: 5 % (ref 0–6)
ERYTHROCYTE [DISTWIDTH] IN BLOOD BY AUTOMATED COUNT: 20 % (ref 11.6–15.1)
FERRITIN SERPL-MCNC: 155 NG/ML (ref 8–388)
GFR SERPL CREATININE-BSD FRML MDRD: 81 ML/MIN/1.73SQ M
GLUCOSE SERPL-MCNC: 115 MG/DL (ref 65–140)
HCT VFR BLD AUTO: 47.4 % (ref 34.8–46.1)
HGB BLD-MCNC: 14.4 G/DL (ref 11.5–15.4)
IMM GRANULOCYTES # BLD AUTO: 0.02 THOUSAND/UL (ref 0–0.2)
IMM GRANULOCYTES NFR BLD AUTO: 0 % (ref 0–2)
IRON SATN MFR SERPL: 27 %
IRON SERPL-MCNC: 86 UG/DL (ref 50–170)
LYMPHOCYTES # BLD AUTO: 1.69 THOUSANDS/ΜL (ref 0.6–4.47)
LYMPHOCYTES NFR BLD AUTO: 27 % (ref 14–44)
MCH RBC QN AUTO: 27.3 PG (ref 26.8–34.3)
MCHC RBC AUTO-ENTMCNC: 30.4 G/DL (ref 31.4–37.4)
MCV RBC AUTO: 90 FL (ref 82–98)
MONOCYTES # BLD AUTO: 0.55 THOUSAND/ΜL (ref 0.17–1.22)
MONOCYTES NFR BLD AUTO: 9 % (ref 4–12)
NEUTROPHILS # BLD AUTO: 3.64 THOUSANDS/ΜL (ref 1.85–7.62)
NEUTS SEG NFR BLD AUTO: 58 % (ref 43–75)
NRBC BLD AUTO-RTO: 0 /100 WBCS
PLATELET # BLD AUTO: 260 THOUSANDS/UL (ref 149–390)
PMV BLD AUTO: 9.6 FL (ref 8.9–12.7)
POTASSIUM SERPL-SCNC: 4.2 MMOL/L (ref 3.5–5.3)
PROT SERPL-MCNC: 6.7 G/DL (ref 6.4–8.2)
RBC # BLD AUTO: 5.27 MILLION/UL (ref 3.81–5.12)
SODIUM SERPL-SCNC: 140 MMOL/L (ref 136–145)
TIBC SERPL-MCNC: 315 UG/DL (ref 250–450)
WBC # BLD AUTO: 6.28 THOUSAND/UL (ref 4.31–10.16)

## 2020-03-25 PROCEDURE — 83550 IRON BINDING TEST: CPT

## 2020-03-25 PROCEDURE — 80053 COMPREHEN METABOLIC PANEL: CPT

## 2020-03-25 PROCEDURE — 83540 ASSAY OF IRON: CPT

## 2020-03-25 PROCEDURE — 83918 ORGANIC ACIDS TOTAL QUANT: CPT

## 2020-03-25 PROCEDURE — 82728 ASSAY OF FERRITIN: CPT

## 2020-03-25 PROCEDURE — 85025 COMPLETE CBC W/AUTO DIFF WBC: CPT

## 2020-03-25 PROCEDURE — 36415 COLL VENOUS BLD VENIPUNCTURE: CPT

## 2020-03-27 ENCOUNTER — TELEMEDICINE (OUTPATIENT)
Dept: FAMILY MEDICINE CLINIC | Facility: CLINIC | Age: 50
End: 2020-03-27

## 2020-03-27 DIAGNOSIS — M25.562 CHRONIC PAIN OF BOTH KNEES: Primary | ICD-10-CM

## 2020-03-27 DIAGNOSIS — G89.29 CHRONIC PAIN OF BOTH KNEES: Primary | ICD-10-CM

## 2020-03-27 DIAGNOSIS — N61.1 ABSCESS OF BREAST: ICD-10-CM

## 2020-03-27 DIAGNOSIS — M25.561 CHRONIC PAIN OF BOTH KNEES: Primary | ICD-10-CM

## 2020-03-27 LAB
METHYLMALONATE SERPL-SCNC: 374 NMOL/L (ref 0–378)
SL AMB DISCLAIMER: NORMAL

## 2020-03-27 PROCEDURE — G2012 BRIEF CHECK IN BY MD/QHP: HCPCS | Performed by: FAMILY MEDICINE

## 2020-03-27 NOTE — PROGRESS NOTES
Virtual Regular Visit    Problem List Items Addressed This Visit     None               Reason for visit is  Follow-up breast abscess, knee pain    Encounter provider Artemio Trujillo MD    Provider located at [facility] [address] [address] [phone]      Recent Visits  Date Type Provider Dept   03/20/20 Telephone Artemio Trujillo MD [facility] [address] recent visits within past 7 days and meeting all other requirements     Future Appointments  No visits were found meeting these conditions  Showing future appointments within next 150 days and meeting all other requirements        After connecting through [employer], the patient was identified by name and date of birth  Milton Chambers was informed that this is a telemedicine visit and that the visit is being conducted through telephone which may not be secure and therefore, might not be HIPAA-compliant  My office door was closed  No one else was in the room  She acknowledged consent and understanding of privacy and security of the video platform  The patient has agreed to participate and understands they can discontinue the visit at any time  Subjective  Milton Chambers is a 44 y o  female  Presents for follow-up breast abscess and knee pain  States breast abscess is well healed erythema has resolved denies having any fever or any drainage from site  Has completed course of doxycycline, denies any diarrhea      knee pain is unchanged from prior examination radiographic findings were reviewed with patient is interested in following up with orthopedist for possible intervention  All questions and concerns were addressed states she is in no need of refills at this time        Past Medical History:   Diagnosis Date    Anxiety     Asthma     Hypertension     Migraine     Palpitations     Psychiatric disorder        Past Surgical History:   Procedure Laterality Date    BREAST BIOPSY Right 2020     SECTION      CHOLECYSTECTOMY      FEMUR FRACTURE SURGERY      FRACTURE SURGERY      Treatment of fractures of both radial and ulnar shafts     FRACTURE SURGERY      Treatment pf tibial shaft fracture with plate/screws    GASTRIC BYPASS      HERNIA REPAIR      HYSTERECTOMY      SHOULDER SURGERY Right     US GUIDED BREAST BIOPSY RIGHT COMPLETE Right 3/13/2020       Current Outpatient Medications   Medication Sig Dispense Refill    albuterol (PROVENTIL HFA,VENTOLIN HFA) 90 mcg/act inhaler Inhale 2 puffs every 6 (six) hours as needed for wheezing or shortness of breath 1 Inhaler 0    benzonatate (TESSALON PERLES) 100 mg capsule Take 1 capsule (100 mg total) by mouth 3 (three) times a day as needed for cough (Patient not taking: Reported on 2020) 30 capsule 0    bisacodyl (DULCOLAX) 5 mg EC tablet Take 2 tablets (10 mg total) by mouth once for 1 dose 2 tablet 0    ferrous sulfate 325 (65 Fe) mg tablet Take 1 tablet (325 mg total) by mouth daily with breakfast 30 tablet 0    guaiFENesin (ROBITUSSIN) 100 MG/5ML oral liquid Take 10 mL (200 mg total) by mouth 3 (three) times a day as needed for cough (Patient not taking: Reported on 2020) 120 mL 0    LORazepam (ATIVAN) 0 5 mg tablet Take 0 5 mg by mouth daily as needed  2    polyethylene glycol (GOLYTELY) 4000 mL solution Take 4,000 mL by mouth once for 1 dose 4000 mL 0    QUEtiapine (SEROquel) 100 mg tablet Take 100 mg by mouth daily at bedtime      venlafaxine (EFFEXOR) 50 mg tablet Take 225 mg by mouth daily       venlafaxine (EFFEXOR-XR) 150 mg 24 hr capsule Take 150 mg by mouth daily      venlafaxine (EFFEXOR-XR) 75 mg 24 hr capsule Take 75 mg by mouth daily       No current facility-administered medications for this visit           Allergies   Allergen Reactions    Codeine     Latex     Morphine     Morphine And Related     Sulfa Antibiotics        Review of Systems   Constitutional: Negative for activity change, appetite change, fatigue, fever and unexpected weight change  HENT: Negative for sneezing, sore throat and tinnitus  Eyes: Negative for pain  Respiratory: Negative for cough, choking and chest tightness  Cardiovascular: Negative for chest pain, palpitations and leg swelling  Gastrointestinal: Negative for abdominal distention, abdominal pain, constipation, diarrhea, nausea and vomiting  Endocrine: Negative for polydipsia, polyphagia and polyuria  Genitourinary: Negative for dysuria and flank pain  Musculoskeletal: Positive for back pain  Neurological: Negative for dizziness, seizures, syncope, speech difficulty, light-headedness and numbness  Psychiatric/Behavioral: Negative for agitation  breast abscess:  resolved  reviewed red flag symptoms with patient instructed to contact office or go to ED if symptoms recur or worsen  Verbalized understanding    knee pain  radiographic findings reviewed with patient  orthopedic referral given, appreciate their input      I spent 16 minutes with the patient during this visit

## 2020-03-30 ENCOUNTER — TELEPHONE (OUTPATIENT)
Dept: GASTROENTEROLOGY | Facility: CLINIC | Age: 50
End: 2020-03-30

## 2020-04-07 ENCOUNTER — TELEPHONE (OUTPATIENT)
Dept: SURGICAL ONCOLOGY | Facility: CLINIC | Age: 50
End: 2020-04-07

## 2020-04-07 ENCOUNTER — TELEPHONE (OUTPATIENT)
Dept: HEMATOLOGY ONCOLOGY | Facility: CLINIC | Age: 50
End: 2020-04-07

## 2020-04-08 ENCOUNTER — OFFICE VISIT (OUTPATIENT)
Dept: HEMATOLOGY ONCOLOGY | Facility: HOSPITAL | Age: 50
End: 2020-04-08
Payer: COMMERCIAL

## 2020-04-08 VITALS
TEMPERATURE: 98.8 F | RESPIRATION RATE: 16 BRPM | SYSTOLIC BLOOD PRESSURE: 126 MMHG | OXYGEN SATURATION: 99 % | DIASTOLIC BLOOD PRESSURE: 82 MMHG | HEART RATE: 97 BPM | WEIGHT: 262.4 LBS | BODY MASS INDEX: 48.29 KG/M2 | HEIGHT: 62 IN

## 2020-04-08 DIAGNOSIS — D50.9 IRON DEFICIENCY ANEMIA, UNSPECIFIED IRON DEFICIENCY ANEMIA TYPE: Primary | ICD-10-CM

## 2020-04-08 PROCEDURE — 3079F DIAST BP 80-89 MM HG: CPT | Performed by: INTERNAL MEDICINE

## 2020-04-08 PROCEDURE — 3074F SYST BP LT 130 MM HG: CPT | Performed by: INTERNAL MEDICINE

## 2020-04-08 PROCEDURE — 3008F BODY MASS INDEX DOCD: CPT | Performed by: INTERNAL MEDICINE

## 2020-04-08 PROCEDURE — 99214 OFFICE O/P EST MOD 30 MIN: CPT | Performed by: INTERNAL MEDICINE

## 2020-04-09 ENCOUNTER — TELEMEDICINE (OUTPATIENT)
Dept: FAMILY MEDICINE CLINIC | Facility: CLINIC | Age: 50
End: 2020-04-09

## 2020-04-09 ENCOUNTER — TELEPHONE (OUTPATIENT)
Dept: FAMILY MEDICINE CLINIC | Facility: CLINIC | Age: 50
End: 2020-04-09

## 2020-04-09 DIAGNOSIS — M17.0 PRIMARY OSTEOARTHRITIS OF BOTH KNEES: Primary | ICD-10-CM

## 2020-04-09 PROCEDURE — G2012 BRIEF CHECK IN BY MD/QHP: HCPCS | Performed by: FAMILY MEDICINE

## 2020-04-13 ENCOUNTER — PROCEDURE VISIT (OUTPATIENT)
Dept: FAMILY MEDICINE CLINIC | Facility: CLINIC | Age: 50
End: 2020-04-13

## 2020-04-13 VITALS
BODY MASS INDEX: 45.27 KG/M2 | SYSTOLIC BLOOD PRESSURE: 126 MMHG | RESPIRATION RATE: 16 BRPM | WEIGHT: 246 LBS | DIASTOLIC BLOOD PRESSURE: 86 MMHG | HEART RATE: 95 BPM | TEMPERATURE: 96.7 F | OXYGEN SATURATION: 99 % | HEIGHT: 62 IN

## 2020-04-13 DIAGNOSIS — G89.29 CHRONIC PAIN OF BOTH KNEES: Primary | ICD-10-CM

## 2020-04-13 DIAGNOSIS — M25.562 CHRONIC PAIN OF BOTH KNEES: Primary | ICD-10-CM

## 2020-04-13 DIAGNOSIS — M25.561 CHRONIC PAIN OF BOTH KNEES: Primary | ICD-10-CM

## 2020-04-13 PROCEDURE — 3008F BODY MASS INDEX DOCD: CPT | Performed by: FAMILY MEDICINE

## 2020-04-13 PROCEDURE — 3079F DIAST BP 80-89 MM HG: CPT | Performed by: FAMILY MEDICINE

## 2020-04-13 PROCEDURE — 3074F SYST BP LT 130 MM HG: CPT | Performed by: FAMILY MEDICINE

## 2020-04-13 PROCEDURE — 20610 DRAIN/INJ JOINT/BURSA W/O US: CPT | Performed by: FAMILY MEDICINE

## 2020-04-13 RX ORDER — TRIAMCINOLONE ACETONIDE 40 MG/ML
40 INJECTION, SUSPENSION INTRA-ARTICULAR; INTRAMUSCULAR
Status: COMPLETED | OUTPATIENT
Start: 2020-04-13 | End: 2020-04-13

## 2020-04-13 RX ORDER — LIDOCAINE HYDROCHLORIDE 20 MG/ML
2 INJECTION, SOLUTION EPIDURAL; INFILTRATION; INTRACAUDAL; PERINEURAL
Status: COMPLETED | OUTPATIENT
Start: 2020-04-13 | End: 2020-04-13

## 2020-04-13 RX ADMIN — LIDOCAINE HYDROCHLORIDE 2 ML: 20 INJECTION, SOLUTION EPIDURAL; INFILTRATION; INTRACAUDAL; PERINEURAL at 12:06

## 2020-04-13 RX ADMIN — TRIAMCINOLONE ACETONIDE 40 MG: 40 INJECTION, SUSPENSION INTRA-ARTICULAR; INTRAMUSCULAR at 12:06

## 2020-04-13 RX ADMIN — TRIAMCINOLONE ACETONIDE 40 MG: 40 INJECTION, SUSPENSION INTRA-ARTICULAR; INTRAMUSCULAR at 11:59

## 2020-04-13 RX ADMIN — LIDOCAINE HYDROCHLORIDE 2 ML: 20 INJECTION, SOLUTION EPIDURAL; INFILTRATION; INTRACAUDAL; PERINEURAL at 11:59

## 2020-04-15 ENCOUNTER — EVALUATION (OUTPATIENT)
Dept: PHYSICAL THERAPY | Facility: CLINIC | Age: 50
End: 2020-04-15
Payer: COMMERCIAL

## 2020-04-15 DIAGNOSIS — M25.562 CHRONIC PAIN OF BOTH KNEES: Primary | ICD-10-CM

## 2020-04-15 DIAGNOSIS — M25.561 CHRONIC PAIN OF BOTH KNEES: Primary | ICD-10-CM

## 2020-04-15 DIAGNOSIS — G89.29 CHRONIC PAIN OF BOTH KNEES: Primary | ICD-10-CM

## 2020-04-15 PROCEDURE — 97161 PT EVAL LOW COMPLEX 20 MIN: CPT | Performed by: PHYSICAL THERAPIST

## 2020-04-20 ENCOUNTER — TELEMEDICINE (OUTPATIENT)
Dept: FAMILY MEDICINE CLINIC | Facility: CLINIC | Age: 50
End: 2020-04-20

## 2020-04-20 ENCOUNTER — OFFICE VISIT (OUTPATIENT)
Dept: PHYSICAL THERAPY | Facility: CLINIC | Age: 50
End: 2020-04-20
Payer: COMMERCIAL

## 2020-04-20 DIAGNOSIS — M25.561 CHRONIC PAIN OF BOTH KNEES: Primary | ICD-10-CM

## 2020-04-20 DIAGNOSIS — G89.29 CHRONIC PAIN OF BOTH KNEES: Primary | ICD-10-CM

## 2020-04-20 DIAGNOSIS — M25.562 CHRONIC PAIN OF BOTH KNEES: Primary | ICD-10-CM

## 2020-04-20 PROBLEM — Z12.11 ENCOUNTER FOR SCREENING COLONOSCOPY: Status: RESOLVED | Noted: 2019-11-20 | Resolved: 2020-04-20

## 2020-04-20 PROBLEM — N61.1 ABSCESS OF BREAST: Status: RESOLVED | Noted: 2020-03-16 | Resolved: 2020-04-20

## 2020-04-20 PROCEDURE — 97110 THERAPEUTIC EXERCISES: CPT | Performed by: PHYSICAL THERAPIST

## 2020-04-20 PROCEDURE — G2012 BRIEF CHECK IN BY MD/QHP: HCPCS | Performed by: FAMILY MEDICINE

## 2020-04-20 PROCEDURE — 97140 MANUAL THERAPY 1/> REGIONS: CPT | Performed by: PHYSICAL THERAPIST

## 2020-04-23 ENCOUNTER — OFFICE VISIT (OUTPATIENT)
Dept: PHYSICAL THERAPY | Facility: CLINIC | Age: 50
End: 2020-04-23
Payer: COMMERCIAL

## 2020-04-23 DIAGNOSIS — G89.29 CHRONIC PAIN OF BOTH KNEES: Primary | ICD-10-CM

## 2020-04-23 DIAGNOSIS — M25.562 CHRONIC PAIN OF BOTH KNEES: Primary | ICD-10-CM

## 2020-04-23 DIAGNOSIS — M25.561 CHRONIC PAIN OF BOTH KNEES: Primary | ICD-10-CM

## 2020-04-23 PROCEDURE — 97140 MANUAL THERAPY 1/> REGIONS: CPT

## 2020-04-23 PROCEDURE — 97110 THERAPEUTIC EXERCISES: CPT

## 2020-04-30 ENCOUNTER — OFFICE VISIT (OUTPATIENT)
Dept: PHYSICAL THERAPY | Facility: CLINIC | Age: 50
End: 2020-04-30
Payer: COMMERCIAL

## 2020-04-30 DIAGNOSIS — M25.561 CHRONIC PAIN OF BOTH KNEES: Primary | ICD-10-CM

## 2020-04-30 DIAGNOSIS — G89.29 CHRONIC PAIN OF BOTH KNEES: Primary | ICD-10-CM

## 2020-04-30 DIAGNOSIS — M25.562 CHRONIC PAIN OF BOTH KNEES: Primary | ICD-10-CM

## 2020-04-30 PROCEDURE — 97140 MANUAL THERAPY 1/> REGIONS: CPT

## 2020-04-30 PROCEDURE — 97110 THERAPEUTIC EXERCISES: CPT

## 2020-05-04 ENCOUNTER — OFFICE VISIT (OUTPATIENT)
Dept: PHYSICAL THERAPY | Facility: CLINIC | Age: 50
End: 2020-05-04
Payer: COMMERCIAL

## 2020-05-04 DIAGNOSIS — M25.562 CHRONIC PAIN OF BOTH KNEES: Primary | ICD-10-CM

## 2020-05-04 DIAGNOSIS — G89.29 CHRONIC PAIN OF BOTH KNEES: Primary | ICD-10-CM

## 2020-05-04 DIAGNOSIS — M25.561 CHRONIC PAIN OF BOTH KNEES: Primary | ICD-10-CM

## 2020-05-04 PROCEDURE — 97140 MANUAL THERAPY 1/> REGIONS: CPT

## 2020-05-04 PROCEDURE — 97110 THERAPEUTIC EXERCISES: CPT

## 2020-05-06 ENCOUNTER — TELEMEDICINE (OUTPATIENT)
Dept: FAMILY MEDICINE CLINIC | Facility: CLINIC | Age: 50
End: 2020-05-06

## 2020-05-06 DIAGNOSIS — E66.9 OBESITY (BMI 35.0-39.9 WITHOUT COMORBIDITY): ICD-10-CM

## 2020-05-06 DIAGNOSIS — M25.562 CHRONIC PAIN OF BOTH KNEES: Primary | ICD-10-CM

## 2020-05-06 DIAGNOSIS — M25.561 CHRONIC PAIN OF BOTH KNEES: Primary | ICD-10-CM

## 2020-05-06 DIAGNOSIS — H00.014 HORDEOLUM EXTERNUM OF LEFT UPPER EYELID: ICD-10-CM

## 2020-05-06 DIAGNOSIS — G89.29 CHRONIC PAIN OF BOTH KNEES: Primary | ICD-10-CM

## 2020-05-06 PROCEDURE — 99214 OFFICE O/P EST MOD 30 MIN: CPT | Performed by: FAMILY MEDICINE

## 2020-05-07 ENCOUNTER — OFFICE VISIT (OUTPATIENT)
Dept: PHYSICAL THERAPY | Facility: CLINIC | Age: 50
End: 2020-05-07
Payer: COMMERCIAL

## 2020-05-07 DIAGNOSIS — G89.29 CHRONIC PAIN OF BOTH KNEES: Primary | ICD-10-CM

## 2020-05-07 DIAGNOSIS — M25.561 CHRONIC PAIN OF BOTH KNEES: Primary | ICD-10-CM

## 2020-05-07 DIAGNOSIS — M25.562 CHRONIC PAIN OF BOTH KNEES: Primary | ICD-10-CM

## 2020-05-07 PROCEDURE — 97110 THERAPEUTIC EXERCISES: CPT

## 2020-05-11 ENCOUNTER — OFFICE VISIT (OUTPATIENT)
Dept: PHYSICAL THERAPY | Facility: CLINIC | Age: 50
End: 2020-05-11
Payer: COMMERCIAL

## 2020-05-11 DIAGNOSIS — M25.561 CHRONIC PAIN OF BOTH KNEES: Primary | ICD-10-CM

## 2020-05-11 DIAGNOSIS — G89.29 CHRONIC PAIN OF BOTH KNEES: Primary | ICD-10-CM

## 2020-05-11 DIAGNOSIS — M25.562 CHRONIC PAIN OF BOTH KNEES: Primary | ICD-10-CM

## 2020-05-11 PROCEDURE — 97140 MANUAL THERAPY 1/> REGIONS: CPT | Performed by: PHYSICAL THERAPIST

## 2020-05-11 PROCEDURE — 97110 THERAPEUTIC EXERCISES: CPT | Performed by: PHYSICAL THERAPIST

## 2020-05-14 ENCOUNTER — EVALUATION (OUTPATIENT)
Dept: PHYSICAL THERAPY | Facility: CLINIC | Age: 50
End: 2020-05-14
Payer: COMMERCIAL

## 2020-05-14 DIAGNOSIS — G89.29 CHRONIC PAIN OF BOTH KNEES: Primary | ICD-10-CM

## 2020-05-14 DIAGNOSIS — M25.561 CHRONIC PAIN OF BOTH KNEES: Primary | ICD-10-CM

## 2020-05-14 DIAGNOSIS — M25.562 CHRONIC PAIN OF BOTH KNEES: Primary | ICD-10-CM

## 2020-05-14 PROCEDURE — 97140 MANUAL THERAPY 1/> REGIONS: CPT | Performed by: PHYSICAL THERAPIST

## 2020-05-14 PROCEDURE — 97110 THERAPEUTIC EXERCISES: CPT | Performed by: PHYSICAL THERAPIST

## 2020-05-18 ENCOUNTER — OFFICE VISIT (OUTPATIENT)
Dept: PHYSICAL THERAPY | Facility: CLINIC | Age: 50
End: 2020-05-18
Payer: COMMERCIAL

## 2020-05-18 DIAGNOSIS — M25.561 CHRONIC PAIN OF BOTH KNEES: Primary | ICD-10-CM

## 2020-05-18 DIAGNOSIS — G89.29 CHRONIC PAIN OF BOTH KNEES: Primary | ICD-10-CM

## 2020-05-18 DIAGNOSIS — M25.562 CHRONIC PAIN OF BOTH KNEES: Primary | ICD-10-CM

## 2020-05-18 PROCEDURE — 97110 THERAPEUTIC EXERCISES: CPT | Performed by: PHYSICAL THERAPIST

## 2020-05-18 PROCEDURE — 97530 THERAPEUTIC ACTIVITIES: CPT | Performed by: PHYSICAL THERAPIST

## 2020-05-20 ENCOUNTER — OFFICE VISIT (OUTPATIENT)
Dept: OBGYN CLINIC | Facility: MEDICAL CENTER | Age: 50
End: 2020-05-20
Payer: COMMERCIAL

## 2020-05-20 VITALS
BODY MASS INDEX: 43.59 KG/M2 | DIASTOLIC BLOOD PRESSURE: 89 MMHG | TEMPERATURE: 98.6 F | HEIGHT: 63 IN | HEART RATE: 116 BPM | WEIGHT: 246 LBS | SYSTOLIC BLOOD PRESSURE: 135 MMHG

## 2020-05-20 DIAGNOSIS — G89.29 CHRONIC PAIN OF BOTH KNEES: ICD-10-CM

## 2020-05-20 DIAGNOSIS — M25.562 CHRONIC PAIN OF BOTH KNEES: ICD-10-CM

## 2020-05-20 DIAGNOSIS — M25.561 CHRONIC PAIN OF BOTH KNEES: ICD-10-CM

## 2020-05-20 DIAGNOSIS — M17.0 PRIMARY OSTEOARTHRITIS OF BOTH KNEES: Primary | ICD-10-CM

## 2020-05-20 PROCEDURE — 99243 OFF/OP CNSLTJ NEW/EST LOW 30: CPT | Performed by: ORTHOPAEDIC SURGERY

## 2020-05-20 PROCEDURE — 3008F BODY MASS INDEX DOCD: CPT | Performed by: FAMILY MEDICINE

## 2020-05-20 PROCEDURE — 3008F BODY MASS INDEX DOCD: CPT | Performed by: ORTHOPAEDIC SURGERY

## 2020-05-20 RX ORDER — MELOXICAM 15 MG/1
15 TABLET ORAL DAILY
Qty: 30 TABLET | Refills: 2 | Status: SHIPPED | OUTPATIENT
Start: 2020-05-20 | End: 2020-08-14 | Stop reason: SDUPTHER

## 2020-05-21 ENCOUNTER — OFFICE VISIT (OUTPATIENT)
Dept: PHYSICAL THERAPY | Facility: CLINIC | Age: 50
End: 2020-05-21
Payer: COMMERCIAL

## 2020-05-21 DIAGNOSIS — G89.29 CHRONIC PAIN OF BOTH KNEES: Primary | ICD-10-CM

## 2020-05-21 DIAGNOSIS — M25.561 CHRONIC PAIN OF BOTH KNEES: Primary | ICD-10-CM

## 2020-05-21 DIAGNOSIS — M25.562 CHRONIC PAIN OF BOTH KNEES: Primary | ICD-10-CM

## 2020-05-21 PROCEDURE — 97140 MANUAL THERAPY 1/> REGIONS: CPT | Performed by: PHYSICAL THERAPIST

## 2020-05-21 PROCEDURE — 97110 THERAPEUTIC EXERCISES: CPT | Performed by: PHYSICAL THERAPIST

## 2020-06-19 ENCOUNTER — PREP FOR PROCEDURE (OUTPATIENT)
Dept: GASTROENTEROLOGY | Facility: CLINIC | Age: 50
End: 2020-06-19

## 2020-06-19 DIAGNOSIS — Z20.822 ENCOUNTER FOR LABORATORY TESTING FOR COVID-19 VIRUS: Primary | ICD-10-CM

## 2020-07-01 NOTE — PRE-PROCEDURE INSTRUCTIONS
No outpatient medications have been marked as taking for the 7/7/20 encounter [facility] Encounter) with Edith Chung [phone]

## 2020-07-02 DIAGNOSIS — Z20.822 ENCOUNTER FOR LABORATORY TESTING FOR COVID-19 VIRUS: ICD-10-CM

## 2020-07-02 PROCEDURE — U0003 INFECTIOUS AGENT DETECTION BY NUCLEIC ACID (DNA OR RNA); SEVERE ACUTE RESPIRATORY SYNDROME CORONAVIRUS 2 (SARS-COV-2) (CORONAVIRUS DISEASE [COVID-19]), AMPLIFIED PROBE TECHNIQUE, MAKING USE OF HIGH THROUGHPUT TECHNOLOGIES AS DESCRIBED BY CMS-2020-01-R: HCPCS | Performed by: RADIOLOGY

## 2020-07-06 ENCOUNTER — ANESTHESIA EVENT (OUTPATIENT)
Dept: GASTROENTEROLOGY | Facility: HOSPITAL | Age: 50
End: 2020-07-06

## 2020-07-06 NOTE — ANESTHESIA PREPROCEDURE EVALUATION
Review of Systems/Medical History  Patient summary reviewed  Chart reviewed      Cardiovascular  Hypertension ,   Comment: palptations,  Pulmonary  Smoker cigarette smoker  , Tobacco cessation counseling given , Asthma ,        GI/Hepatic    Bowel prep       Kidney stones (H/O),        Endo/Other    Obesity  morbid obesity   GYN       Hematology  Anemia (H/O) ,     Musculoskeletal    Arthritis (knees)     Neurology  Negative neurology ROS      Psychology   Anxiety,              Physical Exam    Airway    Mallampati score: II  TM Distance: >3 FB  Neck ROM: full     Dental   Comment: Missing few,     Cardiovascular  Cardiovascular exam normal    Pulmonary  Pulmonary exam normal     Other Findings        Anesthesia Plan  ASA Score- 3     Anesthesia Type- IV sedation with anesthesia with ASA Monitors  Additional Monitors:   Airway Plan:         Plan Factors-    Induction-     Postoperative Plan-     Informed Consent- Anesthetic plan and risks discussed with patient  I personally reviewed this patient with the CRNA  Discussed and agreed on the Anesthesia Plan with the CRNA  Ramos Cervantes

## 2020-07-07 ENCOUNTER — ANESTHESIA (OUTPATIENT)
Dept: GASTROENTEROLOGY | Facility: HOSPITAL | Age: 50
End: 2020-07-07

## 2020-07-07 ENCOUNTER — HOSPITAL ENCOUNTER (OUTPATIENT)
Dept: GASTROENTEROLOGY | Facility: HOSPITAL | Age: 50
Setting detail: OUTPATIENT SURGERY
Discharge: HOME | End: 2020-07-07
Attending: INTERNAL MEDICINE | Admitting: INTERNAL MEDICINE
Payer: COMMERCIAL

## 2020-07-07 VITALS
RESPIRATION RATE: 16 BRPM | HEART RATE: 87 BPM | SYSTOLIC BLOOD PRESSURE: 140 MMHG | TEMPERATURE: 96.6 F | OXYGEN SATURATION: 96 % | DIASTOLIC BLOOD PRESSURE: 83 MMHG

## 2020-07-07 DIAGNOSIS — K92.1 BLOOD IN STOOL: ICD-10-CM

## 2020-07-07 PROCEDURE — NC001 PR NO CHARGE: Performed by: INTERNAL MEDICINE

## 2020-07-07 PROCEDURE — 45385 COLONOSCOPY W/LESION REMOVAL: CPT | Performed by: INTERNAL MEDICINE

## 2020-07-07 PROCEDURE — 43239 EGD BIOPSY SINGLE/MULTIPLE: CPT | Performed by: INTERNAL MEDICINE

## 2020-07-07 PROCEDURE — 88305 TISSUE EXAM BY PATHOLOGIST: CPT | Performed by: PATHOLOGY

## 2020-07-07 RX ORDER — SODIUM CHLORIDE 9 MG/ML
125 INJECTION, SOLUTION INTRAVENOUS CONTINUOUS
Status: DISCONTINUED | OUTPATIENT
Start: 2020-07-07 | End: 2020-07-11 | Stop reason: HOSPADM

## 2020-07-07 RX ORDER — GLYCOPYRROLATE 0.2 MG/ML
INJECTION INTRAMUSCULAR; INTRAVENOUS AS NEEDED
Status: DISCONTINUED | OUTPATIENT
Start: 2020-07-07 | End: 2020-07-07 | Stop reason: SURG

## 2020-07-07 RX ORDER — LIDOCAINE HYDROCHLORIDE 10 MG/ML
INJECTION, SOLUTION EPIDURAL; INFILTRATION; INTRACAUDAL; PERINEURAL AS NEEDED
Status: DISCONTINUED | OUTPATIENT
Start: 2020-07-07 | End: 2020-07-07 | Stop reason: SURG

## 2020-07-07 RX ORDER — PROPOFOL 10 MG/ML
INJECTION, EMULSION INTRAVENOUS AS NEEDED
Status: DISCONTINUED | OUTPATIENT
Start: 2020-07-07 | End: 2020-07-07 | Stop reason: SURG

## 2020-07-07 RX ADMIN — PROPOFOL 50 MG: 10 INJECTION, EMULSION INTRAVENOUS at 12:18

## 2020-07-07 RX ADMIN — TOPICAL ANESTHETIC 1 SPRAY: 200 SPRAY DENTAL; PERIODONTAL at 12:12

## 2020-07-07 RX ADMIN — SODIUM CHLORIDE: 0.9 INJECTION, SOLUTION INTRAVENOUS at 12:05

## 2020-07-07 RX ADMIN — GLYCOPYRROLATE 0.2 MG: 0.2 INJECTION, SOLUTION INTRAMUSCULAR; INTRAVENOUS at 12:05

## 2020-07-07 RX ADMIN — PROPOFOL 50 MG: 10 INJECTION, EMULSION INTRAVENOUS at 12:23

## 2020-07-07 RX ADMIN — PROPOFOL 100 MG: 10 INJECTION, EMULSION INTRAVENOUS at 12:33

## 2020-07-07 RX ADMIN — PROPOFOL 100 MG: 10 INJECTION, EMULSION INTRAVENOUS at 12:14

## 2020-07-07 RX ADMIN — SODIUM CHLORIDE 125 ML/HR: 0.9 INJECTION, SOLUTION INTRAVENOUS at 09:58

## 2020-07-07 RX ADMIN — LIDOCAINE HYDROCHLORIDE 80 MG: 10 INJECTION, SOLUTION EPIDURAL; INFILTRATION; INTRACAUDAL; PERINEURAL at 12:14

## 2020-07-07 NOTE — ANESTHESIA POSTPROCEDURE EVALUATION
Post-Op Assessment Note    CV Status:  Stable  Pain Score: 0    Pain management: adequate     Mental Status:  Alert and awake   Hydration Status:  Euvolemic   PONV Controlled:  Controlled   Airway Patency:  Patent   Post Op Vitals Reviewed: Yes      Staff: CRNA           BP  137/92   Temp     Pulse  89   Resp   24   SpO2   96

## 2020-07-07 NOTE — H&P
History and Physical -  [facility]  Bennie Bateman 44 y o  female MRN: [id number]                  HPI: Bennie Bateman is a 44y o  year old female who presents for anemia      REVIEW OF SYSTEMS: Per the HPI, and otherwise unremarkable      Historical Information   Past Medical History:   Diagnosis Date    Anemia     iron def with 12 weeks of infusions    Anxiety     Arthritis     knee    Asthma     Bipolar disorder (HCC)     Chronic pain disorder     knee    Hypertension     Kidney stone         Migraine     Palpitations     Pneumonia 2010    Psychiatric disorder      Past Surgical History:   Procedure Laterality Date    BREAST BIOPSY Right 2020     SECTION      CHOLECYSTECTOMY      FEMUR FRACTURE SURGERY      FRACTURE SURGERY      Treatment of fractures of both radial and ulnar shafts     FRACTURE SURGERY      Treatment pf tibial shaft fracture with plate/screws    GASTRIC BYPASS      HERNIA REPAIR      HYSTERECTOMY      SHOULDER SURGERY Right     US GUIDED BREAST BIOPSY RIGHT COMPLETE Right 3/13/2020     Social History   Social History     Substance and Sexual Activity   Alcohol Use Not Currently     Social History     Substance and Sexual Activity   Drug Use Never     Social History     Tobacco Use   Smoking Status Current Every Day Smoker    Packs/day: 0 50   Smokeless Tobacco Never Used     Family History   Problem Relation Age of Onset    Diabetes Father     Hypertension Father     Diabetes Maternal Grandmother     Hypertension Maternal Grandmother     Breast cancer Maternal Aunt     Ovarian cancer Maternal Aunt     Thyroid disease Mother     Hernia Mother        Meds/Allergies       (Not in a hospital admission)    Allergies   Allergen Reactions    Codeine     Latex     Morphine     Morphine And Related     Sulfa Antibiotics        Objective     /86   Pulse 80   Temp (!) 96 6 °F (35 9 °C)   Resp 20   SpO2 96% Breastfeeding No       PHYSICAL EXAM    Gen: NAD  CV: RRR  CHEST: Clear  ABD: soft, NT/ND  EXT: no edema      ASSESSMENT/PLAN:  This is a 44y o  year old female here for anemia, and she is stable and optimized for her procedure

## 2020-07-07 NOTE — DISCHARGE INSTRUCTIONS
Colonoscopy   WHAT YOU NEED TO KNOW:   A colonoscopy is a procedure to examine the inside of your colon (intestine) with a scope  Polyps or tissue growths may have been removed during your colonoscopy  It is normal to feel bloated and to have some abdominal discomfort  You should be passing gas  If you have hemorrhoids or you had polyps removed, you may have a small amount of bleeding  DISCHARGE INSTRUCTIONS:   Seek care immediately if:   · You have a large amount of bright red blood in your bowel movements  · Your abdomen is hard and firm and you have severe pain  · You have sudden trouble breathing  Contact your healthcare provider if:   · You develop a rash or hives  · You have a fever within 24 hours of your procedure  · You have not had a bowel movement for 3 days after your procedure  · You have questions or concerns about your condition or care  Activity:   · Do not lift, strain, or run  for 3 days after your procedure  · Rest after your procedure  You have been given medicine to relax you  Do not  drive or make important decisions until the day after your procedure  Return to your normal activity as directed  · Relieve gas and discomfort from bloating  by lying on your right side with a heating pad on your abdomen  You may need to take short walks to help the gas move out  Eat small meals until bloating is relieved  If you had polyps removed: For 7 days after your procedure:  · Do not  take aspirin  · Do not  go on long car rides  Help prevent constipation:   · Eat a variety of healthy foods  Healthy foods include fruit, vegetables, whole-grain breads, low-fat dairy products, beans, lean meat, and fish  Ask if you need to be on a special diet  Your healthcare provider may recommend that you eat high-fiber foods such as cooked beans  Fiber helps you have regular bowel movements  · Drink liquids as directed    Adults should drink between 9 and 13 eight-ounce cups of liquid every day  Ask what amount is best for you  For most people, good liquids to drink are water, juice, and milk  · Exercise as directed  Talk to your healthcare provider about the best exercise plan for you  Exercise can help prevent constipation, decrease your blood pressure and improve your health  Follow up with your healthcare provider as directed:  Write down your questions so you remember to ask them during your visits  © 2017 [address] [employer] Information is for End User's use only and may not be sold, redistributed or otherwise used for commercial purposes  All illustrations and images included in CareNotes® are the copyrighted property of A [employer] M [employer]  or Matt Broderick  The above information is an  only  It is not intended as medical advice for individual conditions or treatments  Talk to your doctor, nurse or pharmacist before following any medical regimen to see if it is safe and effective for you  Upper Endoscopy   WHAT YOU NEED TO KNOW:   An upper endoscopy is also called an upper gastrointestinal (GI) endoscopy, or an esophagogastroduodenoscopy (EGD)  You may feel bloated, gassy, or have some abdominal discomfort after your procedure  Your throat may be sore for 24 to 36 hours  You may burp or pass gas from air that is still inside your body  DISCHARGE INSTRUCTIONS:   Call 911 for any of the following:   · You have sudden chest pain or trouble breathing  Seek care immediately if:   · You feel dizzy or faint  · You have trouble swallowing  · Your bowel movements are very dark or black  · Your abdomen is hard and firm and you have severe pain  · You vomit blood  Contact your healthcare provider if:   · You feel full or bloated and cannot burp or pass gas  · You have not had a bowel movement for 3 days after your procedure  · You have neck pain  · You have a fever or chills  · You have nausea or are vomiting      · You have a rash or hives  · You have questions or concerns about your endoscopy  Relieve a sore throat:  Suck on throat lozenges or crushed ice  Gargle with a small amount of warm salt water  Mix 1 teaspoon of salt and 1 cup of warm water to make salt water  Relieve gas and discomfort from bloating:  Lie on your right side with a heating pad on your abdomen  Take short walks to help pass gas  Eat small meals until bloating is relieved  Rest after your procedure: You have been given medicine to relax you  Do not  drive or make important decisions until the day after your procedure  Return to your normal activity as directed  You can usually return to work the day after your procedure  Follow up with your healthcare provider as directed:  Write down your questions so you remember to ask them during your visits  © 2017 [address] [employer] is for End User's use only and may not be sold, redistributed or otherwise used for commercial purposes  All illustrations and images included in CareNotes® are the copyrighted property of A [employer] M [employer]  or Matt Broderick  The above information is an  only  It is not intended as medical advice for individual conditions or treatments  Talk to your doctor, nurse or pharmacist before following any medical regimen to see if it is safe and effective for you

## 2020-07-09 LAB — SARS-COV-2 RNA SPEC QL NAA+PROBE: NOT DETECTED

## 2020-08-04 ENCOUNTER — APPOINTMENT (OUTPATIENT)
Dept: LAB | Facility: HOSPITAL | Age: 50
End: 2020-08-04
Attending: INTERNAL MEDICINE
Payer: COMMERCIAL

## 2020-08-04 DIAGNOSIS — D50.9 IRON DEFICIENCY ANEMIA, UNSPECIFIED IRON DEFICIENCY ANEMIA TYPE: ICD-10-CM

## 2020-08-04 LAB
ALBUMIN SERPL BCP-MCNC: 3.6 G/DL (ref 3.5–5)
ALP SERPL-CCNC: 116 U/L (ref 46–116)
ALT SERPL W P-5'-P-CCNC: 27 U/L (ref 12–78)
ANION GAP SERPL CALCULATED.3IONS-SCNC: 7 MMOL/L (ref 4–13)
AST SERPL W P-5'-P-CCNC: 26 U/L (ref 5–45)
BASOPHILS # BLD AUTO: 0.06 THOUSANDS/ΜL (ref 0–0.1)
BASOPHILS NFR BLD AUTO: 1 % (ref 0–1)
BILIRUB SERPL-MCNC: 0.5 MG/DL (ref 0.2–1)
BUN SERPL-MCNC: 30 MG/DL (ref 5–25)
CALCIUM SERPL-MCNC: 9.1 MG/DL (ref 8.3–10.1)
CHLORIDE SERPL-SCNC: 103 MMOL/L (ref 100–108)
CO2 SERPL-SCNC: 30 MMOL/L (ref 21–32)
CREAT SERPL-MCNC: 0.86 MG/DL (ref 0.6–1.3)
EOSINOPHIL # BLD AUTO: 0.2 THOUSAND/ΜL (ref 0–0.61)
EOSINOPHIL NFR BLD AUTO: 3 % (ref 0–6)
ERYTHROCYTE [DISTWIDTH] IN BLOOD BY AUTOMATED COUNT: 12.6 % (ref 11.6–15.1)
FERRITIN SERPL-MCNC: 74 NG/ML (ref 8–388)
GFR SERPL CREATININE-BSD FRML MDRD: 78 ML/MIN/1.73SQ M
GLUCOSE SERPL-MCNC: 57 MG/DL (ref 65–140)
HCT VFR BLD AUTO: 48 % (ref 34.8–46.1)
HGB BLD-MCNC: 15.3 G/DL (ref 11.5–15.4)
IMM GRANULOCYTES # BLD AUTO: 0.03 THOUSAND/UL (ref 0–0.2)
IMM GRANULOCYTES NFR BLD AUTO: 1 % (ref 0–2)
IRON SATN MFR SERPL: 31 %
IRON SERPL-MCNC: 118 UG/DL (ref 50–170)
LYMPHOCYTES # BLD AUTO: 1.45 THOUSANDS/ΜL (ref 0.6–4.47)
LYMPHOCYTES NFR BLD AUTO: 24 % (ref 14–44)
MCH RBC QN AUTO: 31.2 PG (ref 26.8–34.3)
MCHC RBC AUTO-ENTMCNC: 31.9 G/DL (ref 31.4–37.4)
MCV RBC AUTO: 98 FL (ref 82–98)
MONOCYTES # BLD AUTO: 0.64 THOUSAND/ΜL (ref 0.17–1.22)
MONOCYTES NFR BLD AUTO: 11 % (ref 4–12)
NEUTROPHILS # BLD AUTO: 3.69 THOUSANDS/ΜL (ref 1.85–7.62)
NEUTS SEG NFR BLD AUTO: 60 % (ref 43–75)
NRBC BLD AUTO-RTO: 0 /100 WBCS
PLATELET # BLD AUTO: 249 THOUSANDS/UL (ref 149–390)
PMV BLD AUTO: 9.8 FL (ref 8.9–12.7)
POTASSIUM SERPL-SCNC: 4 MMOL/L (ref 3.5–5.3)
PROT SERPL-MCNC: 7.5 G/DL (ref 6.4–8.2)
RBC # BLD AUTO: 4.9 MILLION/UL (ref 3.81–5.12)
SODIUM SERPL-SCNC: 140 MMOL/L (ref 136–145)
TIBC SERPL-MCNC: 375 UG/DL (ref 250–450)
WBC # BLD AUTO: 6.07 THOUSAND/UL (ref 4.31–10.16)

## 2020-08-04 PROCEDURE — 80053 COMPREHEN METABOLIC PANEL: CPT

## 2020-08-04 PROCEDURE — 83918 ORGANIC ACIDS TOTAL QUANT: CPT

## 2020-08-04 PROCEDURE — 82728 ASSAY OF FERRITIN: CPT

## 2020-08-04 PROCEDURE — 85025 COMPLETE CBC W/AUTO DIFF WBC: CPT

## 2020-08-04 PROCEDURE — 83540 ASSAY OF IRON: CPT

## 2020-08-04 PROCEDURE — 36415 COLL VENOUS BLD VENIPUNCTURE: CPT

## 2020-08-04 PROCEDURE — 83550 IRON BINDING TEST: CPT

## 2020-08-07 LAB
METHYLMALONATE SERPL-SCNC: 405 NMOL/L (ref 0–378)
SL AMB DISCLAIMER: ABNORMAL

## 2020-08-13 ENCOUNTER — TELEPHONE (OUTPATIENT)
Dept: FAMILY MEDICINE CLINIC | Facility: CLINIC | Age: 50
End: 2020-08-13

## 2020-08-13 ENCOUNTER — TELEMEDICINE (OUTPATIENT)
Dept: FAMILY MEDICINE CLINIC | Facility: CLINIC | Age: 50
End: 2020-08-13

## 2020-08-13 DIAGNOSIS — M25.562 CHRONIC PAIN OF BOTH KNEES: Primary | ICD-10-CM

## 2020-08-13 DIAGNOSIS — M25.561 CHRONIC PAIN OF BOTH KNEES: Primary | ICD-10-CM

## 2020-08-13 DIAGNOSIS — M17.0 PRIMARY OSTEOARTHRITIS OF BOTH KNEES: ICD-10-CM

## 2020-08-13 DIAGNOSIS — J30.2 SEASONAL ALLERGIES: ICD-10-CM

## 2020-08-13 DIAGNOSIS — G89.29 CHRONIC PAIN OF BOTH KNEES: Primary | ICD-10-CM

## 2020-08-13 PROCEDURE — 99214 OFFICE O/P EST MOD 30 MIN: CPT | Performed by: FAMILY MEDICINE

## 2020-08-13 NOTE — ASSESSMENT & PLAN NOTE
Symptoms are poorly controlled at this time   Avoid exposure to polluted air, tobacco smoke, and other known allergy triggers  Discussed the PRN use of OTC nasal sinus rinse to help with symptoms   Discussed importance of machine drying as opposed to hanging them outside on a clothesline to decrease allergens   Symptoms may improve if windows are kept close in the summer   Be sure air conditioner filter is clean   Consider home air purifier to remove allergens      -  Trial Claritin and Flonase   -  Follow-up virtual visit 1 week

## 2020-08-13 NOTE — ASSESSMENT & PLAN NOTE
Followed up with orthopedic surgery  Uninterested in knee replacement at this time  - Will schedule in office for bilateral knee injections in coming weeks     -  Prescription for meloxicam sent

## 2020-08-13 NOTE — PROGRESS NOTES
Virtual Brief Visit    Assessment/Plan:    Problem List Items Addressed This Visit        Other    Chronic pain of both knees - Primary      Followed up with orthopedic surgery  Uninterested in knee replacement at this time  - Will schedule in office for bilateral knee injections in coming weeks  -  Prescription for meloxicam sent         Seasonal allergies     Symptoms are poorly controlled at this time   Avoid exposure to polluted air, tobacco smoke, and other known allergy triggers  Discussed the PRN use of OTC nasal sinus rinse to help with symptoms   Discussed importance of machine drying as opposed to hanging them outside on a clothesline to decrease allergens   Symptoms may improve if windows are kept close in the summer   Be sure air conditioner filter is clean   Consider home air purifier to remove allergens  -  Trial Claritin and Flonase   -  Follow-up virtual visit 1 week                       Reason for visit is   Chief Complaint   Patient presents with    Virtual Brief Visit        Encounter provider Monica Falcon MD    Provider located at [address] [place] [id number] [phone]    Recent Visits  No visits were found meeting these conditions  Showing recent visits within past 7 days and meeting all other requirements     Today's Visits  Date Type Provider Dept   08/13/20 Telephone MD Fant[facility]   08/13/20 Telemedicine MD Fantasma [facility]   Showing today's visits and meeting all other requirements     Future Appointments  No visits were found meeting these conditions  Showing future appointments within next 150 days and meeting all other requirements        After connecting through telephone, the patient was identified by name and date of birth  Coby Enriquez was informed that this is a telemedicine visit and that the visit is being conducted through telephone  My office door was closed   No one else was in the room  She acknowledged consent and understanding of privacy and security of the platform  The patient has agreed to participate and understands she can discontinue the visit at any time  Patient is aware this is a billable service  Subjective    Bart Lobo is a 44 y o  female  Presents for sick visit  Currently  Complaining eye itchiness runny nose nonproductive cough, symptoms are worsened with pollen exposure  Admits to having history of seasonal allergies, his tried Benadryl in the past with minimal resolution of symptoms  Denies any fever sick contacts mucopurulent drainage        HPI     Past Medical History:   Diagnosis Date    Anemia     iron def with 12 weeks of infusions    Anxiety     Arthritis     knee    Asthma     Bipolar disorder (HCC)     Chronic pain disorder     knee    Hypertension     Kidney stone         Migraine     Palpitations     Pneumonia 2010    Psychiatric disorder        Past Surgical History:   Procedure Laterality Date    BREAST BIOPSY Right 2020     SECTION      CHOLECYSTECTOMY      FEMUR FRACTURE SURGERY      FRACTURE SURGERY      Treatment of fractures of both radial and ulnar shafts     FRACTURE SURGERY      Treatment pf tibial shaft fracture with plate/screws    GASTRIC BYPASS      HERNIA REPAIR      HYSTERECTOMY      SHOULDER SURGERY Right     US GUIDED BREAST BIOPSY RIGHT COMPLETE Right 3/13/2020       Current Outpatient Medications   Medication Sig Dispense Refill    albuterol (PROVENTIL HFA,VENTOLIN HFA) 90 mcg/act inhaler Inhale 2 puffs every 6 (six) hours as needed for wheezing or shortness of breath 1 Inhaler 0    benzonatate (TESSALON PERLES) 100 mg capsule Take 1 capsule (100 mg total) by mouth 3 (three) times a day as needed for cough (Patient not taking: Reported on 2020) 30 capsule 0    bisacodyl (DULCOLAX) 5 mg EC tablet Take 2 tablets (10 mg total) by mouth once for 1 dose 2 tablet 0    ferrous sulfate 325 (65 Fe) mg tablet Take 1 tablet (325 mg total) by mouth daily with breakfast 30 tablet 0    guaiFENesin (ROBITUSSIN) 100 MG/5ML oral liquid Take 10 mL (200 mg total) by mouth 3 (three) times a day as needed for cough (Patient not taking: Reported on 1/28/2020) 120 mL 0    LORazepam (ATIVAN) 0 5 mg tablet Take 0 5 mg by mouth daily as needed  2    meloxicam (MOBIC) 15 mg tablet Take 1 tablet (15 mg total) by mouth daily 30 tablet 2    polyethylene glycol (GOLYTELY) 4000 mL solution Take 4,000 mL by mouth once for 1 dose 4000 mL 0    QUEtiapine (SEROquel) 100 mg tablet Take 100 mg by mouth daily at bedtime      venlafaxine (EFFEXOR) 50 mg tablet Take 225 mg by mouth daily       venlafaxine (EFFEXOR-XR) 150 mg 24 hr capsule Take 150 mg by mouth daily      venlafaxine (EFFEXOR-XR) 75 mg 24 hr capsule Take 75 mg by mouth daily       No current facility-administered medications for this visit  Allergies   Allergen Reactions    Codeine     Latex     Morphine     Morphine And Related     Sulfa Antibiotics        Review of Systems   Constitutional: Negative for activity change, appetite change, fatigue, fever and unexpected weight change  HENT: Positive for congestion  Negative for sneezing, sore throat and tinnitus  Eyes: Negative for pain  Respiratory: Negative for cough, choking and chest tightness  Cardiovascular: Negative for chest pain, palpitations and leg swelling  Gastrointestinal: Negative for abdominal distention, abdominal pain, constipation, diarrhea, nausea and vomiting  Endocrine: Negative for polydipsia, polyphagia and polyuria  Genitourinary: Negative for dysuria and flank pain  Musculoskeletal: Positive for back pain  Neurological: Negative for dizziness, seizures, syncope, speech difficulty, light-headedness and numbness  Psychiatric/Behavioral: Negative for agitation  There were no vitals filed for this visit        I spent 20 minutes directly with the patient during this visit    JEREMÍAS RODRIGUEZ [address]  acknowledges that she has consented to an online visit or consultation  She understands that the online visit is based solely on information provided by her, and that, in the absence of a face-to-face physical evaluation by the physician, the diagnosis she receives is both limited and provisional in terms of accuracy and completeness  This is not intended to replace a full medical face-to-face evaluation by the physician  Owen Cheadle understands and accepts these terms  It was my intent to perform this visit via video technology but the patient was not able to do a video connection so the visit was completed via audio telephone only

## 2020-08-14 RX ORDER — LORATADINE 10 MG/1
10 TABLET ORAL DAILY
Qty: 100 TABLET | Refills: 1 | Status: SHIPPED | OUTPATIENT
Start: 2020-08-14 | End: 2020-12-23 | Stop reason: SDUPTHER

## 2020-08-14 RX ORDER — KETOTIFEN FUMARATE 0.35 MG/ML
1 SOLUTION/ DROPS OPHTHALMIC 2 TIMES DAILY
Qty: 5 ML | Refills: 0 | Status: SHIPPED | OUTPATIENT
Start: 2020-08-14

## 2020-08-14 RX ORDER — MELOXICAM 15 MG/1
15 TABLET ORAL DAILY
Qty: 30 TABLET | Refills: 0 | Status: SHIPPED | OUTPATIENT
Start: 2020-08-14 | End: 2020-09-06

## 2020-08-14 RX ORDER — FLUTICASONE PROPIONATE 50 MCG
1 SPRAY, SUSPENSION (ML) NASAL DAILY
Qty: 1 BOTTLE | Refills: 2 | Status: SHIPPED | OUTPATIENT
Start: 2020-08-14

## 2020-09-02 ENCOUNTER — OFFICE VISIT (OUTPATIENT)
Dept: HEMATOLOGY ONCOLOGY | Facility: HOSPITAL | Age: 50
End: 2020-09-02
Payer: COMMERCIAL

## 2020-09-02 VITALS
SYSTOLIC BLOOD PRESSURE: 146 MMHG | WEIGHT: 256 LBS | DIASTOLIC BLOOD PRESSURE: 96 MMHG | OXYGEN SATURATION: 91 % | HEART RATE: 107 BPM | HEIGHT: 62 IN | RESPIRATION RATE: 16 BRPM | TEMPERATURE: 98.6 F | BODY MASS INDEX: 47.11 KG/M2

## 2020-09-02 DIAGNOSIS — E53.8 B12 DEFICIENCY: Primary | ICD-10-CM

## 2020-09-02 PROCEDURE — 99214 OFFICE O/P EST MOD 30 MIN: CPT | Performed by: INTERNAL MEDICINE

## 2020-09-02 PROCEDURE — 4004F PT TOBACCO SCREEN RCVD TLK: CPT | Performed by: INTERNAL MEDICINE

## 2020-09-02 RX ORDER — CYANOCOBALAMIN 1000 UG/ML
1000 INJECTION INTRAMUSCULAR; SUBCUTANEOUS ONCE
Status: CANCELLED | OUTPATIENT
Start: 2020-09-14

## 2020-09-02 NOTE — PROGRESS NOTES
Hematology/Oncology Outpatient Follow- up Note  Ariadna Arredondo 44 y o  female MRN: @ Encounter: [id number]        Date:  9/2/2020    Presenting Complaint/Diagnosis : Iron deficiency anemia    HPI:    Josee Jones seen for initial consultation 12/18/2019 regarding iron deficiency anemia   She was seen in the emergency room on 11/22/2019 with a hemoglobin of 6 2, MCV 68  She did have iron studies that reflected iron saturation 3%   She did not have any repeat blood work after she was seen in the emergency room  [facility] and platelet counts are normal   Previous blood work from February 2017 reflects a normal hemoglobin running in the 14 range   I do not have blood work in between 2017 and 2019       She does report a past medical history significant for gastric bypass in 2003 and also reports a partial hysterectomy in 2006  She has not had any vaginal bleeding since the 2006       Previous Hematologic/ Oncologic History:    Workup  Venofer for 10 doses    Current Hematologic/ Oncologic Treatment:    Observation    Interval History:    The patient returns for follow-up visit  She states she is doing well  She is up-to-date on her screening  Her iron studies are now normal   Hemoglobin is normal   MMA was elevated so I will set her up to receive B12 once a month  She states she would prefer this over pills especially since pills may not be absorbed very well because of her history of gastric bypass  Denies any nausea denies any vomiting denies any diarrhea  The rest of her 14 point review of systems today was negative  She does feel fatigued  Test Results:    Imaging: No results found      Labs:   Lab Results   Component Value Date    WBC 6 07 08/04/2020    HGB 15 3 08/04/2020    HCT 48 0 (H) 08/04/2020    MCV 98 08/04/2020     08/04/2020     Lab Results   Component Value Date     08/25/2014    K 4 0 08/04/2020     08/04/2020    CO2 30 08/04/2020    ANIONGAP 6 08/25/2014    BUN 30 (H) 2020    CREATININE 0 86 2020    GLUCOSE 96 2014    GLUF 85 2019    CALCIUM 9 1 2020    AST 26 2020    ALT 27 2020    ALKPHOS 116 2020    PROT 7 4 2014    BILITOT 0 42 2014    EGFR 78 2020     Lab Results   Component Value Date    IRON 118 2020    TIBC 375 2020    FERRITIN 74 2020     ROS: As stated in the history of present illness otherwise his 14 point review of systems today was negative        Active Problems:   Patient Active Problem List   Diagnosis    Onychomycosis    Obesity (BMI 35 0-39 9 without comorbidity)    Iron deficiency anemia, unspecified    Chronic pain of both knees    Hordeolum externum of left upper eyelid    Primary osteoarthritis of both knees    Seasonal allergies       Past Medical History:   Past Medical History:   Diagnosis Date    Anemia 2020    iron def with 12 weeks of infusions    Anxiety     Arthritis     knee    Asthma     Bipolar disorder ([address] )     Chronic pain disorder     knee    Hypertension     Kidney stone         Migraine     Palpitations     Pneumonia 2010    Psychiatric disorder        Surgical History:   Past Surgical History:   Procedure Laterality Date    BREAST BIOPSY Right 2020     SECTION      CHOLECYSTECTOMY      FEMUR FRACTURE SURGERY      FRACTURE SURGERY      Treatment of fractures of both radial and ulnar shafts     FRACTURE SURGERY      Treatment pf tibial shaft fracture with plate/screws    GASTRIC BYPASS      HERNIA REPAIR      HYSTERECTOMY      SHOULDER SURGERY Right     US GUIDED BREAST BIOPSY RIGHT COMPLETE Right 3/13/2020       Family History:    Family History   Problem Relation Age of Onset    Diabetes Father     Hypertension Father     Diabetes Maternal Grandmother     Hypertension Maternal Grandmother     Breast cancer Maternal Aunt     Ovarian cancer Maternal Aunt     Thyroid disease Mother     Hernia Mother Cancer-related family history includes Breast cancer in her maternal aunt; Ovarian cancer in her maternal aunt      Social History:   Social History     Socioeconomic History    Marital status: /Civil Union     Spouse name: Not on file    Number of children: Not on file    Years of education: Not on file    Highest education level: Not on file   Occupational History    Not on file   Social Needs    Financial resource strain: Not on file    Food insecurity     Worry: Not on file     Inability: Not on file   [employer] needs     Medical: Not on file     Non-medical: Not on file   Tobacco Use    Smoking status: Current Every Day Smoker     Packs/day: 0 50    Smokeless tobacco: Never Used   Substance and Sexual Activity    Alcohol use: Not Currently    Drug use: Never    Sexual activity: Yes     Partners: Male   Lifestyle    Physical activity     Days per week: Not on file     Minutes per session: Not on file    Stress: Not on file   Relationships    Social connections     Talks on phone: Not on file     Gets together: Not on file     Attends Gnosticism service: Not on file     Active member of club or organization: Not on file     Attends meetings of clubs or organizations: Not on file     Relationship status: Not on file    Intimate partner violence     Fear of current or ex partner: Not on file     Emotionally abused: Not on file     Physically abused: Not on file     Forced sexual activity: Not on file   Other Topics Concern    Not on file   Social History Narrative    ** Merged History Encounter **         Never drank alcohol - As per [employer]        Current Medications:   Current Outpatient Medications   Medication Sig Dispense Refill    albuterol (PROVENTIL HFA,VENTOLIN HFA) 90 mcg/act inhaler Inhale 2 puffs every 6 (six) hours as needed for wheezing or shortness of breath 1 Inhaler 0    benzonatate (TESSALON PERLES) 100 mg capsule Take 1 capsule (100 mg total) by mouth 3 (three) times a day as needed for cough 30 capsule 0    fluticasone (FLONASE) 50 mcg/act nasal spray 1 spray into each nostril daily 1 Bottle 2    guaiFENesin (ROBITUSSIN) 100 MG/5ML oral liquid Take 10 mL (200 mg total) by mouth 3 (three) times a day as needed for cough 120 mL 0    ketotifen (ZADITOR) 0 025 % ophthalmic solution Administer 1 drop to both eyes 2 (two) times a day 5 mL 0    loratadine (CLARITIN) 10 mg tablet Take 1 tablet (10 mg total) by mouth daily 100 tablet 1    LORazepam (ATIVAN) 0 5 mg tablet Take 0 5 mg by mouth daily as needed  2    meloxicam (MOBIC) 15 mg tablet Take 1 tablet (15 mg total) by mouth daily 30 tablet 0    QUEtiapine (SEROquel) 100 mg tablet Take 100 mg by mouth daily at bedtime      venlafaxine (EFFEXOR) 50 mg tablet Take 225 mg by mouth daily       venlafaxine (EFFEXOR-XR) 150 mg 24 hr capsule Take 150 mg by mouth daily      venlafaxine (EFFEXOR-XR) 75 mg 24 hr capsule Take 75 mg by mouth daily      bisacodyl (DULCOLAX) 5 mg EC tablet Take 2 tablets (10 mg total) by mouth once for 1 dose 2 tablet 0    ferrous sulfate 325 (65 Fe) mg tablet Take 1 tablet (325 mg total) by mouth daily with breakfast 30 tablet 0    polyethylene glycol (GOLYTELY) 4000 mL solution Take 4,000 mL by mouth once for 1 dose 4000 mL 0     No current facility-administered medications for this visit  Allergies: Allergies   Allergen Reactions    Codeine     Latex     Morphine     Morphine And Related     Sulfa Antibiotics        Physical Exam:    Body surface area is 2 12 meters squared      Wt Readings from Last 3 Encounters:   09/02/20 116 kg (256 lb)   05/20/20 112 kg (246 lb)   04/13/20 112 kg (246 lb)        Temp Readings from Last 3 Encounters:   09/02/20 98 6 °F (37 °C) (Tympanic)   07/07/20 (!) 96 6 °F (35 9 °C)   05/20/20 98 6 °F (37 °C)        BP Readings from Last 3 Encounters:   09/02/20 146/96   07/07/20 140/83   05/20/20 135/89         Pulse Readings from Last 3 Encounters:   09/02/20 (!) 107   07/07/20 87   05/20/20 (!) 116         Physical Exam     Constitutional   General appearance: No acute distress, well appearing and well nourished  Eyes   Conjunctiva and lids: No swelling, erythema or discharge  Pupils and irises: Equal, round and reactive to light  Ears, Nose, Mouth, and Throat   External inspection of ears and nose: Normal     Nasal mucosa, septum, and turbinates: Normal without edema or erythema  Oropharynx: Normal with no erythema, edema, exudate or lesions  Pulmonary   Respiratory effort: No increased work of breathing or signs of respiratory distress  Auscultation of lungs: Clear to auscultation  Cardiovascular   Palpation of heart: Normal PMI, no thrills  Auscultation of heart: Normal rate and rhythm, normal S1 and S2, without murmurs  Examination of extremities for edema and/or varicosities: Normal     Carotid pulses: Normal     Abdomen   Abdomen: Non-tender, no masses  Liver and spleen: No hepatomegaly or splenomegaly  Lymphatic   Palpation of lymph nodes in neck: No lymphadenopathy  Musculoskeletal   Gait and station: Normal     Digits and nails: Normal without clubbing or cyanosis  Inspection/palpation of joints, bones, and muscles: Normal     Skin   Skin and subcutaneous tissue: Normal without rashes or lesions  Neurologic   Cranial nerves: Cranial nerves 2-12 intact  Sensation: No sensory loss  Psychiatric   Orientation to person, place, and time: Normal     Mood and affect: Normal         Assessment / Plan:      The patient is a pleasant 61-year-old female with a past medical history of gastric bypass who initially presented with iron deficiency anemia  She was treated with 10 doses of Venofer  Her iron studies are now normal   MMA is elevated  Hemoglobin is 15 3 with a white count of 6 07 and a platelet count of 346  At this point we will continue her on observation    She does need B12 and have advised her to either take sublingual B12 or we could offer intramuscular shots every month  The patient prefers to get a shot because of the variability of absorption from oral B12  I think this is reasonable  I will set her up  I will see her back in 6 months with repeat blood work  Goals and Barriers:  Current Goal:  Prolong Survival from B12 deficiency and iron deficiency status post gastric bypass   Barriers: None  Patient's Capacity to Self Care:  Patient  able to self care  Portions of the record may have been created with voice recognition software   Occasional wrong word or "sound a like" substitutions may have occurred due to the inherent limitations of voice recognition software   Read the chart carefully and recognize, using context, where substitutions have occurred

## 2020-09-05 DIAGNOSIS — M17.0 PRIMARY OSTEOARTHRITIS OF BOTH KNEES: ICD-10-CM

## 2020-09-06 RX ORDER — MELOXICAM 15 MG/1
TABLET ORAL
Qty: 30 TABLET | Refills: 0 | Status: SHIPPED | OUTPATIENT
Start: 2020-09-06 | End: 2020-12-23 | Stop reason: SDUPTHER

## 2020-09-16 ENCOUNTER — HOSPITAL ENCOUNTER (OUTPATIENT)
Dept: INFUSION CENTER | Facility: HOSPITAL | Age: 50
Discharge: HOME | End: 2020-09-16
Attending: INTERNAL MEDICINE
Payer: COMMERCIAL

## 2020-09-16 VITALS
SYSTOLIC BLOOD PRESSURE: 154 MMHG | OXYGEN SATURATION: 96 % | RESPIRATION RATE: 18 BRPM | HEART RATE: 86 BPM | DIASTOLIC BLOOD PRESSURE: 102 MMHG | TEMPERATURE: 97.2 F

## 2020-09-16 DIAGNOSIS — E53.8 B12 DEFICIENCY: Primary | ICD-10-CM

## 2020-09-16 DIAGNOSIS — D50.9 IRON DEFICIENCY ANEMIA, UNSPECIFIED IRON DEFICIENCY ANEMIA TYPE: ICD-10-CM

## 2020-09-16 PROCEDURE — 96372 THER/PROPH/DIAG INJ SC/IM: CPT

## 2020-09-16 RX ORDER — CYANOCOBALAMIN 1000 UG/ML
1000 INJECTION INTRAMUSCULAR; SUBCUTANEOUS ONCE
Status: CANCELLED | OUTPATIENT
Start: 2020-10-14

## 2020-09-16 RX ORDER — CYANOCOBALAMIN 1000 UG/ML
1000 INJECTION INTRAMUSCULAR; SUBCUTANEOUS ONCE
Status: COMPLETED | OUTPATIENT
Start: 2020-09-16 | End: 2020-09-16

## 2020-09-16 RX ADMIN — CYANOCOBALAMIN 1000 MCG: 1000 INJECTION, SOLUTION INTRAMUSCULAR at 14:34

## 2020-09-16 NOTE — PLAN OF CARE
Problem: Knowledge Deficit  Goal: Patient/family/caregiver demonstrates understanding of disease process, treatment plan, medications, and discharge instructions  Description: Complete learning assessment and assess knowledge base    Interventions:  - Provide teaching at level of understanding  - Provide teaching via preferred learning methods  9/16/2020 1450 by Casey Melissa RN  Outcome: Progressing  9/16/2020 1450 by Casey Melissa RN  Outcome: Progressing

## 2020-09-16 NOTE — PROGRESS NOTES
Saida Duarte RN with Dr Radha Garcia office notified of elevated Bp 154/102 after resting for 15 minutes  Patient ok to proceed with B12 shot today  Pt to follow up with PCP regarding elevated Bp  Current PB and education printed on AVS and explained importance to patient  Pt tolerated B12 IM in left deltoid with no adverse reactions   Pt then d/c'd home ambulatory with steady gait

## 2020-09-17 ENCOUNTER — PROCEDURE VISIT (OUTPATIENT)
Dept: FAMILY MEDICINE CLINIC | Facility: CLINIC | Age: 50
End: 2020-09-17

## 2020-09-17 VITALS
WEIGHT: 256 LBS | BODY MASS INDEX: 47.11 KG/M2 | TEMPERATURE: 98 F | HEIGHT: 62 IN | OXYGEN SATURATION: 91 % | HEART RATE: 95 BPM | RESPIRATION RATE: 16 BRPM | SYSTOLIC BLOOD PRESSURE: 144 MMHG | DIASTOLIC BLOOD PRESSURE: 98 MMHG

## 2020-09-17 DIAGNOSIS — I10 ESSENTIAL HYPERTENSION: ICD-10-CM

## 2020-09-17 DIAGNOSIS — G89.29 CHRONIC PAIN OF BOTH KNEES: Primary | ICD-10-CM

## 2020-09-17 DIAGNOSIS — M25.561 CHRONIC PAIN OF BOTH KNEES: Primary | ICD-10-CM

## 2020-09-17 DIAGNOSIS — M25.562 CHRONIC PAIN OF BOTH KNEES: Primary | ICD-10-CM

## 2020-09-17 PROCEDURE — 20610 DRAIN/INJ JOINT/BURSA W/O US: CPT | Performed by: FAMILY MEDICINE

## 2020-09-17 RX ORDER — TRIAMCINOLONE ACETONIDE 40 MG/ML
80 INJECTION, SUSPENSION INTRA-ARTICULAR; INTRAMUSCULAR
Status: COMPLETED | OUTPATIENT
Start: 2020-09-17 | End: 2020-09-17

## 2020-09-17 RX ORDER — LIDOCAINE HYDROCHLORIDE 10 MG/ML
5 INJECTION, SOLUTION INFILTRATION; PERINEURAL
Status: COMPLETED | OUTPATIENT
Start: 2020-09-17 | End: 2020-09-17

## 2020-09-17 RX ORDER — LISINOPRIL 20 MG/1
20 TABLET ORAL DAILY
Qty: 90 TABLET | Refills: 0 | Status: SHIPPED | OUTPATIENT
Start: 2020-09-17 | End: 2020-12-04

## 2020-09-17 RX ADMIN — LIDOCAINE HYDROCHLORIDE 5 ML: 10 INJECTION, SOLUTION INFILTRATION; PERINEURAL at 16:10

## 2020-09-17 RX ADMIN — TRIAMCINOLONE ACETONIDE 80 MG: 40 INJECTION, SUSPENSION INTRA-ARTICULAR; INTRAMUSCULAR at 16:10

## 2020-09-17 NOTE — ASSESSMENT & PLAN NOTE
Risks of the injection including pain, infection, tendon rupture, progression of arthritis, fat atrophy, depigmentation, and worsening of symptoms were all discussed with the patient  There was good understanding by the patient and they wish to proceed    Tolerated procedure well  Explained to patient over the next 4-6 hours of pain relief would be secondary to anesthetic  She was advised she may have increased pain within the next 6-24 hours due to anesthetic wearing off  During this time she was advised to apply ice and use Tylenol for pain  Informed corticosteroid will take effect in 48 hours and her pain should significantly improve  Advised if her knee becomes hot red or swollen then she should return to the clinic as soon as possible or go to local ED for evaluation

## 2020-09-17 NOTE — PROGRESS NOTES
Assessment/Plan:    Chronic pain of both knees  Risks of the injection including pain, infection, tendon rupture, progression of arthritis, fat atrophy, depigmentation, and worsening of symptoms were all discussed with the patient  There was good understanding by the patient and they wish to proceed    Tolerated procedure well  Explained to patient over the next 4-6 hours of pain relief would be secondary to anesthetic  She was advised she may have increased pain within the next 6-24 hours due to anesthetic wearing off  During this time she was advised to apply ice and use Tylenol for pain  Informed corticosteroid will take effect in 48 hours and her pain should significantly improve  Advised if her knee becomes hot red or swollen then she should return to the clinic as soon as possible or go to local ED for evaluation  Large joint arthrocentesis: R knee  Date/Time: 9/17/2020 4:10 PM  Consent given by: patient  Site marked: site marked  Timeout: Immediately prior to procedure a time out was called to verify the correct patient, procedure, equipment, support staff and site/side marked as required   Supporting Documentation  Indications: pain and joint swelling   Procedure Details  Location: knee - R knee  Needle size: 25 G  Ultrasound guidance: no  Approach: anterolateral  Medications administered: 5 mL lidocaine 1 %; 80 mg triamcinolone acetonide 40 mg/mL    Patient tolerance: patient tolerated the procedure well with no immediate complications    Risks of the injection including pain, infection, tendon rupture, progression of arthritis, fat atrophy, depigmentation, and worsening of symptoms were all discussed with the patient  There was good understanding by the patient and they wish to proceed  Diagnoses and all orders for this visit:    Chronic pain of both knees    Essential hypertension  -     lisinopril (ZESTRIL) 20 mg tablet;  Take 1 tablet (20 mg total) by mouth daily    Other orders  - Large joint arthrocentesis          Subjective:      Patient ID: Larry Munroe is a 44 y o  female  HPI    The following portions of the patient's history were reviewed and updated as appropriate: allergies, current medications, past family history, past medical history, past social history, past surgical history and problem list     Review of Systems   Constitutional: Negative for activity change, appetite change, fatigue, fever and unexpected weight change  HENT: Negative for sneezing, sore throat and tinnitus  Eyes: Negative for pain  Respiratory: Negative for cough, choking and chest tightness  Cardiovascular: Negative for chest pain, palpitations and leg swelling  Gastrointestinal: Negative for abdominal distention, abdominal pain, constipation, diarrhea, nausea and vomiting  Endocrine: Negative for polydipsia, polyphagia and polyuria  Genitourinary: Negative for dysuria and flank pain  Musculoskeletal: Positive for back pain and gait problem  Neurological: Negative for dizziness, seizures, syncope, speech difficulty, light-headedness and numbness  Psychiatric/Behavioral: Negative for agitation  Objective:      /98 (BP Location: Right arm, Patient Position: Sitting, Cuff Size: Large)   Pulse 95   Temp 98 °F (36 7 °C) (Temporal)   Resp 16   Ht 5' 2" (1 575 m)   Wt 116 kg (256 lb)   SpO2 91%   BMI 46 82 kg/m²          Physical Exam  Constitutional:       Appearance: Normal appearance  She is obese  Cardiovascular:      Rate and Rhythm: Normal rate and regular rhythm  Pulses: Normal pulses  Heart sounds: No murmur  No friction rub  Pulmonary:      Effort: Pulmonary effort is normal       Breath sounds: Normal breath sounds  No wheezing  Musculoskeletal:         General: Swelling present  Skin:     General: Skin is warm  Capillary Refill: Capillary refill takes less than 2 seconds  Neurological:      Mental Status: She is alert

## 2020-09-25 ENCOUNTER — TELEPHONE (OUTPATIENT)
Dept: FAMILY MEDICINE CLINIC | Facility: CLINIC | Age: 50
End: 2020-09-25

## 2020-10-08 ENCOUNTER — PROCEDURE VISIT (OUTPATIENT)
Dept: FAMILY MEDICINE CLINIC | Facility: CLINIC | Age: 50
End: 2020-10-08

## 2020-10-08 VITALS
BODY MASS INDEX: 46.82 KG/M2 | SYSTOLIC BLOOD PRESSURE: 146 MMHG | WEIGHT: 256 LBS | OXYGEN SATURATION: 97 % | RESPIRATION RATE: 18 BRPM | HEART RATE: 105 BPM | DIASTOLIC BLOOD PRESSURE: 98 MMHG | TEMPERATURE: 97 F

## 2020-10-08 DIAGNOSIS — H00.014 HORDEOLUM EXTERNUM OF LEFT UPPER EYELID: ICD-10-CM

## 2020-10-08 DIAGNOSIS — M17.0 PRIMARY OSTEOARTHRITIS OF BOTH KNEES: ICD-10-CM

## 2020-10-08 DIAGNOSIS — H00.012 HORDEOLUM EXTERNUM OF RIGHT LOWER EYELID: ICD-10-CM

## 2020-10-08 DIAGNOSIS — Z23 NEED FOR VACCINATION: Primary | ICD-10-CM

## 2020-10-08 PROCEDURE — 90682 RIV4 VACC RECOMBINANT DNA IM: CPT

## 2020-10-08 PROCEDURE — 90471 IMMUNIZATION ADMIN: CPT

## 2020-10-08 PROCEDURE — 20610 DRAIN/INJ JOINT/BURSA W/O US: CPT | Performed by: FAMILY MEDICINE

## 2020-10-08 RX ORDER — LIDOCAINE HYDROCHLORIDE 10 MG/ML
5 INJECTION, SOLUTION INFILTRATION; PERINEURAL
Status: COMPLETED | OUTPATIENT
Start: 2020-10-08 | End: 2020-10-08

## 2020-10-08 RX ORDER — TRIAMCINOLONE ACETONIDE 40 MG/ML
80 INJECTION, SUSPENSION INTRA-ARTICULAR; INTRAMUSCULAR
Status: COMPLETED | OUTPATIENT
Start: 2020-10-08 | End: 2020-10-08

## 2020-10-08 RX ADMIN — LIDOCAINE HYDROCHLORIDE 5 ML: 10 INJECTION, SOLUTION INFILTRATION; PERINEURAL at 15:37

## 2020-10-08 RX ADMIN — TRIAMCINOLONE ACETONIDE 80 MG: 40 INJECTION, SUSPENSION INTRA-ARTICULAR; INTRAMUSCULAR at 15:37

## 2020-10-12 ENCOUNTER — HOSPITAL ENCOUNTER (OUTPATIENT)
Dept: INFUSION CENTER | Facility: HOSPITAL | Age: 50
Discharge: HOME | End: 2020-10-12
Attending: INTERNAL MEDICINE
Payer: COMMERCIAL

## 2020-10-12 VITALS
TEMPERATURE: 97.1 F | OXYGEN SATURATION: 97 % | HEART RATE: 93 BPM | DIASTOLIC BLOOD PRESSURE: 76 MMHG | SYSTOLIC BLOOD PRESSURE: 139 MMHG | RESPIRATION RATE: 16 BRPM

## 2020-10-12 DIAGNOSIS — D50.9 IRON DEFICIENCY ANEMIA, UNSPECIFIED IRON DEFICIENCY ANEMIA TYPE: ICD-10-CM

## 2020-10-12 DIAGNOSIS — E53.8 B12 DEFICIENCY: Primary | ICD-10-CM

## 2020-10-12 PROCEDURE — 96372 THER/PROPH/DIAG INJ SC/IM: CPT

## 2020-10-12 RX ORDER — CYANOCOBALAMIN 1000 UG/ML
1000 INJECTION INTRAMUSCULAR; SUBCUTANEOUS ONCE
Status: COMPLETED | OUTPATIENT
Start: 2020-10-12 | End: 2020-10-12

## 2020-10-12 RX ORDER — CYANOCOBALAMIN 1000 UG/ML
1000 INJECTION INTRAMUSCULAR; SUBCUTANEOUS ONCE
Status: CANCELLED | OUTPATIENT
Start: 2020-11-09

## 2020-10-12 RX ADMIN — CYANOCOBALAMIN 1000 MCG: 1000 INJECTION, SOLUTION INTRAMUSCULAR at 13:24

## 2020-11-09 ENCOUNTER — HOSPITAL ENCOUNTER (OUTPATIENT)
Dept: INFUSION CENTER | Facility: HOSPITAL | Age: 50
Discharge: HOME | End: 2020-11-09
Attending: INTERNAL MEDICINE
Payer: COMMERCIAL

## 2020-11-09 VITALS
SYSTOLIC BLOOD PRESSURE: 121 MMHG | OXYGEN SATURATION: 93 % | TEMPERATURE: 97.5 F | RESPIRATION RATE: 16 BRPM | HEART RATE: 90 BPM | DIASTOLIC BLOOD PRESSURE: 89 MMHG

## 2020-11-09 DIAGNOSIS — E53.8 B12 DEFICIENCY: Primary | ICD-10-CM

## 2020-11-09 DIAGNOSIS — D50.9 IRON DEFICIENCY ANEMIA, UNSPECIFIED IRON DEFICIENCY ANEMIA TYPE: ICD-10-CM

## 2020-11-09 PROCEDURE — 96372 THER/PROPH/DIAG INJ SC/IM: CPT

## 2020-11-09 RX ORDER — CYANOCOBALAMIN 1000 UG/ML
1000 INJECTION INTRAMUSCULAR; SUBCUTANEOUS ONCE
Status: COMPLETED | OUTPATIENT
Start: 2020-11-09 | End: 2020-11-09

## 2020-11-09 RX ORDER — CYANOCOBALAMIN 1000 UG/ML
1000 INJECTION INTRAMUSCULAR; SUBCUTANEOUS ONCE
Status: CANCELLED | OUTPATIENT
Start: 2020-12-07

## 2020-11-09 RX ADMIN — CYANOCOBALAMIN 1000 MCG: 1000 INJECTION, SOLUTION INTRAMUSCULAR at 13:05

## 2020-12-04 DIAGNOSIS — I10 ESSENTIAL HYPERTENSION: ICD-10-CM

## 2020-12-04 RX ORDER — LISINOPRIL 20 MG/1
TABLET ORAL
Qty: 90 TABLET | Refills: 0 | Status: SHIPPED | OUTPATIENT
Start: 2020-12-04 | End: 2021-03-16 | Stop reason: SDUPTHER

## 2020-12-07 ENCOUNTER — HOSPITAL ENCOUNTER (OUTPATIENT)
Dept: INFUSION CENTER | Facility: HOSPITAL | Age: 50
Discharge: HOME | End: 2020-12-07
Attending: INTERNAL MEDICINE
Payer: COMMERCIAL

## 2020-12-07 VITALS
HEART RATE: 99 BPM | RESPIRATION RATE: 16 BRPM | SYSTOLIC BLOOD PRESSURE: 132 MMHG | OXYGEN SATURATION: 100 % | TEMPERATURE: 96.9 F | DIASTOLIC BLOOD PRESSURE: 89 MMHG

## 2020-12-07 DIAGNOSIS — D50.9 IRON DEFICIENCY ANEMIA, UNSPECIFIED IRON DEFICIENCY ANEMIA TYPE: ICD-10-CM

## 2020-12-07 DIAGNOSIS — E53.8 B12 DEFICIENCY: Primary | ICD-10-CM

## 2020-12-07 PROCEDURE — 96372 THER/PROPH/DIAG INJ SC/IM: CPT

## 2020-12-07 RX ORDER — CYANOCOBALAMIN 1000 UG/ML
1000 INJECTION INTRAMUSCULAR; SUBCUTANEOUS ONCE
Status: CANCELLED | OUTPATIENT
Start: 2021-01-04

## 2020-12-07 RX ORDER — CYANOCOBALAMIN 1000 UG/ML
1000 INJECTION INTRAMUSCULAR; SUBCUTANEOUS ONCE
Status: COMPLETED | OUTPATIENT
Start: 2020-12-07 | End: 2020-12-07

## 2020-12-07 RX ADMIN — CYANOCOBALAMIN 1000 MCG: 1000 INJECTION, SOLUTION INTRAMUSCULAR at 13:50

## 2020-12-22 RX ORDER — CHLORHEXIDINE GLUCONATE 0.12 MG/ML
RINSE ORAL
COMMUNITY
Start: 2020-11-12

## 2020-12-22 RX ORDER — HYDROCODONE BITARTRATE AND ACETAMINOPHEN 5; 325 MG/1; MG/1
TABLET ORAL
COMMUNITY
Start: 2020-11-12

## 2020-12-22 RX ORDER — AMOXICILLIN 500 MG/1
CAPSULE ORAL
COMMUNITY
Start: 2020-11-12

## 2020-12-23 ENCOUNTER — HOSPITAL ENCOUNTER (OUTPATIENT)
Dept: NON INVASIVE DIAGNOSTICS | Facility: HOSPITAL | Age: 50
Discharge: HOME | End: 2020-12-23
Payer: COMMERCIAL

## 2020-12-23 ENCOUNTER — OFFICE VISIT (OUTPATIENT)
Dept: FAMILY MEDICINE CLINIC | Facility: CLINIC | Age: 50
End: 2020-12-23

## 2020-12-23 VITALS
HEART RATE: 100 BPM | SYSTOLIC BLOOD PRESSURE: 112 MMHG | TEMPERATURE: 97.6 F | WEIGHT: 256 LBS | RESPIRATION RATE: 18 BRPM | HEIGHT: 62 IN | OXYGEN SATURATION: 97 % | BODY MASS INDEX: 47.11 KG/M2 | DIASTOLIC BLOOD PRESSURE: 82 MMHG

## 2020-12-23 DIAGNOSIS — M17.0 PRIMARY OSTEOARTHRITIS OF BOTH KNEES: ICD-10-CM

## 2020-12-23 DIAGNOSIS — M25.461 PAIN AND SWELLING OF KNEE, RIGHT: Primary | ICD-10-CM

## 2020-12-23 DIAGNOSIS — M25.461 PAIN AND SWELLING OF KNEE, RIGHT: ICD-10-CM

## 2020-12-23 DIAGNOSIS — E66.9 OBESITY (BMI 35.0-39.9 WITHOUT COMORBIDITY): ICD-10-CM

## 2020-12-23 DIAGNOSIS — J30.2 SEASONAL ALLERGIES: ICD-10-CM

## 2020-12-23 DIAGNOSIS — M25.561 PAIN AND SWELLING OF KNEE, RIGHT: Primary | ICD-10-CM

## 2020-12-23 DIAGNOSIS — M25.561 PAIN AND SWELLING OF KNEE, RIGHT: ICD-10-CM

## 2020-12-23 PROCEDURE — 3008F BODY MASS INDEX DOCD: CPT | Performed by: FAMILY MEDICINE

## 2020-12-23 PROCEDURE — 3079F DIAST BP 80-89 MM HG: CPT | Performed by: FAMILY MEDICINE

## 2020-12-23 PROCEDURE — 4004F PT TOBACCO SCREEN RCVD TLK: CPT | Performed by: FAMILY MEDICINE

## 2020-12-23 PROCEDURE — 3074F SYST BP LT 130 MM HG: CPT | Performed by: FAMILY MEDICINE

## 2020-12-23 PROCEDURE — 93971 EXTREMITY STUDY: CPT

## 2020-12-23 PROCEDURE — 99214 OFFICE O/P EST MOD 30 MIN: CPT | Performed by: FAMILY MEDICINE

## 2020-12-23 PROCEDURE — 3725F SCREEN DEPRESSION PERFORMED: CPT | Performed by: FAMILY MEDICINE

## 2020-12-23 RX ORDER — LORATADINE 10 MG/1
10 TABLET ORAL DAILY
Qty: 30 TABLET | Refills: 2 | Status: SHIPPED | OUTPATIENT
Start: 2020-12-23 | End: 2021-01-22

## 2020-12-23 RX ORDER — MELOXICAM 15 MG/1
15 TABLET ORAL DAILY
Qty: 30 TABLET | Refills: 0 | Status: SHIPPED | OUTPATIENT
Start: 2020-12-23 | End: 2021-01-04

## 2020-12-24 PROCEDURE — 93971 EXTREMITY STUDY: CPT | Performed by: SURGERY

## 2020-12-30 ENCOUNTER — HOSPITAL ENCOUNTER (OUTPATIENT)
Dept: ULTRASOUND IMAGING | Facility: HOSPITAL | Age: 50
Discharge: HOME | End: 2020-12-30
Payer: COMMERCIAL

## 2020-12-30 DIAGNOSIS — M25.561 PAIN AND SWELLING OF KNEE, RIGHT: ICD-10-CM

## 2020-12-30 DIAGNOSIS — M25.461 PAIN AND SWELLING OF KNEE, RIGHT: ICD-10-CM

## 2020-12-30 PROCEDURE — 76882 US LMTD JT/FCL EVL NVASC XTR: CPT

## 2020-12-30 NOTE — RESULT ENCOUNTER NOTE
I called patient to review the US extremity soft tissue finding  We discussed conservative treatment modality  I also encouraged patient to let her orthopedics surgeon know about it given that she has an appointment with them on 1/4    All of her other questions were answered to her satisfaction

## 2021-01-04 ENCOUNTER — HOSPITAL ENCOUNTER (OUTPATIENT)
Dept: INFUSION CENTER | Facility: HOSPITAL | Age: 51
Discharge: HOME | End: 2021-01-04
Attending: INTERNAL MEDICINE
Payer: COMMERCIAL

## 2021-01-04 ENCOUNTER — OFFICE VISIT (OUTPATIENT)
Dept: OBGYN CLINIC | Facility: CLINIC | Age: 51
End: 2021-01-04
Payer: COMMERCIAL

## 2021-01-04 VITALS
HEART RATE: 104 BPM | SYSTOLIC BLOOD PRESSURE: 155 MMHG | OXYGEN SATURATION: 99 % | RESPIRATION RATE: 16 BRPM | TEMPERATURE: 97.8 F | DIASTOLIC BLOOD PRESSURE: 89 MMHG

## 2021-01-04 VITALS
BODY MASS INDEX: 47.11 KG/M2 | SYSTOLIC BLOOD PRESSURE: 125 MMHG | DIASTOLIC BLOOD PRESSURE: 70 MMHG | HEIGHT: 62 IN | WEIGHT: 256 LBS

## 2021-01-04 DIAGNOSIS — E53.8 B12 DEFICIENCY: Primary | ICD-10-CM

## 2021-01-04 DIAGNOSIS — M17.0 PRIMARY OSTEOARTHRITIS OF BOTH KNEES: Primary | ICD-10-CM

## 2021-01-04 DIAGNOSIS — D50.9 IRON DEFICIENCY ANEMIA, UNSPECIFIED IRON DEFICIENCY ANEMIA TYPE: ICD-10-CM

## 2021-01-04 PROCEDURE — 96372 THER/PROPH/DIAG INJ SC/IM: CPT

## 2021-01-04 PROCEDURE — 99204 OFFICE O/P NEW MOD 45 MIN: CPT | Performed by: FAMILY MEDICINE

## 2021-01-04 RX ORDER — CYANOCOBALAMIN 1000 UG/ML
1000 INJECTION INTRAMUSCULAR; SUBCUTANEOUS ONCE
Status: COMPLETED | OUTPATIENT
Start: 2021-01-04 | End: 2021-01-04

## 2021-01-04 RX ORDER — CYANOCOBALAMIN 1000 UG/ML
1000 INJECTION INTRAMUSCULAR; SUBCUTANEOUS ONCE
Status: CANCELLED | OUTPATIENT
Start: 2021-02-01

## 2021-01-04 RX ADMIN — CYANOCOBALAMIN 1000 MCG: 1000 INJECTION, SOLUTION INTRAMUSCULAR at 12:58
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
## 2021-01-04 NOTE — PLAN OF CARE
Problem: PAIN - ADULT  Goal: Verbalizes/displays adequate comfort level or baseline comfort level  Description: Interventions:  - Encourage patient to monitor pain and request assistance  - Assess pain using appropriate pain scale  - Administer analgesics based on type and severity of pain and evaluate response  - Implement non-pharmacological measures as appropriate and evaluate response  - Consider cultural and social influences on pain and pain management  - Notify physician/advanced practitioner if interventions unsuccessful or patient reports new pain  Outcome: Progressing     Problem: INFECTION - ADULT  Goal: Absence or prevention of progression during hospitalization  Description: INTERVENTIONS:  - Assess and monitor for signs and symptoms of infection  - Monitor lab/diagnostic results  - Monitor all insertion sites, i e  indwelling lines, tubes, and drains  - Monitor endotracheal if appropriate and nasal secretions for changes in amount and color  - Nash appropriate cooling/warming therapies per order  - Administer medications as ordered  - Instruct and encourage patient and family to use good hand hygiene technique  - Identify and instruct in appropriate isolation precautions for identified infection/condition  Outcome: Progressing  Goal: Absence of fever/infection during neutropenic period  Description: INTERVENTIONS:  - Monitor WBC    Outcome: Progressing     Problem: SAFETY ADULT  Goal: Patient will remain free of falls  Description: INTERVENTIONS:  - Assess patient frequently for physical needs  -  Identify cognitive and physical deficits and behaviors that affect risk of falls    -  Nash fall precautions as indicated by assessment   - Educate patient/family on patient safety including physical limitations  - Instruct patient to call for assistance with activity based on assessment  - Modify environment to reduce risk of injury  - Consider OT/PT consult to assist with strengthening/mobility  Outcome: Progressing  Goal: Maintain or return to baseline ADL function  Description: INTERVENTIONS:  -  Assess patient's ability to carry out ADLs; assess patient's baseline for ADL function and identify physical deficits which impact ability to perform ADLs (bathing, care of mouth/teeth, toileting, grooming, dressing, etc )  - Assess/evaluate cause of self-care deficits   - Assess range of motion  - Assess patient's mobility; develop plan if impaired  - Assess patient's need for assistive devices and provide as appropriate  - Encourage maximum independence but intervene and supervise when necessary  - Involve family in performance of ADLs  - Assess for home care needs following discharge   - Consider OT consult to assist with ADL evaluation and planning for discharge  - Provide patient education as appropriate  Outcome: Progressing  Goal: Maintain or return mobility status to optimal level  Description: INTERVENTIONS:  - Assess patient's baseline mobility status (ambulation, transfers, stairs, etc )    - Identify cognitive and physical deficits and behaviors that affect mobility  - Identify mobility aids required to assist with transfers and/or ambulation (gait belt, sit-to-stand, lift, walker, cane, etc )  - [place] fall precautions as indicated by assessment  - Record patient progress and toleration of activity level on Mobility SBAR; progress patient to next Phase/Stage  - Instruct patient to call for assistance with activity based on assessment  - Consider rehabilitation consult to assist with strengthening/weightbearing, etc   Outcome: Progressing     Problem: DISCHARGE PLANNING  Goal: Discharge to home or other facility with appropriate resources  Description: INTERVENTIONS:  - Identify barriers to discharge w/patient and caregiver  - Arrange for needed discharge resources and transportation as appropriate  - Identify discharge learning needs (meds, wound care, etc )  - Arrange for interpretive services to assist at discharge as needed  - Refer to Case Management Department for coordinating discharge planning if the patient needs post-hospital services based on physician/advanced practitioner order or complex needs related to functional status, cognitive ability, or social support system  Outcome: Progressing     Problem: Knowledge Deficit  Goal: Patient/family/caregiver demonstrates understanding of disease process, treatment plan, medications, and discharge instructions  Description: Complete learning assessment and assess knowledge base    Interventions:  - Provide teaching at level of understanding  - Provide teaching via preferred learning methods  Outcome: Progressing

## 2021-01-04 NOTE — PATIENT INSTRUCTIONS
Explained the patient that she does have bilateral moderate severe medial compartment osteoarthritis as well as moderate severe osteoarthritis of the left patellofemoral joint  She has had treatment with her primary care doctor including corticosteroid injections now with only minimal relief for few weeks after injection  She is interested in evaluation for surgical intervention including knee replacement  Explained to patient that due to her age she may wish to wait longer but that I would refer her to surgeon for definitive opinion  I recommended against oral anti-inflammatory medications due to her other medicines  I recommended trial of Voltaren gel  Explained that some people do require oral anti-inflammatory medications but she is to follow-up with her primary care doctor for close follow-up of her kidney function as well as her blood pressure when taking niece routinely

## 2021-01-04 NOTE — PROGRESS NOTES
Pt here for B12 injection  VSS  One injection received into left deltoid  Next appts scheduled  Pt ambulated off unit with steady gait for d/c to home

## 2021-01-07 ENCOUNTER — OFFICE VISIT (OUTPATIENT)
Dept: OBGYN CLINIC | Facility: CLINIC | Age: 51
End: 2021-01-07
Payer: COMMERCIAL

## 2021-01-07 ENCOUNTER — TELEPHONE (OUTPATIENT)
Dept: OBGYN CLINIC | Facility: HOSPITAL | Age: 51
End: 2021-01-07

## 2021-01-07 VITALS
TEMPERATURE: 98.4 F | SYSTOLIC BLOOD PRESSURE: 124 MMHG | HEIGHT: 63 IN | WEIGHT: 260 LBS | DIASTOLIC BLOOD PRESSURE: 82 MMHG | BODY MASS INDEX: 46.07 KG/M2

## 2021-01-07 DIAGNOSIS — M17.0 PRIMARY OSTEOARTHRITIS OF BOTH KNEES: Primary | ICD-10-CM

## 2021-01-07 PROCEDURE — 4004F PT TOBACCO SCREEN RCVD TLK: CPT | Performed by: ORTHOPAEDIC SURGERY

## 2021-01-07 PROCEDURE — 3008F BODY MASS INDEX DOCD: CPT | Performed by: FAMILY MEDICINE

## 2021-01-07 PROCEDURE — 3074F SYST BP LT 130 MM HG: CPT | Performed by: ORTHOPAEDIC SURGERY

## 2021-01-07 PROCEDURE — 99214 OFFICE O/P EST MOD 30 MIN: CPT | Performed by: ORTHOPAEDIC SURGERY

## 2021-01-07 PROCEDURE — 3079F DIAST BP 80-89 MM HG: CPT | Performed by: ORTHOPAEDIC SURGERY

## 2021-01-07 PROCEDURE — 3008F BODY MASS INDEX DOCD: CPT | Performed by: ORTHOPAEDIC SURGERY

## 2021-01-07 NOTE — TELEPHONE ENCOUNTER
[facility] weight mgmt contacted today on behalf of patient and surgeon  Referral placed by Dr Rafia Salgado who saw the patient for bilateral knee arthritis, and patient would like to proceed to surgery  Pt educated in office today that her BMI is above our MLJ protocol goals for patients to proceed to surgery and we need to work on weight loss to achieve BMI less than 40 (at minimum closer to 40)  Pt made aware and agreeable to see weight mgmt  VM left for weight mgmt, advised them to contact myself or the patient directly to assist in scheduling patients appt  Surgeon will be seeing patient in 6-8 weeks in the office to check on weight loss progress and potential sign up for surgery

## 2021-01-07 NOTE — PROGRESS NOTES
Orthopaedic Surgery Note    CC: Bilateral Knee Pain      HPI:  Tonya Maloney is a 44 y  o female with a history of bilateral knee pain  She reports that this has been ongoing for 10-15 years  Over the past 6 months the pain has gotten particularly worse  She describes the pain in the right knee as worse than left  She describes the pain as aching, sharp, throbbing, sore, swelling, grinding, burning, popping, tingling, numbness, clicking  The pain is severe and she rates the pain as 10 of 10  It is worse with stairs and chores and it is better when she stays off the knees/rests  The pain is worsening over time  The pain is waking her from sleep  She has tried NSAIDs, PT, injections and none of these have provided signficant relief  She was referred by Dr Portia Cain for evaluation of her knee arthritis      ALLERGIES:  Allergies   Allergen Reactions    Codeine     Latex     Morphine     Morphine And Related     Sulfa Antibiotics        CURRENT MEDICATIONS:  Current Outpatient Medications   Medication Sig Dispense Refill    albuterol (PROVENTIL HFA,VENTOLIN HFA) 90 mcg/act inhaler Inhale 2 puffs every 6 (six) hours as needed for wheezing or shortness of breath 1 Inhaler 0    lisinopril (ZESTRIL) 20 mg tablet TAKE 1 TABLET BY MOUTH EVERY DAY 90 tablet 0    loratadine (CLARITIN) 10 mg tablet Take 1 tablet (10 mg total) by mouth daily 30 tablet 2    LORazepam (ATIVAN) 0 5 mg tablet Take 0 5 mg by mouth daily as needed  2    QUEtiapine (SEROquel) 100 mg tablet Take 100 mg by mouth daily at bedtime      venlafaxine (EFFEXOR-XR) 150 mg 24 hr capsule Take 150 mg by mouth daily      venlafaxine (EFFEXOR-XR) 75 mg 24 hr capsule Take 75 mg by mouth daily      amoxicillin (AMOXIL) 500 mg capsule TAKE 1 CAPSULE BY MOUTH 3 TIMES A DAY UNTIL GONE      benzonatate (TESSALON PERLES) 100 mg capsule Take 1 capsule (100 mg total) by mouth 3 (three) times a day as needed for cough (Patient not taking: Reported on 2021) 30 capsule 0    bisacodyl (DULCOLAX) 5 mg EC tablet Take 2 tablets (10 mg total) by mouth once for 1 dose 2 tablet 0    chlorhexidine (PERIDEX) 0 12 % solution SWISH AND SPIT 15MLS TWICE DAY      Diclofenac Sodium (VOLTAREN) 1 % Apply 2 g topically 4 (four) times a day (Patient not taking: Reported on 2021) 1 Tube 2    ferrous sulfate 325 (65 Fe) mg tablet Take 1 tablet (325 mg total) by mouth daily with breakfast 30 tablet 0    fluticasone (FLONASE) 50 mcg/act nasal spray 1 spray into each nostril daily (Patient not taking: Reported on 2021) 1 Bottle 2    guaiFENesin (ROBITUSSIN) 100 MG/5ML oral liquid Take 10 mL (200 mg total) by mouth 3 (three) times a day as needed for cough (Patient not taking: Reported on 2021) 120 mL 0    HYDROcodone-acetaminophen (NORCO) 5-325 mg per tablet TAKE 1 TABLET BY MOUTH EVERY 4 HOURS AS NEEDED FOR DISCOMFORT      ketotifen (ZADITOR) 0 025 % ophthalmic solution Administer 1 drop to both eyes 2 (two) times a day (Patient not taking: Reported on 2021) 5 mL 0    polyethylene glycol (GOLYTELY) 4000 mL solution Take 4,000 mL by mouth once for 1 dose 4000 mL 0    tobramycin in sterile water-balanced salts ophthalmic solution Apply 1-2 drops to eye every 4 (four) hours (Patient not taking: Reported on 2021) 7 mL 0    venlafaxine (EFFEXOR) 50 mg tablet Take 225 mg by mouth daily        No current facility-administered medications for this visit          PAST MEDICAL HISTORY  Past Medical History:   Diagnosis Date    Anemia     iron def with 12 weeks of infusions    Anxiety     Arthritis     knee    Asthma     Bipolar disorder (HCC)     Chronic pain disorder     knee    Hypertension     Kidney stone         Migraine     Palpitations     Pneumonia 2010    Psychiatric disorder        SURGICAL HISTORY  Past Surgical History:   Procedure Laterality Date    BREAST BIOPSY Right 2020     SECTION      CHOLECYSTECTOMY  FEMUR FRACTURE SURGERY      FRACTURE SURGERY      Treatment of fractures of both radial and ulnar shafts     FRACTURE SURGERY      Treatment pf tibial shaft fracture with plate/screws    GASTRIC BYPASS      HERNIA REPAIR      HYSTERECTOMY      SHOULDER SURGERY Right     US GUIDED BREAST BIOPSY RIGHT COMPLETE Right 3/13/2020       FAMILY HISTORY  Family History   Problem Relation Age of Onset    Diabetes Father     Hypertension Father     Diabetes Maternal Grandmother     Hypertension Maternal Grandmother     Breast cancer Maternal Aunt     Ovarian cancer Maternal Aunt     Thyroid disease Mother     Hernia Mother        SOCIAL HISTORY  Social History     Socioeconomic History    Marital status: /Civil Union     Spouse name: Not on file    Number of children: Not on file    Years of education: Not on file    Highest education level: Not on file   Occupational History    Not on file   Social Needs    Financial resource strain: Not on file    Food insecurity     Worry: Not on file     Inability: Not on file    Transportation needs     Medical: Not on file     Non-medical: Not on file   Tobacco Use    Smoking status: Current Every Day Smoker     Packs/day: 0 50    Smokeless tobacco: Never Used   Substance and Sexual Activity    Alcohol use: Not Currently    Drug use: Never    Sexual activity: Yes     Partners: Male   Lifestyle    Physical activity     Days per week: Not on file     Minutes per session: Not on file    Stress: Not on file   Relationships    Social connections     Talks on phone: Not on file     Gets together: Not on file     Attends [employer] service: Not on file     Active member of club or organization: Not on file     Attends meetings of clubs or organizations: Not on file     Relationship status: Not on file    Intimate partner violence     Fear of current or ex partner: Not on file     Emotionally abused: Not on file     Physically abused: Not on file Forced sexual activity: Not on file   Other Topics Concern    Not on file   Social History Narrative    ** Merged History Encounter **         Never drank alcohol - As per [employer]          Review of Systems   Patient indicated positive for vision problems, nose bleeds, ringing in ears, tooth pain, asthma, wheezing, SOB, heartburn, joint pain, headaches, depression, anxiety, easy bruising  Otherwise negative except per above and HPI  Physical Exam    Vitals  Vitals:    01/07/21 1210   BP: 124/82   Temp: 98 4 °F (36 9 °C)       BMI  Body mass index is 46 06 kg/m²  GENERAL: No acute distress  Alert and oriented  Well nourished and well hydrated  Appears stated age  HEENT : Normocephalic, atraumatic  Extraocular movements intact  Mask in place  NECK: Supple, trachea midline  LUNGS: Adequate and symmetric respiratory effort  No intercostal retractions or accessory muscle use  HEART: Extremities warm and perfused  ABDOMEN: Nondistended  SKIN: Warm and dry, no rash  Bilateral Knee   Inspection/Appearance:       Swelling: No      Patella is midline  Alignment:  Knee is in mild varus  Palpation - Soft Tissue: normal without effusion  ROM:       Extension - 0          Flexion - 120      extensor lag: no    Stability:  demonstrates no varus, valgus, anterior drawer or posterior drawer  Patella: stable, tracks normally  Sensation Intact to Light Touch in Sural, Saphenous, Tibial, Superficial Peroneal, and Deep Peroneal Nerve Distribution  Motor function 5 out of 5 strength in Tibialis Anterior, Gastrocnemius, Soleus, Extensor Hallucis Longus, and Flexor Hallucis Longus Muscles  Extremity Warm and Well Perfused           Imaging  A) Imaging modality available  Radiographs: yes  MRI scan: no  CT scan: no    B) Imaging findings  Subchondral cysts: no  Subchondral sclerosis: yes  Periarticular osteophytes: yes  Joint subluxation: no  Joint space narrowing: yes  Bone-on-bone articulations: yes (media)  Avascular necrosis: no    Long IM nail noted on R femur  Assessment and Plan  Bilateral Knee Arthritis      - based on radiographs she would be candidate for TKA  She has failed nonop txs as above  At this time would recommend that she work on weight loss and also smoking cessation  Would recommend that she try VS injections for temporary relief  Chelly Nurse  Petr Posadas MD  Adult Reconstruction Surgery  Department of [address] [place] [address]  12:58 PM

## 2021-01-15 DIAGNOSIS — Z98.84 STATUS POST BARIATRIC SURGERY: Primary | ICD-10-CM

## 2021-01-22 DIAGNOSIS — J30.2 SEASONAL ALLERGIES: ICD-10-CM

## 2021-01-22 RX ORDER — LORATADINE 10 MG/1
TABLET ORAL
Qty: 90 TABLET | Refills: 0 | Status: SHIPPED | OUTPATIENT
Start: 2021-01-22

## 2021-01-25 ENCOUNTER — TELEPHONE (OUTPATIENT)
Dept: FAMILY MEDICINE CLINIC | Facility: CLINIC | Age: 51
End: 2021-01-25

## 2021-01-25 NOTE — TELEPHONE ENCOUNTER
Pt called stating she fell last night at [address] and injured her left lide  Pt states she went to two different  and a hospital but no one did anything for her  Pt is requesting an order for a chest xray to make sure she did not break anything  Pt states she has a lot of pain when she breathes and or coughs   Please advise thank you

## 2021-01-27 ENCOUNTER — TELEMEDICINE (OUTPATIENT)
Dept: FAMILY MEDICINE CLINIC | Facility: CLINIC | Age: 51
End: 2021-01-27

## 2021-01-27 DIAGNOSIS — R07.81 RIB PAIN ON LEFT SIDE: Primary | ICD-10-CM

## 2021-01-27 PROCEDURE — 99213 OFFICE O/P EST LOW 20 MIN: CPT | Performed by: FAMILY MEDICINE

## 2021-01-27 RX ORDER — METHOCARBAMOL 500 MG/1
500 TABLET, FILM COATED ORAL 4 TIMES DAILY
Qty: 28 TABLET | Refills: 0 | Status: SHIPPED | OUTPATIENT
Start: 2021-01-27 | End: 2021-10-07

## 2021-01-27 RX ORDER — IBUPROFEN 800 MG/1
800 TABLET ORAL EVERY 8 HOURS SCHEDULED
Qty: 30 TABLET | Refills: 0 | Status: SHIPPED | OUTPATIENT
Start: 2021-01-27 | End: 2021-10-07

## 2021-01-27 NOTE — ASSESSMENT & PLAN NOTE
S/P fall  Likely muscular strain/possible rib fracture  Will proceed with supportive care  Encouraged RTC NSAIDS interchanged with Tylenol and muscle relaxants  Encouraged breathing techniques and wrapping chest wall with ace bandage  Push pillow against site if needing to cough  Update office with improvement

## 2021-01-27 NOTE — PROGRESS NOTES
Virtual Brief Visit    Assessment/Plan:    Problem List Items Addressed This Visit        Other    Rib pain on left side - Primary     S/P fall  Likely muscular strain/possible rib fracture  Will proceed with supportive care  Encouraged RTC NSAIDS interchanged with Tylenol and muscle relaxants  Encouraged breathing techniques and wrapping chest wall with ace bandage  Push pillow against site if needing to cough  Update office with improvement  Relevant Medications    methocarbamol (ROBAXIN) 500 mg tablet    ibuprofen (MOTRIN) 800 mg tablet                Reason for visit is   Chief Complaint   Patient presents with    Virtual Brief Visit        Encounter provider Blanca Kocher, MD    Provider located at [address] [place] [id number] [phone]    Recent Visits  Date Type Provider Dept   [facility] within past 7 days and meeting all other requirements     Today's Visits  Date Type Provider Dept   01/27/21 Telemedicine Blanca Kocher, MD  [facility]   Showing today's visits and meeting all other requirements     Future Appointments  No visits were found meeting these conditions  Showing future appointments within next 150 days and meeting all other requirements        After connecting through telephone, the patient was identified by name and date of birth  Luigi Post was informed that this is a telemedicine visit and that the visit is being conducted through telephone  My office door was closed  No one else was in the room  She acknowledged consent and understanding of privacy and security of the platform  The patient has agreed to participate and understands she can discontinue the visit at any time  Patient is aware this is a billable service  Subjective    Luigi Post is a 44 y o  female    HPI   This is a very pleasant 57-year-old female presenting for sick visit  Reports tripping and falling at local [facility] approximately 4 days ago, was evaluated by Urgent Care sent to ED and is currently home  She denies any loss of consciousness she is not on any blood nor she having any shortness of breath  Pain appears stable the worsen with inspiration, mild palpation and movement  Has been taking Tylenol with minimal relief of symptoms has not used any other medications to date  Patient is requesting radiographs informed this is not clinically necessary as it will not , verbalized understanding        Past Medical History:   Diagnosis Date    Anemia     iron def with 12 weeks of infusions    Anxiety     Arthritis     knee    Asthma     Bipolar disorder (HCC)     Chronic pain disorder     knee    Hypertension     Kidney stone         Migraine     Palpitations     Pneumonia 2010    Psychiatric disorder        Past Surgical History:   Procedure Laterality Date    BREAST BIOPSY Right 2020     SECTION      CHOLECYSTECTOMY      FEMUR FRACTURE SURGERY      FRACTURE SURGERY      Treatment of fractures of both radial and ulnar shafts     FRACTURE SURGERY      Treatment pf tibial shaft fracture with plate/screws    GASTRIC BYPASS      HERNIA REPAIR      HYSTERECTOMY      SHOULDER SURGERY Right     US GUIDED BREAST BIOPSY RIGHT COMPLETE Right 3/13/2020       Current Outpatient Medications   Medication Sig Dispense Refill    albuterol (PROVENTIL HFA,VENTOLIN HFA) 90 mcg/act inhaler Inhale 2 puffs every 6 (six) hours as needed for wheezing or shortness of breath 1 Inhaler 0    amoxicillin (AMOXIL) 500 mg capsule TAKE 1 CAPSULE BY MOUTH 3 TIMES A DAY UNTIL GONE      benzonatate (TESSALON PERLES) 100 mg capsule Take 1 capsule (100 mg total) by mouth 3 (three) times a day as needed for cough (Patient not taking: Reported on 2021) 30 capsule 0    bisacodyl (DULCOLAX) 5 mg EC tablet Take 2 tablets (10 mg total) by mouth once for 1 dose 2 tablet 0    chlorhexidine (PERIDEX) 0 12 % solution SWISH AND SPIT 15MLS TWICE DAY      Diclofenac Sodium (VOLTAREN) 1 % Apply 2 g topically 4 (four) times a day (Patient not taking: Reported on 1/7/2021) 1 Tube 2    ferrous sulfate 325 (65 Fe) mg tablet Take 1 tablet (325 mg total) by mouth daily with breakfast 30 tablet 0    fluticasone (FLONASE) 50 mcg/act nasal spray 1 spray into each nostril daily (Patient not taking: Reported on 1/7/2021) 1 Bottle 2    guaiFENesin (ROBITUSSIN) 100 MG/5ML oral liquid Take 10 mL (200 mg total) by mouth 3 (three) times a day as needed for cough (Patient not taking: Reported on 1/7/2021) 120 mL 0    HYDROcodone-acetaminophen (NORCO) 5-325 mg per tablet TAKE 1 TABLET BY MOUTH EVERY 4 HOURS AS NEEDED FOR DISCOMFORT      ibuprofen (MOTRIN) 800 mg tablet Take 1 tablet (800 mg total) by mouth every 8 (eight) hours 30 tablet 0    ketotifen (ZADITOR) 0 025 % ophthalmic solution Administer 1 drop to both eyes 2 (two) times a day (Patient not taking: Reported on 1/7/2021) 5 mL 0    lisinopril (ZESTRIL) 20 mg tablet TAKE 1 TABLET BY MOUTH EVERY DAY 90 tablet 0    loratadine (CLARITIN) 10 mg tablet TAKE 1 TABLET BY MOUTH EVERY DAY 90 tablet 0    LORazepam (ATIVAN) 0 5 mg tablet Take 0 5 mg by mouth daily as needed  2    methocarbamol (ROBAXIN) 500 mg tablet Take 1 tablet (500 mg total) by mouth 4 (four) times a day 28 tablet 0    polyethylene glycol (GOLYTELY) 4000 mL solution Take 4,000 mL by mouth once for 1 dose 4000 mL 0    QUEtiapine (SEROquel) 100 mg tablet Take 100 mg by mouth daily at bedtime      tobramycin in sterile water-balanced salts ophthalmic solution Apply 1-2 drops to eye every 4 (four) hours (Patient not taking: Reported on 1/7/2021) 7 mL 0    venlafaxine (EFFEXOR) 50 mg tablet Take 225 mg by mouth daily       venlafaxine (EFFEXOR-XR) 150 mg 24 hr capsule Take 150 mg by mouth daily      venlafaxine (EFFEXOR-XR) 75 mg 24 hr capsule Take 75 mg by mouth daily       No current facility-administered medications for this visit  Allergies   Allergen Reactions    Codeine     Latex     Morphine     Morphine And Related     Sulfa Antibiotics        Review of Systems   Constitutional: Negative for activity change, appetite change, fatigue, fever and unexpected weight change  HENT: Negative for sneezing, sore throat and tinnitus  Eyes: Negative for pain  Respiratory: Negative for cough, choking and chest tightness  Cardiovascular: Positive for chest pain  Negative for palpitations and leg swelling  Gastrointestinal: Negative for abdominal distention, abdominal pain, constipation, diarrhea, nausea and vomiting  Endocrine: Negative for polydipsia, polyphagia and polyuria  Genitourinary: Negative for dysuria and flank pain  Musculoskeletal: Positive for back pain  Neurological: Negative for dizziness, seizures, syncope, speech difficulty, light-headedness and numbness  Psychiatric/Behavioral: Negative for agitation  There were no vitals filed for this visit  I spent 20 minutes directly with the patient during this visit    VIRTUAL VISIT [address]  acknowledges that she has consented to an online visit or consultation  She understands that the online visit is based solely on information provided by her, and that, in the absence of a face-to-face physical evaluation by the physician, the diagnosis she receives is both limited and provisional in terms of accuracy and completeness  This is not intended to replace a full medical face-to-face evaluation by the physician  Elo Cheema understands and accepts these terms

## 2021-02-01 ENCOUNTER — HOSPITAL ENCOUNTER (OUTPATIENT)
Dept: INFUSION CENTER | Facility: HOSPITAL | Age: 51
End: 2021-02-01
Attending: INTERNAL MEDICINE

## 2021-02-05 ENCOUNTER — HOSPITAL ENCOUNTER (OUTPATIENT)
Dept: INFUSION CENTER | Facility: HOSPITAL | Age: 51
Discharge: HOME | End: 2021-02-05
Attending: INTERNAL MEDICINE
Payer: COMMERCIAL

## 2021-02-05 VITALS
RESPIRATION RATE: 16 BRPM | HEART RATE: 94 BPM | OXYGEN SATURATION: 97 % | TEMPERATURE: 97.8 F | DIASTOLIC BLOOD PRESSURE: 98 MMHG | SYSTOLIC BLOOD PRESSURE: 146 MMHG

## 2021-02-05 DIAGNOSIS — E53.8 B12 DEFICIENCY: Primary | ICD-10-CM

## 2021-02-05 DIAGNOSIS — D50.9 IRON DEFICIENCY ANEMIA, UNSPECIFIED IRON DEFICIENCY ANEMIA TYPE: ICD-10-CM

## 2021-02-05 PROCEDURE — 96372 THER/PROPH/DIAG INJ SC/IM: CPT

## 2021-02-05 RX ORDER — CYANOCOBALAMIN 1000 UG/ML
1000 INJECTION INTRAMUSCULAR; SUBCUTANEOUS ONCE
Status: CANCELLED | OUTPATIENT
Start: 2021-03-05

## 2021-02-05 RX ORDER — CYANOCOBALAMIN 1000 UG/ML
1000 INJECTION INTRAMUSCULAR; SUBCUTANEOUS ONCE
Status: COMPLETED | OUTPATIENT
Start: 2021-02-05 | End: 2021-02-05

## 2021-02-05 RX ADMIN — CYANOCOBALAMIN 1000 MCG: 1000 INJECTION, SOLUTION INTRAMUSCULAR at 13:06

## 2021-02-05 NOTE — PROGRESS NOTES
Patient received B12 injection in left deltoid  No complications, refused AVS, left unit ambulatory with steady gait

## 2021-02-11 ENCOUNTER — OFFICE VISIT (OUTPATIENT)
Dept: OBGYN CLINIC | Facility: CLINIC | Age: 51
End: 2021-02-11
Payer: COMMERCIAL

## 2021-02-11 VITALS
BODY MASS INDEX: 45.54 KG/M2 | SYSTOLIC BLOOD PRESSURE: 132 MMHG | DIASTOLIC BLOOD PRESSURE: 84 MMHG | TEMPERATURE: 97.9 F | WEIGHT: 257 LBS | HEIGHT: 63 IN

## 2021-02-11 DIAGNOSIS — M17.11 PRIMARY OSTEOARTHRITIS OF RIGHT KNEE: Primary | ICD-10-CM

## 2021-02-11 DIAGNOSIS — M17.12 PRIMARY OSTEOARTHRITIS OF LEFT KNEE: ICD-10-CM

## 2021-02-11 PROCEDURE — 20610 DRAIN/INJ JOINT/BURSA W/O US: CPT | Performed by: ORTHOPAEDIC SURGERY

## 2021-02-11 NOTE — PROGRESS NOTES
Tipurvi Dagoberto presents for R knee VS injection  Large joint arthrocentesis: R knee  Universal Protocol:  Consent given by: patient  Time out: Immediately prior to procedure a "time out" was called to verify the correct patient, procedure, equipment, support staff and site/side marked as required  Site marked: the operative site was marked  Supporting Documentation  Indications: pain   Procedure Details  Location: knee - R knee  Needle size: 20 G  Approach: anterior  Medications administered: 3 mL sodium hyaluronate 60 MG/3ML    Patient tolerance: patient tolerated the procedure well with no immediate complications  Dressing:  Sterile dressing applied          Rigoberto Rios MD  Adult Reconstruction  Department [address]  02/11/21  3:35 PM

## 2021-02-27 ENCOUNTER — LAB (OUTPATIENT)
Dept: LAB | Facility: HOSPITAL | Age: 51
End: 2021-02-27
Attending: INTERNAL MEDICINE
Payer: COMMERCIAL

## 2021-02-27 DIAGNOSIS — E53.8 B12 DEFICIENCY: ICD-10-CM

## 2021-02-27 LAB
ALBUMIN SERPL BCP-MCNC: 3.3 G/DL (ref 3.5–5)
ALP SERPL-CCNC: 134 U/L (ref 46–116)
ALT SERPL W P-5'-P-CCNC: 16 U/L (ref 12–78)
ANION GAP SERPL CALCULATED.3IONS-SCNC: 5 MMOL/L (ref 4–13)
AST SERPL W P-5'-P-CCNC: 16 U/L (ref 5–45)
BASOPHILS # BLD AUTO: 0.08 THOUSANDS/ΜL (ref 0–0.1)
BASOPHILS NFR BLD AUTO: 1 % (ref 0–1)
BILIRUB SERPL-MCNC: 0.3 MG/DL (ref 0.2–1)
BUN SERPL-MCNC: 18 MG/DL (ref 5–25)
CALCIUM ALBUM COR SERPL-MCNC: 9.5 MG/DL (ref 8.3–10.1)
CALCIUM SERPL-MCNC: 8.9 MG/DL (ref 8.3–10.1)
CHLORIDE SERPL-SCNC: 105 MMOL/L (ref 100–108)
CO2 SERPL-SCNC: 29 MMOL/L (ref 21–32)
CREAT SERPL-MCNC: 0.72 MG/DL (ref 0.6–1.3)
EOSINOPHIL # BLD AUTO: 0.25 THOUSAND/ΜL (ref 0–0.61)
EOSINOPHIL NFR BLD AUTO: 3 % (ref 0–6)
ERYTHROCYTE [DISTWIDTH] IN BLOOD BY AUTOMATED COUNT: 12.8 % (ref 11.6–15.1)
GFR SERPL CREATININE-BSD FRML MDRD: 97 ML/MIN/1.73SQ M
GLUCOSE P FAST SERPL-MCNC: 86 MG/DL (ref 65–99)
HCT VFR BLD AUTO: 48.9 % (ref 34.8–46.1)
HGB BLD-MCNC: 15.3 G/DL (ref 11.5–15.4)
IMM GRANULOCYTES # BLD AUTO: 0.03 THOUSAND/UL (ref 0–0.2)
IMM GRANULOCYTES NFR BLD AUTO: 0 % (ref 0–2)
LYMPHOCYTES # BLD AUTO: 1.9 THOUSANDS/ΜL (ref 0.6–4.47)
LYMPHOCYTES NFR BLD AUTO: 26 % (ref 14–44)
MCH RBC QN AUTO: 30.4 PG (ref 26.8–34.3)
MCHC RBC AUTO-ENTMCNC: 31.3 G/DL (ref 31.4–37.4)
MCV RBC AUTO: 97 FL (ref 82–98)
MONOCYTES # BLD AUTO: 0.71 THOUSAND/ΜL (ref 0.17–1.22)
MONOCYTES NFR BLD AUTO: 10 % (ref 4–12)
NEUTROPHILS # BLD AUTO: 4.47 THOUSANDS/ΜL (ref 1.85–7.62)
NEUTS SEG NFR BLD AUTO: 60 % (ref 43–75)
NRBC BLD AUTO-RTO: 0 /100 WBCS
PLATELET # BLD AUTO: 250 THOUSANDS/UL (ref 149–390)
PMV BLD AUTO: 9.4 FL (ref 8.9–12.7)
POTASSIUM SERPL-SCNC: 4.2 MMOL/L (ref 3.5–5.3)
PROT SERPL-MCNC: 7 G/DL (ref 6.4–8.2)
RBC # BLD AUTO: 5.04 MILLION/UL (ref 3.81–5.12)
SODIUM SERPL-SCNC: 139 MMOL/L (ref 136–145)
WBC # BLD AUTO: 7.44 THOUSAND/UL (ref 4.31–10.16)

## 2021-02-27 PROCEDURE — 83550 IRON BINDING TEST: CPT

## 2021-02-27 PROCEDURE — 85025 COMPLETE CBC W/AUTO DIFF WBC: CPT

## 2021-02-27 PROCEDURE — 80053 COMPREHEN METABOLIC PANEL: CPT

## 2021-02-27 PROCEDURE — 82728 ASSAY OF FERRITIN: CPT

## 2021-02-27 PROCEDURE — 83540 ASSAY OF IRON: CPT

## 2021-02-27 PROCEDURE — 36415 COLL VENOUS BLD VENIPUNCTURE: CPT

## 2021-02-27 PROCEDURE — 83918 ORGANIC ACIDS TOTAL QUANT: CPT

## 2021-02-28 LAB
FERRITIN SERPL-MCNC: 31 NG/ML (ref 8–388)
IRON SATN MFR SERPL: 20 %
IRON SERPL-MCNC: 70 UG/DL (ref 50–170)
TIBC SERPL-MCNC: 350 UG/DL (ref 250–450)

## 2021-03-01 ENCOUNTER — HOSPITAL ENCOUNTER (OUTPATIENT)
Dept: INFUSION CENTER | Facility: HOSPITAL | Age: 51
Discharge: HOME | End: 2021-03-01
Attending: INTERNAL MEDICINE
Payer: COMMERCIAL

## 2021-03-01 VITALS
HEART RATE: 102 BPM | SYSTOLIC BLOOD PRESSURE: 123 MMHG | RESPIRATION RATE: 18 BRPM | DIASTOLIC BLOOD PRESSURE: 85 MMHG | OXYGEN SATURATION: 95 % | TEMPERATURE: 97.1 F

## 2021-03-01 DIAGNOSIS — E53.8 B12 DEFICIENCY: Primary | ICD-10-CM

## 2021-03-01 DIAGNOSIS — D50.9 IRON DEFICIENCY ANEMIA, UNSPECIFIED IRON DEFICIENCY ANEMIA TYPE: ICD-10-CM

## 2021-03-01 PROCEDURE — 96372 THER/PROPH/DIAG INJ SC/IM: CPT

## 2021-03-01 RX ORDER — CYANOCOBALAMIN 1000 UG/ML
1000 INJECTION INTRAMUSCULAR; SUBCUTANEOUS ONCE
Status: CANCELLED | OUTPATIENT
Start: 2021-03-29

## 2021-03-01 RX ORDER — CYANOCOBALAMIN 1000 UG/ML
1000 INJECTION INTRAMUSCULAR; SUBCUTANEOUS ONCE
Status: COMPLETED | OUTPATIENT
Start: 2021-03-01 | End: 2021-03-01

## 2021-03-01 RX ADMIN — CYANOCOBALAMIN 1000 MCG: 1000 INJECTION, SOLUTION INTRAMUSCULAR at 13:02

## 2021-03-01 NOTE — PROGRESS NOTES
Pt tolerated B12 IM in left deltoid with no adverse reactions  Pt then d/c'd home ambulatory with steady gait

## 2021-03-03 ENCOUNTER — OFFICE VISIT (OUTPATIENT)
Dept: HEMATOLOGY ONCOLOGY | Facility: HOSPITAL | Age: 51
End: 2021-03-03
Payer: COMMERCIAL

## 2021-03-03 VITALS
OXYGEN SATURATION: 95 % | DIASTOLIC BLOOD PRESSURE: 88 MMHG | SYSTOLIC BLOOD PRESSURE: 128 MMHG | WEIGHT: 259 LBS | HEART RATE: 124 BPM | RESPIRATION RATE: 16 BRPM | HEIGHT: 63 IN | BODY MASS INDEX: 45.89 KG/M2 | TEMPERATURE: 97.9 F

## 2021-03-03 DIAGNOSIS — D50.9 IRON DEFICIENCY ANEMIA, UNSPECIFIED IRON DEFICIENCY ANEMIA TYPE: ICD-10-CM

## 2021-03-03 DIAGNOSIS — E53.8 B12 DEFICIENCY: Primary | ICD-10-CM

## 2021-03-03 PROCEDURE — 99214 OFFICE O/P EST MOD 30 MIN: CPT | Performed by: INTERNAL MEDICINE

## 2021-03-03 PROCEDURE — 4004F PT TOBACCO SCREEN RCVD TLK: CPT | Performed by: INTERNAL MEDICINE

## 2021-03-03 NOTE — PROGRESS NOTES
Hematology/Oncology Outpatient Follow- up Note  Christian Nieto 44 y o  female MRN: @ Encounter: [id number]        Date:  3/3/2021    Presenting Complaint/Diagnosis : Iron deficiency anemia    HPI:    Corrinne Slimmer seen for initial consultation 12/18/2019 regarding iron deficiency anemia   She was seen in the emergency room on 11/22/2019 with a hemoglobin of 6 2, MCV 68  She did have iron studies that reflected iron saturation 3%   She did not have any repeat blood work after she was seen in the emergency room  [facility] and platelet counts are normal   Previous blood work from February 2017 reflects a normal hemoglobin running in the 14 range   I do not have blood work in between 2017 and 2019       She does report a past medical history significant for gastric bypass in 2003 and also reports a partial hysterectomy in 2006  She has not had any vaginal bleeding since the 2006       Previous Hematologic/ Oncologic History:    Workup  Venofer for 10 doses    Current Hematologic/ Oncologic Treatment:    Observation    Interval History:      The patient returns for follow-up visit  Her iron studies are normal and her hemoglobin is above 15  MMA is pending  She still feels fatigued  We will continue her B12 with no changes  She will get this once a month  Denies any nausea denies any vomiting denies any diarrhea  Apart from fatigue the rest of her 14 point review of systems today was negative  Test Results:    Imaging: No results found      Labs:   Lab Results   Component Value Date    WBC 7 44 02/27/2021    HGB 15 3 02/27/2021    HCT 48 9 (H) 02/27/2021    MCV 97 02/27/2021     02/27/2021     Lab Results   Component Value Date     08/25/2014    K 4 2 02/27/2021     02/27/2021    CO2 29 02/27/2021    ANIONGAP 6 08/25/2014    BUN 18 02/27/2021    CREATININE 0 72 02/27/2021    GLUCOSE 96 08/25/2014    GLUF 86 02/27/2021    CALCIUM 8 9 02/27/2021    CORRECTEDCA 9 5 02/27/2021    AST 16 2021    ALT 16 2021    ALKPHOS 134 (H) 2021    PROT 7 4 2014    BILITOT 0 42 2014    EGFR 97 2021     Lab Results   Component Value Date    IRON 70 2021    TIBC 350 2021    FERRITIN 31 2021   ROS: As stated in the history of present illness otherwise his 14 point review of systems today was negative        Active Problems:   Patient Active Problem List   Diagnosis    Onychomycosis    Obesity (BMI 35 0-39 9 without comorbidity)    Iron deficiency anemia, unspecified    Chronic pain of both knees    Hordeolum externum of left upper eyelid    Primary osteoarthritis of both knees    Seasonal allergies    B12 deficiency    Pain and swelling of knee, right    Rib pain on left side       Past Medical History:   Past Medical History:   Diagnosis Date    Anemia 2020    iron def with 12 weeks of infusions    Anxiety     Arthritis     knee    Asthma     Bipolar disorder ([address] )     Chronic pain disorder     knee    Hypertension     Kidney stone         Migraine     Palpitations     Pneumonia 2010    Psychiatric disorder        Surgical History:   Past Surgical History:   Procedure Laterality Date    BREAST BIOPSY Right 2020     SECTION      CHOLECYSTECTOMY      FEMUR FRACTURE SURGERY      FRACTURE SURGERY      Treatment of fractures of both radial and ulnar shafts     FRACTURE SURGERY      Treatment pf tibial shaft fracture with plate/screws    GASTRIC BYPASS      HERNIA REPAIR      HYSTERECTOMY      SHOULDER SURGERY Right     US GUIDED BREAST BIOPSY RIGHT COMPLETE Right 3/13/2020       Family History:    Family History   Problem Relation Age of Onset    Diabetes Father     Hypertension Father     Diabetes Maternal Grandmother     Hypertension Maternal Grandmother     Breast cancer Maternal Aunt     Ovarian cancer Maternal Aunt     Thyroid disease Mother     Hernia Mother        Cancer-related family history includes Breast cancer in her maternal aunt; Ovarian cancer in her maternal aunt      Social History:   Social History     Socioeconomic History    Marital status: /Civil Union     Spouse name: Not on file    Number of children: Not on file    Years of education: Not on file    Highest education level: Not on file   Occupational History    Not on file   Social Needs    Financial resource strain: Not on file    Food insecurity     Worry: Not on file     Inability: Not on file   [employer] needs     Medical: Not on file     Non-medical: Not on file   Tobacco Use    Smoking status: Current Every Day Smoker     Packs/day: 0 50    Smokeless tobacco: Never Used   Substance and Sexual Activity    Alcohol use: Not Currently    Drug use: Never    Sexual activity: Yes     Partners: Male   Lifestyle    Physical activity     Days per week: Not on file     Minutes per session: Not on file    Stress: Not on file   Relationships    Social connections     Talks on phone: Not on file     Gets together: Not on file     Attends Orthodox service: Not on file     Active member of club or organization: Not on file     Attends meetings of clubs or organizations: Not on file     Relationship status: Not on file    Intimate partner violence     Fear of current or ex partner: Not on file     Emotionally abused: Not on file     Physically abused: Not on file     Forced sexual activity: Not on file   Other Topics Concern    Not on file   Social History Narrative    ** Merged History Encounter **         Never drank alcohol - As per [employer]        Current Medications:   Current Outpatient Medications   Medication Sig Dispense Refill    albuterol (PROVENTIL HFA,VENTOLIN HFA) 90 mcg/act inhaler Inhale 2 puffs every 6 (six) hours as needed for wheezing or shortness of breath 1 Inhaler 0    amoxicillin (AMOXIL) 500 mg capsule TAKE 1 CAPSULE BY MOUTH 3 TIMES A DAY UNTIL GONE      benzonatate (TESSALON PERLES) 100 mg capsule Take 1 capsule (100 mg total) by mouth 3 (three) times a day as needed for cough 30 capsule 0    chlorhexidine (PERIDEX) 0 12 % solution SWISH AND SPIT 15MLS TWICE DAY      Diclofenac Sodium (VOLTAREN) 1 % Apply 2 g topically 4 (four) times a day 1 Tube 2    fluticasone (FLONASE) 50 mcg/act nasal spray 1 spray into each nostril daily 1 Bottle 2    guaiFENesin (ROBITUSSIN) 100 MG/5ML oral liquid Take 10 mL (200 mg total) by mouth 3 (three) times a day as needed for cough 120 mL 0    HYDROcodone-acetaminophen (NORCO) 5-325 mg per tablet TAKE 1 TABLET BY MOUTH EVERY 4 HOURS AS NEEDED FOR DISCOMFORT      ibuprofen (MOTRIN) 800 mg tablet Take 1 tablet (800 mg total) by mouth every 8 (eight) hours 30 tablet 0    ketotifen (ZADITOR) 0 025 % ophthalmic solution Administer 1 drop to both eyes 2 (two) times a day 5 mL 0    lisinopril (ZESTRIL) 20 mg tablet TAKE 1 TABLET BY MOUTH EVERY DAY 90 tablet 0    loratadine (CLARITIN) 10 mg tablet TAKE 1 TABLET BY MOUTH EVERY DAY 90 tablet 0    LORazepam (ATIVAN) 0 5 mg tablet Take 0 5 mg by mouth daily as needed  2    methocarbamol (ROBAXIN) 500 mg tablet Take 1 tablet (500 mg total) by mouth 4 (four) times a day 28 tablet 0    QUEtiapine (SEROquel) 100 mg tablet Take 100 mg by mouth daily at bedtime      tobramycin in sterile water-balanced salts ophthalmic solution Apply 1-2 drops to eye every 4 (four) hours 7 mL 0    venlafaxine (EFFEXOR) 50 mg tablet Take 225 mg by mouth daily       venlafaxine (EFFEXOR-XR) 150 mg 24 hr capsule Take 150 mg by mouth daily      venlafaxine (EFFEXOR-XR) 75 mg 24 hr capsule Take 75 mg by mouth daily      bisacodyl (DULCOLAX) 5 mg EC tablet Take 2 tablets (10 mg total) by mouth once for 1 dose 2 tablet 0    ferrous sulfate 325 (65 Fe) mg tablet Take 1 tablet (325 mg total) by mouth daily with breakfast 30 tablet 0    polyethylene glycol (GOLYTELY) 4000 mL solution Take 4,000 mL by mouth once for 1 dose 4000 mL 0     No current facility-administered medications for this visit  Allergies: Allergies   Allergen Reactions    Codeine     Latex     Morphine     Morphine And Related     Sulfa Antibiotics        Physical Exam:    Body surface area is 2 15 meters squared  Wt Readings from Last 3 Encounters:   03/03/21 117 kg (259 lb)   02/11/21 117 kg (257 lb)   01/07/21 118 kg (260 lb)        Temp Readings from Last 3 Encounters:   03/03/21 97 9 °F (36 6 °C) (Temporal)   03/01/21 (!) 97 1 °F (36 2 °C) (Temporal)   02/11/21 97 9 °F (36 6 °C)        BP Readings from Last 3 Encounters:   03/03/21 128/88   03/01/21 123/85   02/11/21 132/84         Pulse Readings from Last 3 Encounters:   03/03/21 (!) 124   03/01/21 102   02/05/21 94        Physical Exam     Constitutional   General appearance: No acute distress, well appearing and well nourished  Eyes   Conjunctiva and lids: No swelling, erythema or discharge  Pupils and irises: Equal, round and reactive to light  Ears, Nose, Mouth, and Throat   External inspection of ears and nose: Normal     Nasal mucosa, septum, and turbinates: Normal without edema or erythema  Oropharynx: Normal with no erythema, edema, exudate or lesions  Pulmonary   Respiratory effort: No increased work of breathing or signs of respiratory distress  Auscultation of lungs: Clear to auscultation  Cardiovascular   Palpation of heart: Normal PMI, no thrills  Auscultation of heart: Normal rate and rhythm, normal S1 and S2, without murmurs  Examination of extremities for edema and/or varicosities: Normal     Carotid pulses: Normal     Abdomen   Abdomen: Non-tender, no masses  Liver and spleen: No hepatomegaly or splenomegaly  Lymphatic   Palpation of lymph nodes in neck: No lymphadenopathy  Musculoskeletal   Gait and station: Normal     Digits and nails: Normal without clubbing or cyanosis      Inspection/palpation of joints, bones, and muscles: Normal     Skin   Skin and subcutaneous tissue: Normal without rashes or lesions  Neurologic   Cranial nerves: Cranial nerves 2-12 intact  Sensation: No sensory loss  Psychiatric   Orientation to person, place, and time: Normal     Mood and affect: Normal         Assessment / Plan:    The patient is a pleasant 25-year-old female with a past medical history of gastric bypass who initially presented with iron deficiency anemia  She was treated with 10 doses of Venofer  Her iron studies are now normal   MMA is pending  At her last visit was elevated so we put her on monthly B12  She wishes to stay on this as she is still quite fatigued  I think this is reasonable  I will see her back in 6 months with repeat blood work  She will get B12 once a month  MMA result has not been resulted yet  If her MMA does not normalize by her next visit we will increase the frequency of the B12 shots  I explained to her she should talk to her primary care physician about her fatigue  Again with a normal hemoglobin it is less likely this could be related to hematologic issues and she should consider thyroid testing etc     Patient agrees with this  I will see her back in 6 months  She will stay on B12 once a month  Goals and Barriers:  Current Goal:  Prolong Survival from B12 deficiency  Barriers: None  Patient's Capacity to Self Care:  Patient able to self care  Portions of the record may have been created with voice recognition software   Occasional wrong word or "sound a like" substitutions may have occurred due to the inherent limitations of voice recognition software   Read the chart carefully and recognize, using context, where substitutions have occurred

## 2021-03-06 LAB
METHYLMALONATE SERPL-SCNC: 148 NMOL/L (ref 0–378)
SL AMB DISCLAIMER: NORMAL

## 2021-03-11 ENCOUNTER — OFFICE VISIT (OUTPATIENT)
Dept: OBGYN CLINIC | Facility: CLINIC | Age: 51
End: 2021-03-11
Payer: COMMERCIAL

## 2021-03-11 VITALS
HEIGHT: 63 IN | WEIGHT: 259 LBS | TEMPERATURE: 98.1 F | DIASTOLIC BLOOD PRESSURE: 82 MMHG | BODY MASS INDEX: 45.89 KG/M2 | SYSTOLIC BLOOD PRESSURE: 122 MMHG

## 2021-03-11 DIAGNOSIS — M17.12 PRIMARY OSTEOARTHRITIS OF LEFT KNEE: Primary | ICD-10-CM

## 2021-03-11 PROCEDURE — 20610 DRAIN/INJ JOINT/BURSA W/O US: CPT | Performed by: ORTHOPAEDIC SURGERY

## 2021-03-11 PROCEDURE — 3008F BODY MASS INDEX DOCD: CPT | Performed by: FAMILY MEDICINE

## 2021-03-11 PROCEDURE — 3008F BODY MASS INDEX DOCD: CPT | Performed by: INTERNAL MEDICINE

## 2021-03-11 NOTE — PROGRESS NOTES
Large joint arthrocentesis: L knee  Universal Protocol:  Consent given by: patient  Time out: Immediately prior to procedure a "time out" was called to verify the correct patient, procedure, equipment, support staff and site/side marked as required    Site marked: the operative site was marked  Supporting Documentation  Indications: pain   Procedure Details  Location: knee - L knee  Needle size: 20 G  Approach: anterior  Medications administered: 3 mL sodium hyaluronate 60 MG/3ML    Patient tolerance: patient tolerated the procedure well with no immediate complications  Dressing:  Sterile dressing applied

## 2021-03-16 DIAGNOSIS — I10 ESSENTIAL HYPERTENSION: ICD-10-CM

## 2021-03-16 RX ORDER — LISINOPRIL 20 MG/1
20 TABLET ORAL DAILY
Qty: 90 TABLET | Refills: 0 | Status: SHIPPED | OUTPATIENT
Start: 2021-03-16 | End: 2021-06-10

## 2021-03-26 ENCOUNTER — TELEPHONE (OUTPATIENT)
Dept: FAMILY MEDICINE CLINIC | Facility: CLINIC | Age: 51
End: 2021-03-26

## 2021-03-26 NOTE — TELEPHONE ENCOUNTER
SIGNATURES NEEDED FOR [facility]  FORM RECEIVED VIA FAX  WILL BE PLACED IN YOUR BIN AT ASSIGNED DELIVERY TIMES

## 2021-03-29 ENCOUNTER — HOSPITAL ENCOUNTER (OUTPATIENT)
Dept: INFUSION CENTER | Facility: HOSPITAL | Age: 51
Discharge: HOME | End: 2021-03-29
Attending: INTERNAL MEDICINE
Payer: COMMERCIAL

## 2021-03-29 VITALS
HEART RATE: 93 BPM | SYSTOLIC BLOOD PRESSURE: 122 MMHG | TEMPERATURE: 98.3 F | OXYGEN SATURATION: 98 % | RESPIRATION RATE: 16 BRPM | DIASTOLIC BLOOD PRESSURE: 93 MMHG

## 2021-03-29 DIAGNOSIS — D50.9 IRON DEFICIENCY ANEMIA, UNSPECIFIED IRON DEFICIENCY ANEMIA TYPE: ICD-10-CM

## 2021-03-29 DIAGNOSIS — E53.8 B12 DEFICIENCY: Primary | ICD-10-CM

## 2021-03-29 PROCEDURE — 96372 THER/PROPH/DIAG INJ SC/IM: CPT

## 2021-03-29 RX ORDER — CYANOCOBALAMIN 1000 UG/ML
1000 INJECTION INTRAMUSCULAR; SUBCUTANEOUS ONCE
Status: COMPLETED | OUTPATIENT
Start: 2021-03-29 | End: 2021-03-29

## 2021-03-29 RX ORDER — CYANOCOBALAMIN 1000 UG/ML
1000 INJECTION INTRAMUSCULAR; SUBCUTANEOUS ONCE
Status: CANCELLED | OUTPATIENT
Start: 2021-04-26

## 2021-03-29 RX ADMIN — CYANOCOBALAMIN 1000 MCG: 1000 INJECTION, SOLUTION INTRAMUSCULAR at 13:06

## 2021-03-29 NOTE — PROGRESS NOTES
Pt received B12 injection into L deltoid  No complications  Band aid applied  Next appts scheduled  Pt ambulated off unit with steady gait for d/c to home

## 2021-03-29 NOTE — PLAN OF CARE
Problem: PAIN - ADULT  Goal: Verbalizes/displays adequate comfort level or baseline comfort level  Description: Interventions:  - Encourage patient to monitor pain and request assistance  - Assess pain using appropriate pain scale  - Administer analgesics based on type and severity of pain and evaluate response  - Implement non-pharmacological measures as appropriate and evaluate response  - Consider cultural and social influences on pain and pain management  - Notify physician/advanced practitioner if interventions unsuccessful or patient reports new pain  Outcome: Progressing     Problem: INFECTION - ADULT  Goal: Absence or prevention of progression during hospitalization  Description: INTERVENTIONS:  - Assess and monitor for signs and symptoms of infection  - Monitor lab/diagnostic results  - Monitor all insertion sites, i e  indwelling lines, tubes, and drains  - Monitor endotracheal if appropriate and nasal secretions for changes in amount and color  - [place] appropriate cooling/warming therapies per order  - Administer medications as ordered  - Instruct and encourage patient and family to use good hand hygiene technique  - Identify and instruct in appropriate isolation precautions for identified infection/condition  Outcome: Progressing  Goal: Absence of fever/infection during neutropenic period  Description: INTERVENTIONS:  - Monitor WBC    Outcome: Progressing     Problem: SAFETY ADULT  Goal: Patient will remain free of falls  Description: INTERVENTIONS:  - Assess patient frequently for physical needs  -  Identify cognitive and physical deficits and behaviors that affect risk of falls    -  [place] fall precautions as indicated by assessment   - Educate patient/family on patient safety including physical limitations  - Instruct patient to call for assistance with activity based on assessment  - Modify environment to reduce risk of injury  - Consider OT/PT consult to assist with strengthening/mobility  Outcome: Progressing  Goal: Maintain or return to baseline ADL function  Description: INTERVENTIONS:  -  Assess patient's ability to carry out ADLs; assess patient's baseline for ADL function and identify physical deficits which impact ability to perform ADLs (bathing, care of mouth/teeth, toileting, grooming, dressing, etc )  - Assess/evaluate cause of self-care deficits   - Assess range of motion  - Assess patient's mobility; develop plan if impaired  - Assess patient's need for assistive devices and provide as appropriate  - Encourage maximum independence but intervene and supervise when necessary  - Involve family in performance of ADLs  - Assess for home care needs following discharge   - Consider OT consult to assist with ADL evaluation and planning for discharge  - Provide patient education as appropriate  Outcome: Progressing  Goal: Maintain or return mobility status to optimal level  Description: INTERVENTIONS:  - Assess patient's baseline mobility status (ambulation, transfers, stairs, etc )    - Identify cognitive and physical deficits and behaviors that affect mobility  - Identify mobility aids required to assist with transfers and/or ambulation (gait belt, sit-to-stand, lift, walker, cane, etc )  - [place] fall precautions as indicated by assessment  - Record patient progress and toleration of activity level on Mobility SBAR; progress patient to next Phase/Stage  - Instruct patient to call for assistance with activity based on assessment  - Consider rehabilitation consult to assist with strengthening/weightbearing, etc   Outcome: Progressing     Problem: DISCHARGE PLANNING  Goal: Discharge to home or other facility with appropriate resources  Description: INTERVENTIONS:  - Identify barriers to discharge w/patient and caregiver  - Arrange for needed discharge resources and transportation as appropriate  - Identify discharge learning needs (meds, wound care, etc )  - Arrange for interpretive services to assist at discharge as needed  - Refer to Case Management Department for coordinating discharge planning if the patient needs post-hospital services based on physician/advanced practitioner order or complex needs related to functional status, cognitive ability, or social support system  Outcome: Progressing     Problem: Knowledge Deficit  Goal: Patient/family/caregiver demonstrates understanding of disease process, treatment plan, medications, and discharge instructions  Description: Complete learning assessment and assess knowledge base    Interventions:  - Provide teaching at level of understanding  - Provide teaching via preferred learning methods  Outcome: Progressing

## 2021-04-26 ENCOUNTER — HOSPITAL ENCOUNTER (OUTPATIENT)
Dept: INFUSION CENTER | Facility: HOSPITAL | Age: 51
Discharge: HOME | End: 2021-04-26
Attending: INTERNAL MEDICINE

## 2021-05-06 DIAGNOSIS — D50.9 IRON DEFICIENCY ANEMIA, UNSPECIFIED IRON DEFICIENCY ANEMIA TYPE: ICD-10-CM

## 2021-05-06 DIAGNOSIS — E53.8 B12 DEFICIENCY: Primary | ICD-10-CM

## 2021-05-06 RX ORDER — CYANOCOBALAMIN 1000 UG/ML
1000 INJECTION INTRAMUSCULAR; SUBCUTANEOUS ONCE
Status: CANCELLED | OUTPATIENT
Start: 2021-05-06

## 2021-05-10 ENCOUNTER — HOSPITAL ENCOUNTER (OUTPATIENT)
Dept: INFUSION CENTER | Facility: HOSPITAL | Age: 51
Discharge: HOME | End: 2021-05-10
Attending: INTERNAL MEDICINE
Payer: COMMERCIAL

## 2021-05-10 VITALS
SYSTOLIC BLOOD PRESSURE: 139 MMHG | HEART RATE: 87 BPM | TEMPERATURE: 96.7 F | OXYGEN SATURATION: 98 % | RESPIRATION RATE: 14 BRPM | DIASTOLIC BLOOD PRESSURE: 79 MMHG

## 2021-05-10 DIAGNOSIS — D50.9 IRON DEFICIENCY ANEMIA, UNSPECIFIED IRON DEFICIENCY ANEMIA TYPE: ICD-10-CM

## 2021-05-10 DIAGNOSIS — E53.8 B12 DEFICIENCY: Primary | ICD-10-CM

## 2021-05-10 PROCEDURE — 96372 THER/PROPH/DIAG INJ SC/IM: CPT

## 2021-05-10 RX ORDER — CYANOCOBALAMIN 1000 UG/ML
1000 INJECTION INTRAMUSCULAR; SUBCUTANEOUS ONCE
Status: CANCELLED | OUTPATIENT
Start: 2021-06-07

## 2021-05-10 RX ORDER — CYANOCOBALAMIN 1000 UG/ML
1000 INJECTION INTRAMUSCULAR; SUBCUTANEOUS ONCE
Status: COMPLETED | OUTPATIENT
Start: 2021-05-10 | End: 2021-05-10

## 2021-05-10 RX ADMIN — CYANOCOBALAMIN 1000 MCG: 1000 INJECTION, SOLUTION INTRAMUSCULAR at 13:18

## 2021-06-07 ENCOUNTER — HOSPITAL ENCOUNTER (OUTPATIENT)
Dept: INFUSION CENTER | Facility: HOSPITAL | Age: 51
Discharge: HOME | End: 2021-06-07
Attending: INTERNAL MEDICINE
Payer: COMMERCIAL

## 2021-06-07 VITALS
TEMPERATURE: 97.7 F | OXYGEN SATURATION: 98 % | HEART RATE: 99 BPM | DIASTOLIC BLOOD PRESSURE: 72 MMHG | RESPIRATION RATE: 14 BRPM | SYSTOLIC BLOOD PRESSURE: 124 MMHG

## 2021-06-07 DIAGNOSIS — E53.8 B12 DEFICIENCY: Primary | ICD-10-CM

## 2021-06-07 DIAGNOSIS — D50.9 IRON DEFICIENCY ANEMIA, UNSPECIFIED IRON DEFICIENCY ANEMIA TYPE: ICD-10-CM

## 2021-06-07 PROCEDURE — 96372 THER/PROPH/DIAG INJ SC/IM: CPT

## 2021-06-07 RX ORDER — CYANOCOBALAMIN 1000 UG/ML
1000 INJECTION INTRAMUSCULAR; SUBCUTANEOUS ONCE
Status: COMPLETED | OUTPATIENT
Start: 2021-06-07 | End: 2021-06-07

## 2021-06-07 RX ORDER — CYANOCOBALAMIN 1000 UG/ML
1000 INJECTION INTRAMUSCULAR; SUBCUTANEOUS ONCE
Status: CANCELLED | OUTPATIENT
Start: 2021-07-07

## 2021-06-07 RX ADMIN — CYANOCOBALAMIN 1000 MCG: 1000 INJECTION, SOLUTION INTRAMUSCULAR at 13:01

## 2021-06-07 NOTE — PROGRESS NOTES
Pt here for B12; injection given with no adverse reactions; bandaid dry and intact; pt left unit ambulatory with steady gait

## 2021-06-10 DIAGNOSIS — I10 ESSENTIAL HYPERTENSION: ICD-10-CM

## 2021-06-10 RX ORDER — LISINOPRIL 20 MG/1
TABLET ORAL
Qty: 90 TABLET | Refills: 0 | Status: SHIPPED | OUTPATIENT
Start: 2021-06-10 | End: 2021-09-16

## 2021-07-07 ENCOUNTER — HOSPITAL ENCOUNTER (OUTPATIENT)
Dept: INFUSION CENTER | Facility: HOSPITAL | Age: 51
Discharge: HOME | End: 2021-07-07
Attending: INTERNAL MEDICINE
Payer: COMMERCIAL

## 2021-07-07 VITALS
RESPIRATION RATE: 16 BRPM | OXYGEN SATURATION: 97 % | TEMPERATURE: 99.2 F | SYSTOLIC BLOOD PRESSURE: 157 MMHG | DIASTOLIC BLOOD PRESSURE: 98 MMHG | HEART RATE: 91 BPM

## 2021-07-07 DIAGNOSIS — D50.9 IRON DEFICIENCY ANEMIA, UNSPECIFIED IRON DEFICIENCY ANEMIA TYPE: ICD-10-CM

## 2021-07-07 DIAGNOSIS — E53.8 B12 DEFICIENCY: Primary | ICD-10-CM

## 2021-07-07 PROCEDURE — 96372 THER/PROPH/DIAG INJ SC/IM: CPT

## 2021-07-07 RX ORDER — CYANOCOBALAMIN 1000 UG/ML
1000 INJECTION INTRAMUSCULAR; SUBCUTANEOUS ONCE
Status: CANCELLED | OUTPATIENT
Start: 2021-08-04

## 2021-07-07 RX ORDER — CYANOCOBALAMIN 1000 UG/ML
1000 INJECTION INTRAMUSCULAR; SUBCUTANEOUS ONCE
Status: COMPLETED | OUTPATIENT
Start: 2021-07-07 | End: 2021-07-07

## 2021-07-07 RX ADMIN — CYANOCOBALAMIN 1000 MCG: 1000 INJECTION, SOLUTION INTRAMUSCULAR at 14:11

## 2021-07-07 NOTE — PROGRESS NOTES
Pt tolerated B12 injection with no complications  Next appt scheduled  Pt left ambulatory with steady gait for d/c to home

## 2021-08-04 ENCOUNTER — OFFICE VISIT (OUTPATIENT)
Dept: URGENT CARE | Facility: CLINIC | Age: 51
End: 2021-08-04
Payer: COMMERCIAL

## 2021-08-04 ENCOUNTER — HOSPITAL ENCOUNTER (OUTPATIENT)
Dept: INFUSION CENTER | Facility: HOSPITAL | Age: 51
Discharge: HOME | End: 2021-08-04
Attending: INTERNAL MEDICINE
Payer: COMMERCIAL

## 2021-08-04 VITALS
SYSTOLIC BLOOD PRESSURE: 128 MMHG | HEIGHT: 63 IN | WEIGHT: 266 LBS | RESPIRATION RATE: 16 BRPM | TEMPERATURE: 97.4 F | BODY MASS INDEX: 47.13 KG/M2 | DIASTOLIC BLOOD PRESSURE: 78 MMHG | HEART RATE: 80 BPM | OXYGEN SATURATION: 98 %

## 2021-08-04 VITALS
DIASTOLIC BLOOD PRESSURE: 85 MMHG | OXYGEN SATURATION: 97 % | TEMPERATURE: 98 F | HEART RATE: 99 BPM | SYSTOLIC BLOOD PRESSURE: 135 MMHG | RESPIRATION RATE: 18 BRPM

## 2021-08-04 DIAGNOSIS — R23.8 SKIN IRRITATION: Primary | ICD-10-CM

## 2021-08-04 DIAGNOSIS — D50.9 IRON DEFICIENCY ANEMIA, UNSPECIFIED IRON DEFICIENCY ANEMIA TYPE: ICD-10-CM

## 2021-08-04 DIAGNOSIS — E53.8 B12 DEFICIENCY: Primary | ICD-10-CM

## 2021-08-04 PROCEDURE — 99213 OFFICE O/P EST LOW 20 MIN: CPT | Performed by: PHYSICIAN ASSISTANT

## 2021-08-04 PROCEDURE — 96372 THER/PROPH/DIAG INJ SC/IM: CPT

## 2021-08-04 PROCEDURE — S9088 SERVICES PROVIDED IN URGENT: HCPCS | Performed by: PHYSICIAN ASSISTANT

## 2021-08-04 RX ORDER — CYANOCOBALAMIN 1000 UG/ML
1000 INJECTION INTRAMUSCULAR; SUBCUTANEOUS ONCE
Status: CANCELLED | OUTPATIENT
Start: 2021-09-03

## 2021-08-04 RX ORDER — CYANOCOBALAMIN 1000 UG/ML
1000 INJECTION INTRAMUSCULAR; SUBCUTANEOUS ONCE
Status: COMPLETED | OUTPATIENT
Start: 2021-08-04 | End: 2021-08-04

## 2021-08-04 RX ORDER — TRIAMCINOLONE ACETONIDE 1 MG/G
CREAM TOPICAL 2 TIMES DAILY
Qty: 30 G | Refills: 0 | Status: SHIPPED | OUTPATIENT
Start: 2021-08-04

## 2021-08-04 RX ADMIN — CYANOCOBALAMIN 1000 MCG: 1000 INJECTION, SOLUTION INTRAMUSCULAR at 12:55

## 2021-08-04 NOTE — PROGRESS NOTES
NAME: Ulises Sebastian is a 44 y o  female  : 1970    MRN: [id number]      Assessment and Plan   Skin irritation [R23 8]  1  Skin irritation  triamcinolone (KENALOG) 0 1 % cream           Patient Instructions     Patient Instructions    Steroid cream to the area  Ice and heat   If no improvement in 3-4 days follow-up with PCP   If anything changes or worsens follow-up sooner    Proceed to ER if symptoms worsen  Chief Complaint     Chief Complaint   Patient presents with    Skin Problem     Pt reports her right lower leg tenderness/swelling that began on Saturday  History of Present Illness    Patient with no pertinent past medical history presents complaining of area of skin irritation to right lower leg  Reports while gardening she felt like something "stuck or stung" her above her right ankle  States the area has been sore and a little swollen since  Denies any fevers, chills, nausea, vomiting  Has not tried anything over-the-counter      Review of Systems   Review of Systems   Constitutional: Negative for chills and fever  Gastrointestinal: Negative for diarrhea, nausea and vomiting  Musculoskeletal: Negative for joint swelling  Skin: Positive for rash           Current Medications       Current Outpatient Medications:     albuterol (PROVENTIL HFA,VENTOLIN HFA) 90 mcg/act inhaler, Inhale 2 puffs every 6 (six) hours as needed for wheezing or shortness of breath, Disp: 1 Inhaler, Rfl: 0    lisinopril (ZESTRIL) 20 mg tablet, TAKE 1 TABLET BY MOUTH EVERY DAY, Disp: 90 tablet, Rfl: 0    loratadine (CLARITIN) 10 mg tablet, TAKE 1 TABLET BY MOUTH EVERY DAY, Disp: 90 tablet, Rfl: 0    LORazepam (ATIVAN) 0 5 mg tablet, Take 0 5 mg by mouth daily as needed, Disp: , Rfl: 2    QUEtiapine (SEROquel) 100 mg tablet, Take 100 mg by mouth daily at bedtime, Disp: , Rfl:     venlafaxine (EFFEXOR) 50 mg tablet, Take 225 mg by mouth daily , Disp: , Rfl:     venlafaxine (EFFEXOR-XR) 150 mg 24 hr capsule, Take 150 mg by mouth daily, Disp: , Rfl:     amoxicillin (AMOXIL) 500 mg capsule, TAKE 1 CAPSULE BY MOUTH 3 TIMES A DAY UNTIL GONE (Patient not taking: Reported on 8/4/2021), Disp: , Rfl:     benzonatate (TESSALON PERLES) 100 mg capsule, Take 1 capsule (100 mg total) by mouth 3 (three) times a day as needed for cough (Patient not taking: Reported on 3/11/2021), Disp: 30 capsule, Rfl: 0    bisacodyl (DULCOLAX) 5 mg EC tablet, Take 2 tablets (10 mg total) by mouth once for 1 dose, Disp: 2 tablet, Rfl: 0    chlorhexidine (PERIDEX) 0 12 % solution, SWISH AND SPIT 15MLS TWICE DAY (Patient not taking: Reported on 8/4/2021), Disp: , Rfl:     Diclofenac Sodium (VOLTAREN) 1 %, Apply 2 g topically 4 (four) times a day (Patient not taking: Reported on 3/11/2021), Disp: 1 Tube, Rfl: 2    ferrous sulfate 325 (65 Fe) mg tablet, Take 1 tablet (325 mg total) by mouth daily with breakfast, Disp: 30 tablet, Rfl: 0    fluticasone (FLONASE) 50 mcg/act nasal spray, 1 spray into each nostril daily (Patient not taking: Reported on 8/4/2021), Disp: 1 Bottle, Rfl: 2    guaiFENesin (ROBITUSSIN) 100 MG/5ML oral liquid, Take 10 mL (200 mg total) by mouth 3 (three) times a day as needed for cough (Patient not taking: Reported on 3/11/2021), Disp: 120 mL, Rfl: 0    HYDROcodone-acetaminophen (NORCO) 5-325 mg per tablet, TAKE 1 TABLET BY MOUTH EVERY 4 HOURS AS NEEDED FOR DISCOMFORT (Patient not taking: Reported on 8/4/2021), Disp: , Rfl:     ibuprofen (MOTRIN) 800 mg tablet, Take 1 tablet (800 mg total) by mouth every 8 (eight) hours (Patient not taking: Reported on 8/4/2021), Disp: 30 tablet, Rfl: 0    ketotifen (ZADITOR) 0 025 % ophthalmic solution, Administer 1 drop to both eyes 2 (two) times a day (Patient not taking: Reported on 3/11/2021), Disp: 5 mL, Rfl: 0    methocarbamol (ROBAXIN) 500 mg tablet, Take 1 tablet (500 mg total) by mouth 4 (four) times a day (Patient not taking: Reported on 3/11/2021), Disp: 28 tablet, Rfl: 0    polyethylene glycol (GOLYTELY) 4000 mL solution, Take 4,000 mL by mouth once for 1 dose, Disp: 4000 mL, Rfl: 0    tobramycin in sterile water-balanced salts ophthalmic solution, Apply 1-2 drops to eye every 4 (four) hours (Patient not taking: Reported on 3/11/2021), Disp: 7 mL, Rfl: 0    triamcinolone (KENALOG) 0 1 % cream, Apply topically 2 (two) times a day, Disp: 30 g, Rfl: 0    venlafaxine (EFFEXOR-XR) 75 mg 24 hr capsule, Take 75 mg by mouth daily, Disp: , Rfl:     Current Allergies     Allergies as of 2021 - Reviewed 2021   Allergen Reaction Noted    Codeine  2016    Latex  2016    Morphine  2016    Morphine and related  2017    Sulfa antibiotics  2017              Past Medical History:   Diagnosis Date    Anemia     iron def with 12 weeks of infusions    Anxiety     Arthritis     knee    Asthma     Bipolar disorder ([facility] [address] )     Chronic pain disorder     knee    Hypertension     Kidney stone         Migraine     Palpitations     Pneumonia 2010    Psychiatric disorder        Past Surgical History:   Procedure Laterality Date    BREAST BIOPSY Right 2020     SECTION      CHOLECYSTECTOMY      FEMUR FRACTURE SURGERY      FRACTURE SURGERY      Treatment of fractures of both radial and ulnar shafts     FRACTURE SURGERY      Treatment pf tibial shaft fracture with plate/screws    GASTRIC BYPASS      HERNIA REPAIR      HYSTERECTOMY      SHOULDER SURGERY Right     US GUIDED BREAST BIOPSY RIGHT COMPLETE Right 3/13/2020       Family History   Problem Relation Age of Onset    Diabetes Father     Hypertension Father     Diabetes Maternal Grandmother     Hypertension Maternal Grandmother     Breast cancer Maternal Aunt     Ovarian cancer Maternal Aunt     Thyroid disease Mother     Hernia Mother          Medications have been verified      The following portions of the patient's history were reviewed and updated as appropriate: allergies, current medications, past family history, past medical history, past social history, past surgical history and problem list     Objective   /78   Pulse 80   Temp (!) 97 4 °F (36 3 °C)   Resp 16   Ht 5' 3" (1 6 m)   Wt 121 kg (266 lb)   SpO2 98%   BMI 47 12 kg/m²      Physical Exam     Physical Exam  Vitals and nursing note reviewed  Constitutional:       General: She is not in acute distress  Appearance: Normal appearance  She is not ill-appearing, toxic-appearing or diaphoretic  Cardiovascular:      Rate and Rhythm: Normal rate and regular rhythm  Pulmonary:      Effort: Pulmonary effort is normal  No respiratory distress  Skin:     Comments: Area to the right lower leg just above the ankle - appears to be consistent with an insect bite  No surrounding erythema  No abnormal warmth  No foreign bodies  Mildly tender to palpation  No generalized lower leg edema or erythema  Sensation intact  Cap refill less than 2 seconds   Neurological:      Mental Status: She is alert

## 2021-08-04 NOTE — PROGRESS NOTES
Pt here for B12; injection given with no adverse reactions; bandaid dry and intact; pt d/c'd home ambulatory with steady gait

## 2021-08-04 NOTE — PATIENT INSTRUCTIONS
Steroid cream to the area  Ice and heat   If no improvement in 3-4 days follow-up with PCP   If anything changes or worsens follow-up sooner

## 2021-09-02 ENCOUNTER — LAB (OUTPATIENT)
Dept: LAB | Facility: HOSPITAL | Age: 51
End: 2021-09-02
Attending: INTERNAL MEDICINE
Payer: COMMERCIAL

## 2021-09-02 DIAGNOSIS — D50.9 IRON DEFICIENCY ANEMIA, UNSPECIFIED IRON DEFICIENCY ANEMIA TYPE: ICD-10-CM

## 2021-09-02 DIAGNOSIS — E53.8 B12 DEFICIENCY: ICD-10-CM

## 2021-09-02 LAB
ALBUMIN SERPL BCP-MCNC: 3.1 G/DL (ref 3.5–5)
ALP SERPL-CCNC: 117 U/L (ref 46–116)
ALT SERPL W P-5'-P-CCNC: 20 U/L (ref 12–78)
ANION GAP SERPL CALCULATED.3IONS-SCNC: 5 MMOL/L (ref 4–13)
AST SERPL W P-5'-P-CCNC: 17 U/L (ref 5–45)
BASOPHILS # BLD AUTO: 0.08 THOUSANDS/ΜL (ref 0–0.1)
BASOPHILS NFR BLD AUTO: 1 % (ref 0–1)
BILIRUB SERPL-MCNC: 0.43 MG/DL (ref 0.2–1)
BUN SERPL-MCNC: 20 MG/DL (ref 5–25)
CALCIUM ALBUM COR SERPL-MCNC: 9.6 MG/DL (ref 8.3–10.1)
CALCIUM SERPL-MCNC: 8.9 MG/DL (ref 8.3–10.1)
CHLORIDE SERPL-SCNC: 106 MMOL/L (ref 100–108)
CO2 SERPL-SCNC: 28 MMOL/L (ref 21–32)
CREAT SERPL-MCNC: 0.77 MG/DL (ref 0.6–1.3)
EOSINOPHIL # BLD AUTO: 0.22 THOUSAND/ΜL (ref 0–0.61)
EOSINOPHIL NFR BLD AUTO: 3 % (ref 0–6)
ERYTHROCYTE [DISTWIDTH] IN BLOOD BY AUTOMATED COUNT: 12.8 % (ref 11.6–15.1)
FERRITIN SERPL-MCNC: 19 NG/ML (ref 8–388)
GFR SERPL CREATININE-BSD FRML MDRD: 88 ML/MIN/1.73SQ M
GLUCOSE P FAST SERPL-MCNC: 100 MG/DL (ref 65–99)
HCT VFR BLD AUTO: 45.9 % (ref 34.8–46.1)
HGB BLD-MCNC: 14.3 G/DL (ref 11.5–15.4)
IMM GRANULOCYTES # BLD AUTO: 0.02 THOUSAND/UL (ref 0–0.2)
IMM GRANULOCYTES NFR BLD AUTO: 0 % (ref 0–2)
IRON SATN MFR SERPL: 21 %
IRON SERPL-MCNC: 77 UG/DL (ref 50–170)
LYMPHOCYTES # BLD AUTO: 1.59 THOUSANDS/ΜL (ref 0.6–4.47)
LYMPHOCYTES NFR BLD AUTO: 24 % (ref 14–44)
MCH RBC QN AUTO: 30.4 PG (ref 26.8–34.3)
MCHC RBC AUTO-ENTMCNC: 31.2 G/DL (ref 31.4–37.4)
MCV RBC AUTO: 98 FL (ref 82–98)
MONOCYTES # BLD AUTO: 0.53 THOUSAND/ΜL (ref 0.17–1.22)
MONOCYTES NFR BLD AUTO: 8 % (ref 4–12)
NEUTROPHILS # BLD AUTO: 4.23 THOUSANDS/ΜL (ref 1.85–7.62)
NEUTS SEG NFR BLD AUTO: 64 % (ref 43–75)
NRBC BLD AUTO-RTO: 0 /100 WBCS
PLATELET # BLD AUTO: 255 THOUSANDS/UL (ref 149–390)
PMV BLD AUTO: 9.5 FL (ref 8.9–12.7)
POTASSIUM SERPL-SCNC: 3.8 MMOL/L (ref 3.5–5.3)
PROT SERPL-MCNC: 7 G/DL (ref 6.4–8.2)
RBC # BLD AUTO: 4.71 MILLION/UL (ref 3.81–5.12)
SODIUM SERPL-SCNC: 139 MMOL/L (ref 136–145)
TIBC SERPL-MCNC: 363 UG/DL (ref 250–450)
WBC # BLD AUTO: 6.67 THOUSAND/UL (ref 4.31–10.16)

## 2021-09-02 PROCEDURE — 83918 ORGANIC ACIDS TOTAL QUANT: CPT

## 2021-09-02 PROCEDURE — 83540 ASSAY OF IRON: CPT

## 2021-09-02 PROCEDURE — 80053 COMPREHEN METABOLIC PANEL: CPT

## 2021-09-02 PROCEDURE — 85025 COMPLETE CBC W/AUTO DIFF WBC: CPT

## 2021-09-02 PROCEDURE — 83550 IRON BINDING TEST: CPT

## 2021-09-02 PROCEDURE — 36415 COLL VENOUS BLD VENIPUNCTURE: CPT

## 2021-09-02 PROCEDURE — 82728 ASSAY OF FERRITIN: CPT

## 2021-09-03 ENCOUNTER — TELEPHONE (OUTPATIENT)
Dept: HEMATOLOGY ONCOLOGY | Facility: CLINIC | Age: 51
End: 2021-09-03

## 2021-09-03 NOTE — TELEPHONE ENCOUNTER
Reschedule Appointment     Who is calling in Patient    Doctor Appointment Scheduled with Dr Nelda Lezama date and time 09/03 at 1:20pm   New date and time 10/11 at 1:40pm   Location [place]   Patient verbalized understanding    yes

## 2021-09-05 LAB
METHYLMALONATE SERPL-SCNC: 187 NMOL/L (ref 0–378)
SL AMB DISCLAIMER: NORMAL

## 2021-09-15 ENCOUNTER — HOSPITAL ENCOUNTER (OUTPATIENT)
Dept: INFUSION CENTER | Facility: HOSPITAL | Age: 51
Discharge: HOME | End: 2021-09-15
Attending: INTERNAL MEDICINE
Payer: COMMERCIAL

## 2021-09-15 VITALS
HEART RATE: 92 BPM | SYSTOLIC BLOOD PRESSURE: 135 MMHG | DIASTOLIC BLOOD PRESSURE: 80 MMHG | RESPIRATION RATE: 16 BRPM | TEMPERATURE: 97 F | OXYGEN SATURATION: 96 %

## 2021-09-15 DIAGNOSIS — D50.9 IRON DEFICIENCY ANEMIA, UNSPECIFIED IRON DEFICIENCY ANEMIA TYPE: ICD-10-CM

## 2021-09-15 DIAGNOSIS — I10 ESSENTIAL HYPERTENSION: ICD-10-CM

## 2021-09-15 DIAGNOSIS — E53.8 B12 DEFICIENCY: Primary | ICD-10-CM

## 2021-09-15 PROCEDURE — 96372 THER/PROPH/DIAG INJ SC/IM: CPT

## 2021-09-15 RX ORDER — CYANOCOBALAMIN 1000 UG/ML
1000 INJECTION INTRAMUSCULAR; SUBCUTANEOUS ONCE
Status: CANCELLED | OUTPATIENT
Start: 2021-10-13

## 2021-09-15 RX ORDER — CYANOCOBALAMIN 1000 UG/ML
1000 INJECTION INTRAMUSCULAR; SUBCUTANEOUS ONCE
Status: COMPLETED | OUTPATIENT
Start: 2021-09-15 | End: 2021-09-15

## 2021-09-15 RX ADMIN — CYANOCOBALAMIN 1000 MCG: 1000 INJECTION, SOLUTION INTRAMUSCULAR at 07:54

## 2021-09-16 RX ORDER — LISINOPRIL 20 MG/1
TABLET ORAL
Qty: 90 TABLET | Refills: 0 | Status: SHIPPED | OUTPATIENT
Start: 2021-09-16 | End: 2021-11-03

## 2021-10-07 ENCOUNTER — OFFICE VISIT (OUTPATIENT)
Dept: FAMILY MEDICINE CLINIC | Facility: CLINIC | Age: 51
End: 2021-10-07

## 2021-10-07 VITALS
HEIGHT: 63 IN | DIASTOLIC BLOOD PRESSURE: 78 MMHG | OXYGEN SATURATION: 95 % | WEIGHT: 271.9 LBS | SYSTOLIC BLOOD PRESSURE: 126 MMHG | RESPIRATION RATE: 18 BRPM | HEART RATE: 116 BPM | TEMPERATURE: 98.2 F | BODY MASS INDEX: 48.18 KG/M2

## 2021-10-07 DIAGNOSIS — M17.0 PRIMARY OSTEOARTHRITIS OF BOTH KNEES: ICD-10-CM

## 2021-10-07 DIAGNOSIS — G89.29 CHRONIC BILATERAL LOW BACK PAIN, UNSPECIFIED WHETHER SCIATICA PRESENT: ICD-10-CM

## 2021-10-07 DIAGNOSIS — E66.01 MORBID OBESITY WITH BMI OF 45.0-49.9, ADULT (HCC): ICD-10-CM

## 2021-10-07 DIAGNOSIS — E66.9 OBESITY (BMI 35.0-39.9 WITHOUT COMORBIDITY): ICD-10-CM

## 2021-10-07 DIAGNOSIS — M25.561 CHRONIC PAIN OF BOTH KNEES: ICD-10-CM

## 2021-10-07 DIAGNOSIS — I10 PRIMARY HYPERTENSION: Primary | ICD-10-CM

## 2021-10-07 DIAGNOSIS — G89.29 CHRONIC PAIN OF BOTH KNEES: ICD-10-CM

## 2021-10-07 DIAGNOSIS — M25.562 CHRONIC PAIN OF BOTH KNEES: ICD-10-CM

## 2021-10-07 DIAGNOSIS — Z23 ENCOUNTER FOR IMMUNIZATION: ICD-10-CM

## 2021-10-07 DIAGNOSIS — M54.50 CHRONIC BILATERAL LOW BACK PAIN, UNSPECIFIED WHETHER SCIATICA PRESENT: ICD-10-CM

## 2021-10-07 DIAGNOSIS — Z02.89 ENCOUNTER FOR COMPLETION OF FORM WITH PATIENT: ICD-10-CM

## 2021-10-07 PROCEDURE — 90471 IMMUNIZATION ADMIN: CPT | Performed by: FAMILY MEDICINE

## 2021-10-07 PROCEDURE — 3008F BODY MASS INDEX DOCD: CPT | Performed by: FAMILY MEDICINE

## 2021-10-07 PROCEDURE — G0008 ADMIN INFLUENZA VIRUS VAC: HCPCS | Performed by: FAMILY MEDICINE

## 2021-10-07 PROCEDURE — 99213 OFFICE O/P EST LOW 20 MIN: CPT | Performed by: FAMILY MEDICINE

## 2021-10-07 PROCEDURE — 90682 RIV4 VACC RECOMBINANT DNA IM: CPT | Performed by: FAMILY MEDICINE

## 2021-10-07 PROCEDURE — 4004F PT TOBACCO SCREEN RCVD TLK: CPT | Performed by: FAMILY MEDICINE

## 2021-10-07 PROCEDURE — 90732 PPSV23 VACC 2 YRS+ SUBQ/IM: CPT | Performed by: FAMILY MEDICINE

## 2021-10-07 PROCEDURE — G0009 ADMIN PNEUMOCOCCAL VACCINE: HCPCS | Performed by: FAMILY MEDICINE

## 2021-10-07 RX ORDER — IBUPROFEN 800 MG/1
800 TABLET ORAL EVERY 6 HOURS PRN
Qty: 30 TABLET | Refills: 1 | Status: SHIPPED | OUTPATIENT
Start: 2021-10-07 | End: 2021-12-22 | Stop reason: SDUPTHER

## 2021-10-07 RX ORDER — METHOCARBAMOL 750 MG/1
750 TABLET, FILM COATED ORAL EVERY 6 HOURS PRN
Qty: 30 TABLET | Refills: 1 | Status: SHIPPED | OUTPATIENT
Start: 2021-10-07 | End: 2021-12-22 | Stop reason: SDUPTHER

## 2021-10-11 ENCOUNTER — TELEPHONE (OUTPATIENT)
Dept: HEMATOLOGY ONCOLOGY | Facility: HOSPITAL | Age: 51
End: 2021-10-11

## 2021-10-11 ENCOUNTER — TELEPHONE (OUTPATIENT)
Dept: HEMATOLOGY ONCOLOGY | Facility: CLINIC | Age: 51
End: 2021-10-11

## 2021-10-13 ENCOUNTER — HOSPITAL ENCOUNTER (OUTPATIENT)
Dept: INFUSION CENTER | Facility: HOSPITAL | Age: 51
Discharge: HOME | End: 2021-10-13
Attending: INTERNAL MEDICINE
Payer: COMMERCIAL

## 2021-10-13 VITALS
SYSTOLIC BLOOD PRESSURE: 139 MMHG | OXYGEN SATURATION: 96 % | TEMPERATURE: 97 F | HEART RATE: 96 BPM | DIASTOLIC BLOOD PRESSURE: 83 MMHG

## 2021-10-13 DIAGNOSIS — D50.9 IRON DEFICIENCY ANEMIA, UNSPECIFIED IRON DEFICIENCY ANEMIA TYPE: ICD-10-CM

## 2021-10-13 DIAGNOSIS — E53.8 B12 DEFICIENCY: Primary | ICD-10-CM

## 2021-10-13 PROCEDURE — 96372 THER/PROPH/DIAG INJ SC/IM: CPT

## 2021-10-13 RX ORDER — CYANOCOBALAMIN 1000 UG/ML
1000 INJECTION INTRAMUSCULAR; SUBCUTANEOUS ONCE
Status: COMPLETED | OUTPATIENT
Start: 2021-10-13 | End: 2021-10-13

## 2021-10-13 RX ORDER — CYANOCOBALAMIN 1000 UG/ML
1000 INJECTION INTRAMUSCULAR; SUBCUTANEOUS ONCE
Status: CANCELLED | OUTPATIENT
Start: 2021-11-10

## 2021-10-13 RX ADMIN — CYANOCOBALAMIN 1000 MCG: 1000 INJECTION, SOLUTION INTRAMUSCULAR at 08:27

## 2021-11-03 DIAGNOSIS — I10 ESSENTIAL HYPERTENSION: ICD-10-CM

## 2021-11-03 RX ORDER — LISINOPRIL 20 MG/1
TABLET ORAL
Qty: 90 TABLET | Refills: 0 | Status: SHIPPED | OUTPATIENT
Start: 2021-11-03

## 2021-11-10 ENCOUNTER — HOSPITAL ENCOUNTER (OUTPATIENT)
Dept: INFUSION CENTER | Facility: HOSPITAL | Age: 51
Discharge: HOME | End: 2021-11-10
Attending: INTERNAL MEDICINE
Payer: COMMERCIAL

## 2021-11-10 VITALS
TEMPERATURE: 97.2 F | SYSTOLIC BLOOD PRESSURE: 140 MMHG | DIASTOLIC BLOOD PRESSURE: 76 MMHG | OXYGEN SATURATION: 95 % | RESPIRATION RATE: 18 BRPM | HEART RATE: 94 BPM

## 2021-11-10 DIAGNOSIS — D50.9 IRON DEFICIENCY ANEMIA, UNSPECIFIED IRON DEFICIENCY ANEMIA TYPE: ICD-10-CM

## 2021-11-10 DIAGNOSIS — E53.8 B12 DEFICIENCY: Primary | ICD-10-CM

## 2021-11-10 PROCEDURE — 96372 THER/PROPH/DIAG INJ SC/IM: CPT

## 2021-11-10 RX ORDER — CYANOCOBALAMIN 1000 UG/ML
1000 INJECTION INTRAMUSCULAR; SUBCUTANEOUS ONCE
Status: CANCELLED | OUTPATIENT
Start: 2021-12-08

## 2021-11-10 RX ORDER — CYANOCOBALAMIN 1000 UG/ML
1000 INJECTION INTRAMUSCULAR; SUBCUTANEOUS ONCE
Status: COMPLETED | OUTPATIENT
Start: 2021-11-10 | End: 2021-11-10

## 2021-11-10 RX ADMIN — CYANOCOBALAMIN 1000 MCG: 1000 INJECTION, SOLUTION INTRAMUSCULAR at 08:19

## 2021-11-22 ENCOUNTER — TELEPHONE (OUTPATIENT)
Dept: FAMILY MEDICINE CLINIC | Facility: CLINIC | Age: 51
End: 2021-11-22

## 2021-12-08 ENCOUNTER — HOSPITAL ENCOUNTER (OUTPATIENT)
Dept: INFUSION CENTER | Facility: HOSPITAL | Age: 51
Discharge: HOME | End: 2021-12-08
Attending: INTERNAL MEDICINE
Payer: COMMERCIAL

## 2021-12-08 VITALS
HEART RATE: 91 BPM | DIASTOLIC BLOOD PRESSURE: 80 MMHG | OXYGEN SATURATION: 96 % | SYSTOLIC BLOOD PRESSURE: 148 MMHG | RESPIRATION RATE: 18 BRPM | TEMPERATURE: 97.5 F

## 2021-12-08 DIAGNOSIS — E53.8 B12 DEFICIENCY: Primary | ICD-10-CM

## 2021-12-08 DIAGNOSIS — D50.9 IRON DEFICIENCY ANEMIA, UNSPECIFIED IRON DEFICIENCY ANEMIA TYPE: ICD-10-CM

## 2021-12-08 PROCEDURE — 96372 THER/PROPH/DIAG INJ SC/IM: CPT

## 2021-12-08 RX ORDER — CYANOCOBALAMIN 1000 UG/ML
1000 INJECTION INTRAMUSCULAR; SUBCUTANEOUS ONCE
Status: COMPLETED | OUTPATIENT
Start: 2021-12-08 | End: 2021-12-08

## 2021-12-08 RX ORDER — CYANOCOBALAMIN 1000 UG/ML
1000 INJECTION INTRAMUSCULAR; SUBCUTANEOUS ONCE
Status: CANCELLED | OUTPATIENT
Start: 2022-01-05

## 2021-12-08 RX ADMIN — CYANOCOBALAMIN 1000 MCG: 1000 INJECTION, SOLUTION INTRAMUSCULAR at 08:24

## 2021-12-22 DIAGNOSIS — M25.561 CHRONIC PAIN OF BOTH KNEES: ICD-10-CM

## 2021-12-22 DIAGNOSIS — G89.29 CHRONIC BILATERAL LOW BACK PAIN, UNSPECIFIED WHETHER SCIATICA PRESENT: ICD-10-CM

## 2021-12-22 DIAGNOSIS — M25.562 CHRONIC PAIN OF BOTH KNEES: ICD-10-CM

## 2021-12-22 DIAGNOSIS — G89.29 CHRONIC PAIN OF BOTH KNEES: ICD-10-CM

## 2021-12-22 DIAGNOSIS — M54.50 CHRONIC BILATERAL LOW BACK PAIN, UNSPECIFIED WHETHER SCIATICA PRESENT: ICD-10-CM

## 2021-12-22 RX ORDER — IBUPROFEN 800 MG/1
800 TABLET ORAL EVERY 6 HOURS PRN
Qty: 30 TABLET | Refills: 1 | Status: SHIPPED | OUTPATIENT
Start: 2021-12-22

## 2021-12-22 RX ORDER — METHOCARBAMOL 750 MG/1
750 TABLET, FILM COATED ORAL EVERY 6 HOURS PRN
Qty: 30 TABLET | Refills: 1 | Status: SHIPPED | OUTPATIENT
Start: 2021-12-22

## 2022-01-05 ENCOUNTER — HOSPITAL ENCOUNTER (OUTPATIENT)
Dept: INFUSION CENTER | Facility: HOSPITAL | Age: 52
Discharge: HOME | End: 2022-01-05
Attending: INTERNAL MEDICINE
Payer: COMMERCIAL

## 2022-01-05 VITALS
RESPIRATION RATE: 20 BRPM | HEART RATE: 89 BPM | TEMPERATURE: 96.8 F | OXYGEN SATURATION: 96 % | DIASTOLIC BLOOD PRESSURE: 90 MMHG | SYSTOLIC BLOOD PRESSURE: 139 MMHG

## 2022-01-05 DIAGNOSIS — D50.9 IRON DEFICIENCY ANEMIA, UNSPECIFIED IRON DEFICIENCY ANEMIA TYPE: ICD-10-CM

## 2022-01-05 DIAGNOSIS — E53.8 B12 DEFICIENCY: Primary | ICD-10-CM

## 2022-01-05 PROCEDURE — 96372 THER/PROPH/DIAG INJ SC/IM: CPT

## 2022-01-05 RX ORDER — CYANOCOBALAMIN 1000 UG/ML
1000 INJECTION INTRAMUSCULAR; SUBCUTANEOUS ONCE
Status: CANCELLED | OUTPATIENT
Start: 2022-02-02

## 2022-01-05 RX ORDER — CYANOCOBALAMIN 1000 UG/ML
1000 INJECTION INTRAMUSCULAR; SUBCUTANEOUS ONCE
Status: COMPLETED | OUTPATIENT
Start: 2022-01-05 | End: 2022-01-05

## 2022-01-05 RX ADMIN — CYANOCOBALAMIN 1000 MCG: 1000 INJECTION, SOLUTION INTRAMUSCULAR at 08:23

## 2022-01-05 NOTE — PROGRESS NOTES
Pt here today for B12 injection  No adverse reactions noted  Declined AVS  Made next two appointments  Left ambulatory with steady gait

## 2022-02-02 ENCOUNTER — HOSPITAL ENCOUNTER (OUTPATIENT)
Dept: INFUSION CENTER | Facility: HOSPITAL | Age: 52
Discharge: HOME | End: 2022-02-02
Attending: INTERNAL MEDICINE
Payer: COMMERCIAL

## 2022-02-02 VITALS
DIASTOLIC BLOOD PRESSURE: 87 MMHG | TEMPERATURE: 96.7 F | HEART RATE: 95 BPM | RESPIRATION RATE: 20 BRPM | SYSTOLIC BLOOD PRESSURE: 135 MMHG

## 2022-02-02 DIAGNOSIS — D50.9 IRON DEFICIENCY ANEMIA, UNSPECIFIED IRON DEFICIENCY ANEMIA TYPE: ICD-10-CM

## 2022-02-02 DIAGNOSIS — E53.8 B12 DEFICIENCY: Primary | ICD-10-CM

## 2022-02-02 PROCEDURE — 96372 THER/PROPH/DIAG INJ SC/IM: CPT

## 2022-02-02 RX ORDER — CYANOCOBALAMIN 1000 UG/ML
1000 INJECTION INTRAMUSCULAR; SUBCUTANEOUS ONCE
Status: CANCELLED | OUTPATIENT
Start: 2022-03-02

## 2022-02-02 RX ORDER — CYANOCOBALAMIN 1000 UG/ML
1000 INJECTION INTRAMUSCULAR; SUBCUTANEOUS ONCE
Status: COMPLETED | OUTPATIENT
Start: 2022-02-02 | End: 2022-02-02

## 2022-02-02 RX ADMIN — CYANOCOBALAMIN 1000 MCG: 1000 INJECTION, SOLUTION INTRAMUSCULAR at 08:16

## 2022-02-02 NOTE — PROGRESS NOTES
Pt here today for B12 injection  Tolerated well no adverse reactions noted  bandaide in place left deltoid  AVS and next appointment given  Pt  Left ambulatory with steady gait

## 2022-03-02 ENCOUNTER — HOSPITAL ENCOUNTER (OUTPATIENT)
Dept: INFUSION CENTER | Facility: HOSPITAL | Age: 52
Discharge: HOME | End: 2022-03-02
Attending: INTERNAL MEDICINE
Payer: COMMERCIAL

## 2022-03-02 VITALS
RESPIRATION RATE: 18 BRPM | HEART RATE: 95 BPM | DIASTOLIC BLOOD PRESSURE: 92 MMHG | OXYGEN SATURATION: 99 % | TEMPERATURE: 96.6 F | SYSTOLIC BLOOD PRESSURE: 152 MMHG

## 2022-03-02 DIAGNOSIS — E53.8 B12 DEFICIENCY: Primary | ICD-10-CM

## 2022-03-02 DIAGNOSIS — D50.9 IRON DEFICIENCY ANEMIA, UNSPECIFIED IRON DEFICIENCY ANEMIA TYPE: ICD-10-CM

## 2022-03-02 PROCEDURE — 96372 THER/PROPH/DIAG INJ SC/IM: CPT

## 2022-03-02 RX ORDER — CYANOCOBALAMIN 1000 UG/ML
1000 INJECTION INTRAMUSCULAR; SUBCUTANEOUS ONCE
Status: CANCELLED | OUTPATIENT
Start: 2022-03-30

## 2022-03-02 RX ORDER — CYANOCOBALAMIN 1000 UG/ML
1000 INJECTION INTRAMUSCULAR; SUBCUTANEOUS ONCE
Status: COMPLETED | OUTPATIENT
Start: 2022-03-02 | End: 2022-03-02

## 2022-03-02 RX ADMIN — CYANOCOBALAMIN 1000 MCG: 1000 INJECTION, SOLUTION INTRAMUSCULAR at 08:21

## 2022-03-02 NOTE — PROGRESS NOTES
Pt received B12 injection into left arm, band aid applied  Next appt scheduled  Pt left ambulatory with steady gait for d/c to home

## 2022-03-30 ENCOUNTER — HOSPITAL ENCOUNTER (OUTPATIENT)
Dept: INFUSION CENTER | Facility: HOSPITAL | Age: 52
Discharge: HOME | End: 2022-03-30
Attending: INTERNAL MEDICINE
Payer: COMMERCIAL

## 2022-03-30 VITALS
OXYGEN SATURATION: 95 % | HEART RATE: 103 BPM | TEMPERATURE: 97.5 F | RESPIRATION RATE: 18 BRPM | DIASTOLIC BLOOD PRESSURE: 73 MMHG | SYSTOLIC BLOOD PRESSURE: 117 MMHG

## 2022-03-30 DIAGNOSIS — E53.8 B12 DEFICIENCY: Primary | ICD-10-CM

## 2022-03-30 DIAGNOSIS — D50.9 IRON DEFICIENCY ANEMIA, UNSPECIFIED IRON DEFICIENCY ANEMIA TYPE: ICD-10-CM

## 2022-03-30 PROCEDURE — 96372 THER/PROPH/DIAG INJ SC/IM: CPT

## 2022-03-30 RX ORDER — CYANOCOBALAMIN 1000 UG/ML
1000 INJECTION INTRAMUSCULAR; SUBCUTANEOUS ONCE
Status: COMPLETED | OUTPATIENT
Start: 2022-03-30 | End: 2022-03-30

## 2022-03-30 RX ORDER — CYANOCOBALAMIN 1000 UG/ML
1000 INJECTION INTRAMUSCULAR; SUBCUTANEOUS ONCE
Status: CANCELLED | OUTPATIENT
Start: 2022-04-27

## 2022-03-30 RX ADMIN — CYANOCOBALAMIN 1000 MCG: 1000 INJECTION, SOLUTION INTRAMUSCULAR; SUBCUTANEOUS at 08:15

## 2022-03-30 NOTE — PROGRESS NOTES
Pt received B12 injection into left arm, band aid applied  Next appt scheduled, declined AVS  Pt left ambulatory with steady gait for d/c to home

## 2022-04-27 ENCOUNTER — HOSPITAL ENCOUNTER (OUTPATIENT)
Dept: INFUSION CENTER | Facility: HOSPITAL | Age: 52
Discharge: HOME | End: 2022-04-27
Attending: INTERNAL MEDICINE
Payer: COMMERCIAL

## 2022-04-27 VITALS
OXYGEN SATURATION: 97 % | RESPIRATION RATE: 16 BRPM | TEMPERATURE: 96.7 F | SYSTOLIC BLOOD PRESSURE: 125 MMHG | HEART RATE: 84 BPM | DIASTOLIC BLOOD PRESSURE: 91 MMHG

## 2022-04-27 DIAGNOSIS — E53.8 B12 DEFICIENCY: Primary | ICD-10-CM

## 2022-04-27 DIAGNOSIS — D50.9 IRON DEFICIENCY ANEMIA, UNSPECIFIED IRON DEFICIENCY ANEMIA TYPE: ICD-10-CM

## 2022-04-27 PROCEDURE — 96372 THER/PROPH/DIAG INJ SC/IM: CPT

## 2022-04-27 RX ORDER — CYANOCOBALAMIN 1000 UG/ML
1000 INJECTION INTRAMUSCULAR; SUBCUTANEOUS ONCE
Status: CANCELLED | OUTPATIENT
Start: 2022-05-25

## 2022-04-27 RX ORDER — CYANOCOBALAMIN 1000 UG/ML
1000 INJECTION INTRAMUSCULAR; SUBCUTANEOUS ONCE
Status: COMPLETED | OUTPATIENT
Start: 2022-04-27 | End: 2022-04-27

## 2022-04-27 RX ADMIN — CYANOCOBALAMIN 1000 MCG: 1000 INJECTION, SOLUTION INTRAMUSCULAR at 08:11

## 2022-04-27 NOTE — PROGRESS NOTES
Pt received B12 injection into left arm, band aid applied  Next three appts scheduled, declined AVS  Pt left ambulatory with steady gait for d/c to home

## 2022-05-25 ENCOUNTER — HOSPITAL ENCOUNTER (OUTPATIENT)
Dept: INFUSION CENTER | Facility: HOSPITAL | Age: 52
Discharge: HOME | End: 2022-05-25
Attending: INTERNAL MEDICINE
Payer: COMMERCIAL

## 2022-05-25 VITALS
HEART RATE: 83 BPM | SYSTOLIC BLOOD PRESSURE: 121 MMHG | RESPIRATION RATE: 12 BRPM | OXYGEN SATURATION: 99 % | DIASTOLIC BLOOD PRESSURE: 74 MMHG | TEMPERATURE: 96.4 F

## 2022-05-25 DIAGNOSIS — D50.9 IRON DEFICIENCY ANEMIA, UNSPECIFIED IRON DEFICIENCY ANEMIA TYPE: ICD-10-CM

## 2022-05-25 DIAGNOSIS — E53.8 B12 DEFICIENCY: Primary | ICD-10-CM

## 2022-05-25 PROCEDURE — 96372 THER/PROPH/DIAG INJ SC/IM: CPT

## 2022-05-25 RX ORDER — CYANOCOBALAMIN 1000 UG/ML
1000 INJECTION INTRAMUSCULAR; SUBCUTANEOUS ONCE
Status: CANCELLED | OUTPATIENT
Start: 2022-06-22

## 2022-05-25 RX ORDER — CYANOCOBALAMIN 1000 UG/ML
1000 INJECTION INTRAMUSCULAR; SUBCUTANEOUS ONCE
Status: COMPLETED | OUTPATIENT
Start: 2022-05-25 | End: 2022-05-25

## 2022-05-25 RX ADMIN — CYANOCOBALAMIN 1000 MCG: 1000 INJECTION, SOLUTION INTRAMUSCULAR at 08:22

## 2022-05-25 NOTE — PROGRESS NOTES
B12 injection given into left deltoid  Band aid applied  Next appts scheduled  Pt left ambulatory with steady gait for d/c

## 2022-06-22 ENCOUNTER — TELEPHONE (OUTPATIENT)
Dept: HEMATOLOGY ONCOLOGY | Facility: CLINIC | Age: 52
End: 2022-06-22

## 2022-06-22 ENCOUNTER — HOSPITAL ENCOUNTER (OUTPATIENT)
Dept: INFUSION CENTER | Facility: HOSPITAL | Age: 52
Discharge: HOME | End: 2022-06-22
Attending: INTERNAL MEDICINE
Payer: COMMERCIAL

## 2022-06-22 ENCOUNTER — TELEPHONE (OUTPATIENT)
Dept: HEMATOLOGY ONCOLOGY | Facility: HOSPITAL | Age: 52
End: 2022-06-22

## 2022-06-22 VITALS
RESPIRATION RATE: 14 BRPM | TEMPERATURE: 97.1 F | DIASTOLIC BLOOD PRESSURE: 72 MMHG | OXYGEN SATURATION: 96 % | HEART RATE: 103 BPM | SYSTOLIC BLOOD PRESSURE: 116 MMHG

## 2022-06-22 DIAGNOSIS — D50.9 IRON DEFICIENCY ANEMIA, UNSPECIFIED IRON DEFICIENCY ANEMIA TYPE: ICD-10-CM

## 2022-06-22 DIAGNOSIS — D50.9 IRON DEFICIENCY ANEMIA, UNSPECIFIED IRON DEFICIENCY ANEMIA TYPE: Primary | ICD-10-CM

## 2022-06-22 DIAGNOSIS — E53.8 B12 DEFICIENCY: ICD-10-CM

## 2022-06-22 DIAGNOSIS — E53.8 B12 DEFICIENCY: Primary | ICD-10-CM

## 2022-06-22 PROCEDURE — 96372 THER/PROPH/DIAG INJ SC/IM: CPT

## 2022-06-22 RX ORDER — CYANOCOBALAMIN 1000 UG/ML
1000 INJECTION INTRAMUSCULAR; SUBCUTANEOUS ONCE
OUTPATIENT
Start: 2022-07-20

## 2022-06-22 RX ORDER — CYANOCOBALAMIN 1000 UG/ML
1000 INJECTION INTRAMUSCULAR; SUBCUTANEOUS ONCE
Status: COMPLETED | OUTPATIENT
Start: 2022-06-22 | End: 2022-06-22

## 2022-06-22 RX ORDER — AMLODIPINE BESYLATE AND ATORVASTATIN CALCIUM 10; 10 MG/1; MG/1
1 TABLET, FILM COATED ORAL DAILY
COMMUNITY

## 2022-06-22 RX ADMIN — CYANOCOBALAMIN 1000 MCG: 1000 INJECTION, SOLUTION INTRAMUSCULAR at 08:37

## 2022-06-22 NOTE — PROGRESS NOTES
Dr Marly Landry states ok to give July B12 injection since pt has an appt scheduled for August   Pt will also get labs prior to Dr arceo

## 2022-06-22 NOTE — PROGRESS NOTES
Pt arrived amb for B12 injection  Pt has not had Dr barajas since 3/2021  Current order for B12 was signed 5/2021  Michael Brooks from Dr Demond Burt contacted, we may treat today but none after today until pt sees Dr Demond Burt or KENDY Burt office will fang pt and she will need to get labs prior to Dr barajas  Pt and daughter informed  B12 given IM Lt deltoid, dsd applied  Pt disch amb to home, steady gait    Did not want AVS

## 2022-06-22 NOTE — TELEPHONE ENCOUNTER
Called patient and spoke to patient  Scheduled F/U appt with patient  Patient confirmed the appt date and time and location

## 2022-08-18 ENCOUNTER — TELEPHONE (OUTPATIENT)
Dept: HEMATOLOGY ONCOLOGY | Facility: CLINIC | Age: 52
End: 2022-08-18

## 2022-08-18 NOTE — PROGRESS NOTES
Hematology/Oncology Outpatient Follow- up Note  Marcelle Newman 1970, [id number]  8/22/2022        Chief Complaint   Patient presents with    Follow-up       HPI:  Blanca Baum is a 45year old female with a history of hypertension, obesity, tobacco abuse, gastric bypass surgery in 2003 and iron deficiency anemia and B 12 deficiency  She did undergo full GI work up in 7/2020 which was negative for GI blood loss  She is suspected to have malabsorption from previous bariatric surgery resulting in her iron deficiency  Previous Hematologic/ Oncologic History:    IV Venofer 200 mg x 10 doses in 2020  B 12  Monthly B 12     Current Hematologic/ Oncologic Treatment:    Oral iron and oral B 12     Interval History:   The patient presents for routine follow up  She was last seen by Dr Marty Snowden on 3/3/2021  She was due to be seen 6 months later but has been lost to follow up  She has continued to receive B12 every month up until 6/22/22  She no showed her July appointment     Most recent blood work completed on 8/19 was reviewed  CBC and diff is normal  Iron panel is normal   MMA is pending  Patient reports she has been taking oral iron and B 12 supplementation  Patient denies any concerning symptoms today  She feels as though she tends to bruise easily  There is no evidence of excessive bruising seen on exam  No reports of abnormal bleeding  Platelet count is normal  She has multiple tattoos performed without issue  Test Results:    Imaging: No results found      Labs:   Lab Results   Component Value Date    WBC 7 26 08/19/2022    HGB 15 1 08/19/2022    HCT 46 6 (H) 08/19/2022    MCV 98 08/19/2022     08/19/2022     Lab Results   Component Value Date     08/25/2014    K 3 8 08/19/2022     08/19/2022    CO2 27 08/19/2022    ANIONGAP 6 08/25/2014    BUN 23 08/19/2022    CREATININE 0 86 08/19/2022    GLUCOSE 96 08/25/2014    GLUF 99 08/19/2022    CALCIUM 9 9 08/19/2022    CORRECTEDCA 10  4 (H) 2022    AST 19 2022    ALT 19 2022    ALKPHOS 104 2022    PROT 7 4 2014    BILITOT 0 42 2014    EGFR 76 2022           Review of Systems   All other systems reviewed and are negative  Active Problems:   Patient Active Problem List   Diagnosis    Onychomycosis    Obesity (BMI 35 0-39 9 without comorbidity)    Iron deficiency anemia, unspecified    Chronic pain of both knees    Hordeolum externum of left upper eyelid    Primary osteoarthritis of both knees    Seasonal allergies    B12 deficiency    Pain and swelling of knee, right    Rib pain on left side    Primary hypertension       Past Medical History:   Past Medical History:   Diagnosis Date    Anemia     iron def with 12 weeks of infusions    Anxiety     Arthritis     knee    Asthma     Bipolar disorder ([address] )     Chronic pain disorder     knee    Hypertension     Kidney stone         Migraine     Palpitations     Pneumonia 2010    Psychiatric disorder        Surgical History:   Past Surgical History:   Procedure Laterality Date    BREAST BIOPSY Right 2020     SECTION      CHOLECYSTECTOMY      FEMUR FRACTURE SURGERY      FRACTURE SURGERY      Treatment of fractures of both radial and ulnar shafts     FRACTURE SURGERY      Treatment pf tibial shaft fracture with plate/screws    GASTRIC BYPASS      HERNIA REPAIR      HYSTERECTOMY      SHOULDER SURGERY Right     US GUIDED BREAST BIOPSY RIGHT COMPLETE Right 3/13/2020       Family History:    Family History   Problem Relation Age of Onset    Diabetes Father     Hypertension Father     Diabetes Maternal Grandmother     Hypertension Maternal Grandmother     Breast cancer Maternal Aunt     Ovarian cancer Maternal Aunt     Thyroid disease Mother     Hernia Mother        Cancer-related family history includes Breast cancer in her maternal aunt; Ovarian cancer in her maternal aunt      Social History: Social History     Socioeconomic History    Marital status: /Civil Union     Spouse name: Not on file    Number of children: Not on file    Years of education: Not on file    Highest education level: Not on file   Occupational History    Not on file   Tobacco Use    Smoking status: Current Every Day Smoker     Packs/day: 0 50    Smokeless tobacco: Never Used   Vaping Use    Vaping Use: Never used   Substance and Sexual Activity    Alcohol use: Not Currently    Drug use: Never    Sexual activity: Yes     Partners: Male   Other Topics Concern    Not on file   Social History Narrative    ** Merged History Encounter **         Never drank alcohol - As per [facility]      Social Determinants of Health     Financial Resource Strain: Not on file   Food Insecurity: Not on file   Transportation Needs: Not on file   Physical Activity: Not on file   Stress: Not on file   Social Connections: Not on file   Intimate Partner Violence: Not on file   Housing Stability: Not on file       Current Medications:   Current Outpatient Medications   Medication Sig Dispense Refill    albuterol (PROVENTIL HFA,VENTOLIN HFA) 90 mcg/act inhaler Inhale 2 puffs every 6 (six) hours as needed for wheezing or shortness of breath 1 Inhaler 0    amLODIPine-atorvastatin (CADUET) 10-10 MG per tablet Take 1 tablet by mouth daily      Diclofenac Sodium (VOLTAREN) 1 % Apply 2 g topically 4 (four) times a day 150 g 1    ibuprofen (MOTRIN) 800 mg tablet Take 1 tablet (800 mg total) by mouth every 6 (six) hours as needed for mild pain or moderate pain 30 tablet 1    lisinopril (ZESTRIL) 20 mg tablet TAKE 1 TABLET BY MOUTH EVERY DAY 90 tablet 0    loratadine (CLARITIN) 10 mg tablet TAKE 1 TABLET BY MOUTH EVERY DAY 90 tablet 0    LORazepam (ATIVAN) 0 5 mg tablet Take 0 5 mg by mouth daily as needed  2    methocarbamol (Robaxin-750) 750 mg tablet Take 1 tablet (750 mg total) by mouth every 6 (six) hours as needed for muscle spasms 30 tablet 1    QUEtiapine (SEROquel) 100 mg tablet Take 100 mg by mouth daily at bedtime      venlafaxine (EFFEXOR-XR) 150 mg 24 hr capsule Take 150 mg by mouth daily      venlafaxine (EFFEXOR-XR) 75 mg 24 hr capsule Take 75 mg by mouth daily      amoxicillin (AMOXIL) 500 mg capsule TAKE 1 CAPSULE BY MOUTH 3 TIMES A DAY UNTIL GONE (Patient not taking: Reported on 8/4/2021)      benzonatate (TESSALON PERLES) 100 mg capsule Take 1 capsule (100 mg total) by mouth 3 (three) times a day as needed for cough (Patient not taking: No sig reported) 30 capsule 0    bisacodyl (DULCOLAX) 5 mg EC tablet Take 2 tablets (10 mg total) by mouth once for 1 dose 2 tablet 0    chlorhexidine (PERIDEX) 0 12 % solution SWISH AND SPIT 15MLS TWICE DAY (Patient not taking: No sig reported)      ferrous sulfate 325 (65 Fe) mg tablet Take 1 tablet (325 mg total) by mouth daily with breakfast 30 tablet 0    fluticasone (FLONASE) 50 mcg/act nasal spray 1 spray into each nostril daily (Patient not taking: No sig reported) 1 Bottle 2    guaiFENesin (ROBITUSSIN) 100 MG/5ML oral liquid Take 10 mL (200 mg total) by mouth 3 (three) times a day as needed for cough (Patient not taking: Reported on 3/11/2021) 120 mL 0    HYDROcodone-acetaminophen (NORCO) 5-325 mg per tablet TAKE 1 TABLET BY MOUTH EVERY 4 HOURS AS NEEDED FOR DISCOMFORT (Patient not taking: Reported on 8/4/2021)      ketotifen (ZADITOR) 0 025 % ophthalmic solution Administer 1 drop to both eyes 2 (two) times a day (Patient not taking: Reported on 3/11/2021) 5 mL 0    polyethylene glycol (GOLYTELY) 4000 mL solution Take 4,000 mL by mouth once for 1 dose 4000 mL 0    tobramycin in sterile water-balanced salts ophthalmic solution Apply 1-2 drops to eye every 4 (four) hours (Patient not taking: No sig reported) 7 mL 0    triamcinolone (KENALOG) 0 1 % cream Apply topically 2 (two) times a day (Patient not taking: Reported on 8/22/2022) 30 g 0    venlafaxine (EFFEXOR) 50 mg tablet Take 225 mg by mouth daily  (Patient not taking: No sig reported)       No current facility-administered medications for this visit  Allergies: Allergies   Allergen Reactions    Codeine     Latex     Morphine     Morphine And Related     Sulfa Antibiotics        Physical Exam:  /80 (BP Location: Right arm, Patient Position: Sitting, Cuff Size: Large)   Pulse (!) 112   Temp (!) 96 1 °F (35 6 °C) (Tympanic)   Resp 16   Ht 5' 3" (1 6 m)   Wt 123 kg (271 lb)   SpO2 94%   BMI 48 01 kg/m²   Body surface area is 2 2 meters squared  Wt Readings from Last 3 Encounters:   08/22/22 123 kg (271 lb)   10/07/21 123 kg (271 lb 14 4 oz)   08/04/21 121 kg (266 lb)           Physical Exam  Constitutional:       General: She is not in acute distress  Appearance: Normal appearance  She is obese  HENT:      Head: Normocephalic and atraumatic  Eyes:      General: No scleral icterus  Right eye: No discharge  Left eye: No discharge  Conjunctiva/sclera: Conjunctivae normal    Cardiovascular:      Rate and Rhythm: Normal rate and regular rhythm  Pulmonary:      Effort: Pulmonary effort is normal  No respiratory distress  Breath sounds: Normal breath sounds  Chest:   Breasts:      Right: No axillary adenopathy or supraclavicular adenopathy  Left: No axillary adenopathy or supraclavicular adenopathy  Abdominal:      General: Bowel sounds are normal  There is no distension  Palpations: Abdomen is soft  There is no mass  Tenderness: There is no abdominal tenderness  Musculoskeletal:         General: Normal range of motion  Lymphadenopathy:      Cervical: No cervical adenopathy  Upper Body:      Right upper body: No supraclavicular, axillary or pectoral adenopathy  Left upper body: No supraclavicular, axillary or pectoral adenopathy  Skin:     General: Skin is warm and dry  Findings: No bruising        Comments: Multiple tattoos present Neurological:      General: No focal deficit present  Mental Status: She is alert and oriented to person, place, and time  Psychiatric:         Mood and Affect: Mood normal          Behavior: Behavior normal          Assessment / Plan:    1  Iron deficiency anemia, unspecified iron deficiency anemia type    2  B12 deficiency      The patient is a 45year old female with a history of hypertension, obesity, tobacco abuse, gastric bypass surgery in 2003 and iron deficiency anemia and B 12 deficiency  She did undergo full GI work up in 7/2020 which was negative for GI blood loss  She was suspected to have malabsorption from previous bariatric surgery resulting in her iron deficiency  She has been treated with IV Venofer and B12 in the past   More recently, she was continued on maintenance B12 injections every month  She last received B12 injection in June 2020 to  Patient reports she is taking oral iron and B12 replacement  Most recent blood work demonstrates normal CBC and differential, normal iron panel  MMA is still pending  Overall, patient is feeling well and denies any concerning symptoms of recurrent iron/B12 deficiency  I discussed keeping her on observation since her blood work is so stable  She will continue on oral iron and B12 replacement  I will notify her of her MMA once finalized  Patient was in agreement with this plan of care  I have discontinued orders for monthly B12 injections  If her MMA does result elevated I will reorder  Otherwise, I will see her back in 6 months with repeat blood work  She is instructed to call at any time with questions or concerning symptoms  Barriers: None  Patient  able to self care  Portions of the record may have been created with voice recognition software  Occasional wrong word or "sound a like" substitutions may have occurred due to the inherent limitations of voice recognition software    Read the chart carefully and recognize, using context, where substitutions have occurred

## 2022-08-19 ENCOUNTER — APPOINTMENT (OUTPATIENT)
Dept: LAB | Facility: CLINIC | Age: 52
End: 2022-08-19
Payer: COMMERCIAL

## 2022-08-19 DIAGNOSIS — D50.9 IRON DEFICIENCY ANEMIA, UNSPECIFIED IRON DEFICIENCY ANEMIA TYPE: ICD-10-CM

## 2022-08-19 DIAGNOSIS — E53.8 B12 DEFICIENCY: ICD-10-CM

## 2022-08-19 LAB
ALBUMIN SERPL BCP-MCNC: 3.4 G/DL (ref 3.5–5)
ALP SERPL-CCNC: 104 U/L (ref 46–116)
ALT SERPL W P-5'-P-CCNC: 19 U/L (ref 12–78)
ANION GAP SERPL CALCULATED.3IONS-SCNC: 6 MMOL/L (ref 4–13)
AST SERPL W P-5'-P-CCNC: 19 U/L (ref 5–45)
BASOPHILS # BLD AUTO: 0.07 THOUSANDS/ΜL (ref 0–0.1)
BASOPHILS NFR BLD AUTO: 1 % (ref 0–1)
BILIRUB SERPL-MCNC: 0.31 MG/DL (ref 0.2–1)
BUN SERPL-MCNC: 23 MG/DL (ref 5–25)
CALCIUM ALBUM COR SERPL-MCNC: 10.4 MG/DL (ref 8.3–10.1)
CALCIUM SERPL-MCNC: 9.9 MG/DL (ref 8.3–10.1)
CHLORIDE SERPL-SCNC: 108 MMOL/L (ref 96–108)
CO2 SERPL-SCNC: 27 MMOL/L (ref 21–32)
CREAT SERPL-MCNC: 0.86 MG/DL (ref 0.6–1.3)
EOSINOPHIL # BLD AUTO: 0.15 THOUSAND/ΜL (ref 0–0.61)
EOSINOPHIL NFR BLD AUTO: 2 % (ref 0–6)
ERYTHROCYTE [DISTWIDTH] IN BLOOD BY AUTOMATED COUNT: 13 % (ref 11.6–15.1)
FERRITIN SERPL-MCNC: 31 NG/ML (ref 8–388)
GFR SERPL CREATININE-BSD FRML MDRD: 76 ML/MIN/1.73SQ M
GLUCOSE P FAST SERPL-MCNC: 99 MG/DL (ref 65–99)
HCT VFR BLD AUTO: 46.6 % (ref 34.8–46.1)
HGB BLD-MCNC: 15.1 G/DL (ref 11.5–15.4)
IMM GRANULOCYTES # BLD AUTO: 0.03 THOUSAND/UL (ref 0–0.2)
IMM GRANULOCYTES NFR BLD AUTO: 0 % (ref 0–2)
IRON SATN MFR SERPL: 26 % (ref 15–50)
IRON SERPL-MCNC: 82 UG/DL (ref 50–170)
LYMPHOCYTES # BLD AUTO: 2.22 THOUSANDS/ΜL (ref 0.6–4.47)
LYMPHOCYTES NFR BLD AUTO: 31 % (ref 14–44)
MCH RBC QN AUTO: 31.6 PG (ref 26.8–34.3)
MCHC RBC AUTO-ENTMCNC: 32.4 G/DL (ref 31.4–37.4)
MCV RBC AUTO: 98 FL (ref 82–98)
MONOCYTES # BLD AUTO: 0.68 THOUSAND/ΜL (ref 0.17–1.22)
MONOCYTES NFR BLD AUTO: 9 % (ref 4–12)
NEUTROPHILS # BLD AUTO: 4.11 THOUSANDS/ΜL (ref 1.85–7.62)
NEUTS SEG NFR BLD AUTO: 57 % (ref 43–75)
NRBC BLD AUTO-RTO: 0 /100 WBCS
PLATELET # BLD AUTO: 259 THOUSANDS/UL (ref 149–390)
PMV BLD AUTO: 9.2 FL (ref 8.9–12.7)
POTASSIUM SERPL-SCNC: 3.8 MMOL/L (ref 3.5–5.3)
PROT SERPL-MCNC: 7.2 G/DL (ref 6.4–8.4)
RBC # BLD AUTO: 4.78 MILLION/UL (ref 3.81–5.12)
SODIUM SERPL-SCNC: 141 MMOL/L (ref 135–147)
TIBC SERPL-MCNC: 317 UG/DL (ref 250–450)
WBC # BLD AUTO: 7.26 THOUSAND/UL (ref 4.31–10.16)

## 2022-08-19 PROCEDURE — 83550 IRON BINDING TEST: CPT

## 2022-08-19 PROCEDURE — 83918 ORGANIC ACIDS TOTAL QUANT: CPT

## 2022-08-19 PROCEDURE — 82728 ASSAY OF FERRITIN: CPT

## 2022-08-19 PROCEDURE — 85025 COMPLETE CBC W/AUTO DIFF WBC: CPT

## 2022-08-19 PROCEDURE — 36415 COLL VENOUS BLD VENIPUNCTURE: CPT

## 2022-08-19 PROCEDURE — 83540 ASSAY OF IRON: CPT

## 2022-08-19 PROCEDURE — 80053 COMPREHEN METABOLIC PANEL: CPT

## 2022-08-22 ENCOUNTER — OFFICE VISIT (OUTPATIENT)
Dept: HEMATOLOGY ONCOLOGY | Facility: HOSPITAL | Age: 52
End: 2022-08-22
Payer: COMMERCIAL

## 2022-08-22 VITALS
WEIGHT: 271 LBS | TEMPERATURE: 96.1 F | DIASTOLIC BLOOD PRESSURE: 80 MMHG | SYSTOLIC BLOOD PRESSURE: 120 MMHG | OXYGEN SATURATION: 94 % | BODY MASS INDEX: 48.02 KG/M2 | HEART RATE: 112 BPM | RESPIRATION RATE: 16 BRPM | HEIGHT: 63 IN

## 2022-08-22 DIAGNOSIS — D50.9 IRON DEFICIENCY ANEMIA, UNSPECIFIED IRON DEFICIENCY ANEMIA TYPE: Primary | ICD-10-CM

## 2022-08-22 DIAGNOSIS — E53.8 B12 DEFICIENCY: ICD-10-CM

## 2022-08-22 PROCEDURE — 99214 OFFICE O/P EST MOD 30 MIN: CPT | Performed by: NURSE PRACTITIONER

## 2022-08-25 LAB — METHYLMALONATE SERPL-SCNC: 115 NMOL/L (ref 0–378)

## 2023-02-17 ENCOUNTER — TELEPHONE (OUTPATIENT)
Dept: HEMATOLOGY ONCOLOGY | Facility: CLINIC | Age: 53
End: 2023-02-17

## 2023-02-17 DIAGNOSIS — E53.8 B12 DEFICIENCY: ICD-10-CM

## 2023-02-17 DIAGNOSIS — D50.9 IRON DEFICIENCY ANEMIA, UNSPECIFIED IRON DEFICIENCY ANEMIA TYPE: Primary | ICD-10-CM

## 2023-02-17 NOTE — TELEPHONE ENCOUNTER
02/17/23    Spoke with patient reminding updated labs prior to appt  DHAVAL and B12 deficiency anemia follow-up    Advising pt to go to any [facility] walk-in labs to get blood work done  Pt stated she will get labs done tomorrow 
solids

## 2023-02-19 NOTE — PROGRESS NOTES
Hematology/Oncology Outpatient Follow- up Note  Dasha Sharp 1970, [id number]  2/22/2023        Chief Complaint   Patient presents with   • Follow-up       HPI:  Yakov Roberts is a 45year old female with a history of hypertension, obesity, tobacco abuse, gastric bypass surgery in 2003 and iron deficiency anemia and B 12 deficiency  She did undergo full GI work up in 7/2020 which was negative for GI blood loss  She is suspected to have malabsorption from previous bariatric surgery resulting in her iron deficiency  Previous Hematologic/ Oncologic History:    IV Venofer 200 mg x 10 doses in 2020  B 12  Monthly B 12     Current Hematologic/ Oncologic Treatment:    Oral iron and oral B 12     Interval History:   The patient presents for routine follow up  She was last seen on 8/22/22  She continues to take oral iron and B12 without any reported side effects  Denies any GI issues or constipation  Overall, she states she is doing well  Most recent blood work completed on 2/20/2023 reviewed  CBC with differential is normal   Iron panel is normal   CMP unremarkable  MMA pending    Test Results:    Imaging: No results found  Labs:   Lab Results   Component Value Date    WBC 6 79 02/20/2023    HGB 13 7 02/20/2023    HCT 42 5 02/20/2023    MCV 96 02/20/2023     02/20/2023     Lab Results   Component Value Date     08/25/2014    K 3 5 02/20/2023     (H) 02/20/2023    CO2 25 02/20/2023    ANIONGAP 6 08/25/2014    BUN 21 02/20/2023    CREATININE 0 75 02/20/2023    GLUCOSE 96 08/25/2014    GLUF 84 02/20/2023    CALCIUM 9 5 02/20/2023    CORRECTEDCA 10 4 (H) 08/19/2022    AST 18 02/20/2023    ALT 18 02/20/2023    ALKPHOS 102 02/20/2023    PROT 7 4 08/25/2014    BILITOT 0 42 08/25/2014    EGFR 90 02/20/2023           Review of Systems   All other systems reviewed and are negative          Active Problems:   Patient Active Problem List   Diagnosis   • Onychomycosis   • Obesity (BMI 35 0-39 9 without comorbidity)   • Iron deficiency anemia, unspecified   • Chronic pain of both knees   • Hordeolum externum of left upper eyelid   • Primary osteoarthritis of both knees   • Seasonal allergies   • B12 deficiency   • Pain and swelling of knee, right   • Rib pain on left side   • Primary hypertension       Past Medical History:   Past Medical History:   Diagnosis Date   • Anemia 2020    iron def with 12 weeks of infusions   • Anxiety    • Arthritis     knee   • Asthma    • Bipolar disorder ([facility] [address] )    • Chronic pain disorder     knee   • Hypertension    • Kidney stone        • Migraine    • Palpitations    • Pneumonia    • Psychiatric disorder        Surgical History:   Past Surgical History:   Procedure Laterality Date   • BREAST BIOPSY Right 2020   •  SECTION     • CHOLECYSTECTOMY     • FEMUR FRACTURE SURGERY     • FRACTURE SURGERY      Treatment of fractures of both radial and ulnar shafts    • FRACTURE SURGERY      Treatment pf tibial shaft fracture with plate/screws   • GASTRIC BYPASS     • HERNIA REPAIR     • HYSTERECTOMY     • SHOULDER SURGERY Right    • US GUIDED BREAST BIOPSY RIGHT COMPLETE Right 3/13/2020       Family History:    Family History   Problem Relation Age of Onset   • Diabetes Father    • Hypertension Father    • Diabetes Maternal Grandmother    • Hypertension Maternal Grandmother    • Breast cancer Maternal Aunt    • Ovarian cancer Maternal Aunt    • Thyroid disease Mother    • Hernia Mother        Cancer-related family history includes Breast cancer in her maternal aunt; Ovarian cancer in her maternal aunt      Social History:   Social History     Socioeconomic History   • Marital status: /Civil Union     Spouse name: Not on file   • Number of children: Not on file   • Years of education: Not on file   • Highest education level: Not on file   Occupational History   • Not on file   Tobacco Use   • Smoking status: Every Day     Packs/day: 0 50     Types: Cigarettes   • Smokeless tobacco: Never   Vaping Use   • Vaping Use: Never used   Substance and Sexual Activity   • Alcohol use: Not Currently   • Drug use: Never   • Sexual activity: Yes     Partners: Male   Other Topics Concern   • Not on file   Social History Narrative    ** Merged History Encounter **         Never drank alcohol - As per [facility]      Social Determinants of Health     Financial Resource Strain: Not on file   Food Insecurity: Not on file   Transportation Needs: Not on file   Physical Activity: Not on file   Stress: Not on file   Social Connections: Not on file   Intimate Partner Violence: Not on file   Housing Stability: Not on file       Current Medications:   Current Outpatient Medications   Medication Sig Dispense Refill   • albuterol (PROVENTIL HFA,VENTOLIN HFA) 90 mcg/act inhaler Inhale 2 puffs every 6 (six) hours as needed for wheezing or shortness of breath 1 Inhaler 0   • amLODIPine-atorvastatin (CADUET) 10-10 MG per tablet Take 1 tablet by mouth daily     • bisacodyl (DULCOLAX) 5 mg EC tablet Take 2 tablets (10 mg total) by mouth once for 1 dose 2 tablet 0   • ferrous sulfate 325 (65 Fe) mg tablet Take 1 tablet (325 mg total) by mouth daily with breakfast 30 tablet 0   • HYDROcodone-acetaminophen (NORCO) 5-325 mg per tablet TAKE 1 TABLET BY MOUTH EVERY 4 HOURS AS NEEDED FOR DISCOMFORT     • ibuprofen (MOTRIN) 800 mg tablet Take 1 tablet (800 mg total) by mouth every 6 (six) hours as needed for mild pain or moderate pain 30 tablet 1   • lisinopril (ZESTRIL) 20 mg tablet TAKE 1 TABLET BY MOUTH EVERY DAY 90 tablet 0   • loratadine (CLARITIN) 10 mg tablet TAKE 1 TABLET BY MOUTH EVERY DAY 90 tablet 0   • LORazepam (ATIVAN) 0 5 mg tablet Take 0 5 mg by mouth daily as needed  2   • QUEtiapine (SEROquel) 100 mg tablet Take 100 mg by mouth daily at bedtime     • venlafaxine (EFFEXOR-XR) 150 mg 24 hr capsule Take 150 mg by mouth daily     • venlafaxine (EFFEXOR-XR) 75 mg 24 hr capsule Take 75 mg by mouth daily     • amoxicillin (AMOXIL) 500 mg capsule TAKE 1 CAPSULE BY MOUTH 3 TIMES A DAY UNTIL GONE (Patient not taking: Reported on 8/4/2021)     • benzonatate (TESSALON PERLES) 100 mg capsule Take 1 capsule (100 mg total) by mouth 3 (three) times a day as needed for cough (Patient not taking: No sig reported) 30 capsule 0   • chlorhexidine (PERIDEX) 0 12 % solution SWISH AND SPIT 15MLS TWICE DAY (Patient not taking: No sig reported)     • Diclofenac Sodium (VOLTAREN) 1 % Apply 2 g topically 4 (four) times a day (Patient not taking: Reported on 2/22/2023) 150 g 1   • fluticasone (FLONASE) 50 mcg/act nasal spray 1 spray into each nostril daily (Patient not taking: No sig reported) 1 Bottle 2   • guaiFENesin (ROBITUSSIN) 100 MG/5ML oral liquid Take 10 mL (200 mg total) by mouth 3 (three) times a day as needed for cough (Patient not taking: Reported on 3/11/2021) 120 mL 0   • ketotifen (ZADITOR) 0 025 % ophthalmic solution Administer 1 drop to both eyes 2 (two) times a day (Patient not taking: Reported on 3/11/2021) 5 mL 0   • methocarbamol (Robaxin-750) 750 mg tablet Take 1 tablet (750 mg total) by mouth every 6 (six) hours as needed for muscle spasms (Patient not taking: Reported on 2/22/2023) 30 tablet 1   • polyethylene glycol (GOLYTELY) 4000 mL solution Take 4,000 mL by mouth once for 1 dose (Patient not taking: Reported on 2/22/2023) 4000 mL 0   • tobramycin in sterile water-balanced salts ophthalmic solution Apply 1-2 drops to eye every 4 (four) hours (Patient not taking: No sig reported) 7 mL 0   • triamcinolone (KENALOG) 0 1 % cream Apply topically 2 (two) times a day (Patient not taking: Reported on 8/22/2022) 30 g 0   • venlafaxine (EFFEXOR) 50 mg tablet Take 225 mg by mouth daily  (Patient not taking: Reported on 6/22/2022)       No current facility-administered medications for this visit  Allergies:    Allergies   Allergen Reactions   • Codeine    • Latex    • Morphine    • Morphine And Related • Sulfa Antibiotics        Physical Exam:  /76 (BP Location: Left arm, Patient Position: Sitting, Cuff Size: Adult)   Pulse 85   Temp 98 °F (36 7 °C) (Temporal)   Resp 14   Ht 5' 3" (1 6 m)   Wt 122 kg (270 lb)   SpO2 95%   BMI 47 83 kg/m²   Body surface area is 2 19 meters squared  Wt Readings from Last 3 Encounters:   02/22/23 122 kg (270 lb)   08/22/22 123 kg (271 lb)   10/07/21 123 kg (271 lb 14 4 oz)           Physical Exam  Constitutional:       General: She is not in acute distress  Appearance: Normal appearance  She is obese  HENT:      Head: Normocephalic and atraumatic  Eyes:      General: No scleral icterus  Right eye: No discharge  Left eye: No discharge  Conjunctiva/sclera: Conjunctivae normal    Cardiovascular:      Rate and Rhythm: Normal rate and regular rhythm  Pulmonary:      Effort: Pulmonary effort is normal  No respiratory distress  Breath sounds: Normal breath sounds  Abdominal:      General: Bowel sounds are normal  There is no distension  Palpations: Abdomen is soft  There is no mass  Tenderness: There is no abdominal tenderness  Musculoskeletal:         General: Normal range of motion  Lymphadenopathy:      Cervical: No cervical adenopathy  Upper Body:      Right upper body: No supraclavicular, axillary or pectoral adenopathy  Left upper body: No supraclavicular, axillary or pectoral adenopathy  Skin:     General: Skin is warm and dry  Neurological:      General: No focal deficit present  Mental Status: She is alert and oriented to person, place, and time  Psychiatric:         Mood and Affect: Mood normal          Behavior: Behavior normal          Assessment / Plan:    1  Iron deficiency anemia, unspecified iron deficiency anemia type    2   B12 deficiency      The patient is a 45year old female with a history of hypertension, obesity, tobacco abuse, gastric bypass surgery in 2003 and iron deficiency anemia and B 12 deficiency  She did undergo full GI work up in 7/2020 which was negative for GI blood loss  She was suspected to have malabsorption from previous bariatric surgery resulting in her iron deficiency  She has been treated with IV Venofer and B12 in the past   More recently, she was continued on maintenance B12 injections every month  She last received B12 injection in June 2022  Maintenance infusions were discontinued due to normalization in her numbers  She has been maintained on oral iron and B12 replacement  Most recent blood work remains normal without any evidence of recurrent anemia or iron deficiency  Patient is doing well without any concerning symptoms today  We will keep her on observation  She will continue on oral iron and B12 replacement since she is tolerating this well and her numbers have stayed in a normal stable range  I will see her back in 6 months with repeat blood work  Patient is in agreement with this plan of care  She knows to call at anytime with questions or concerning symptoms prior to her next visit      Barriers: None  Patient  able to self care  Portions of the record may have been created with voice recognition software  Occasional wrong word or "sound a like" substitutions may have occurred due to the inherent limitations of voice recognition software  Read the chart carefully and recognize, using context, where substitutions have occurred

## 2023-02-20 ENCOUNTER — APPOINTMENT (OUTPATIENT)
Dept: LAB | Facility: CLINIC | Age: 53
End: 2023-02-20

## 2023-02-20 DIAGNOSIS — D50.9 IRON DEFICIENCY ANEMIA, UNSPECIFIED IRON DEFICIENCY ANEMIA TYPE: ICD-10-CM

## 2023-02-20 DIAGNOSIS — E53.8 B12 DEFICIENCY: ICD-10-CM

## 2023-02-20 LAB
ALBUMIN SERPL BCP-MCNC: 3.5 G/DL (ref 3.5–5)
ALP SERPL-CCNC: 102 U/L (ref 46–116)
ALT SERPL W P-5'-P-CCNC: 18 U/L (ref 12–78)
ANION GAP SERPL CALCULATED.3IONS-SCNC: 5 MMOL/L (ref 4–13)
AST SERPL W P-5'-P-CCNC: 18 U/L (ref 5–45)
BASOPHILS # BLD AUTO: 0.09 THOUSANDS/ÂΜL (ref 0–0.1)
BASOPHILS NFR BLD AUTO: 1 % (ref 0–1)
BILIRUB SERPL-MCNC: 0.39 MG/DL (ref 0.2–1)
BUN SERPL-MCNC: 21 MG/DL (ref 5–25)
CALCIUM SERPL-MCNC: 9.5 MG/DL (ref 8.3–10.1)
CHLORIDE SERPL-SCNC: 109 MMOL/L (ref 96–108)
CO2 SERPL-SCNC: 25 MMOL/L (ref 21–32)
CREAT SERPL-MCNC: 0.75 MG/DL (ref 0.6–1.3)
EOSINOPHIL # BLD AUTO: 0.29 THOUSAND/ÂΜL (ref 0–0.61)
EOSINOPHIL NFR BLD AUTO: 4 % (ref 0–6)
ERYTHROCYTE [DISTWIDTH] IN BLOOD BY AUTOMATED COUNT: 12.8 % (ref 11.6–15.1)
FERRITIN SERPL-MCNC: 45 NG/ML (ref 8–388)
GFR SERPL CREATININE-BSD FRML MDRD: 90 ML/MIN/1.73SQ M
GLUCOSE P FAST SERPL-MCNC: 84 MG/DL (ref 65–99)
HCT VFR BLD AUTO: 42.5 % (ref 34.8–46.1)
HGB BLD-MCNC: 13.7 G/DL (ref 11.5–15.4)
IMM GRANULOCYTES # BLD AUTO: 0.02 THOUSAND/UL (ref 0–0.2)
IMM GRANULOCYTES NFR BLD AUTO: 0 % (ref 0–2)
IRON SATN MFR SERPL: 18 % (ref 15–50)
IRON SERPL-MCNC: 59 UG/DL (ref 50–170)
LYMPHOCYTES # BLD AUTO: 2.11 THOUSANDS/ÂΜL (ref 0.6–4.47)
LYMPHOCYTES NFR BLD AUTO: 31 % (ref 14–44)
MCH RBC QN AUTO: 31 PG (ref 26.8–34.3)
MCHC RBC AUTO-ENTMCNC: 32.2 G/DL (ref 31.4–37.4)
MCV RBC AUTO: 96 FL (ref 82–98)
MONOCYTES # BLD AUTO: 0.62 THOUSAND/ÂΜL (ref 0.17–1.22)
MONOCYTES NFR BLD AUTO: 9 % (ref 4–12)
NEUTROPHILS # BLD AUTO: 3.66 THOUSANDS/ÂΜL (ref 1.85–7.62)
NEUTS SEG NFR BLD AUTO: 55 % (ref 43–75)
NRBC BLD AUTO-RTO: 0 /100 WBCS
PLATELET # BLD AUTO: 260 THOUSANDS/UL (ref 149–390)
PMV BLD AUTO: 9.7 FL (ref 8.9–12.7)
POTASSIUM SERPL-SCNC: 3.5 MMOL/L (ref 3.5–5.3)
PROT SERPL-MCNC: 7 G/DL (ref 6.4–8.4)
RBC # BLD AUTO: 4.42 MILLION/UL (ref 3.81–5.12)
SODIUM SERPL-SCNC: 139 MMOL/L (ref 135–147)
TIBC SERPL-MCNC: 329 UG/DL (ref 250–450)
WBC # BLD AUTO: 6.79 THOUSAND/UL (ref 4.31–10.16)

## 2023-02-22 ENCOUNTER — OFFICE VISIT (OUTPATIENT)
Dept: HEMATOLOGY ONCOLOGY | Facility: HOSPITAL | Age: 53
End: 2023-02-22

## 2023-02-22 VITALS
BODY MASS INDEX: 47.84 KG/M2 | RESPIRATION RATE: 14 BRPM | HEART RATE: 85 BPM | TEMPERATURE: 98 F | SYSTOLIC BLOOD PRESSURE: 126 MMHG | WEIGHT: 270 LBS | OXYGEN SATURATION: 95 % | DIASTOLIC BLOOD PRESSURE: 76 MMHG | HEIGHT: 63 IN

## 2023-02-22 DIAGNOSIS — D50.9 IRON DEFICIENCY ANEMIA, UNSPECIFIED IRON DEFICIENCY ANEMIA TYPE: Primary | ICD-10-CM

## 2023-02-22 DIAGNOSIS — E53.8 B12 DEFICIENCY: ICD-10-CM

## 2023-03-07 LAB — METHYLMALONATE SERPL-SCNC: 114 NMOL/L (ref 0–378)

## 2023-06-12 ENCOUNTER — OFFICE VISIT (OUTPATIENT)
Dept: URGENT CARE | Facility: CLINIC | Age: 53
End: 2023-06-12
Payer: COMMERCIAL

## 2023-06-12 VITALS
WEIGHT: 258 LBS | DIASTOLIC BLOOD PRESSURE: 99 MMHG | SYSTOLIC BLOOD PRESSURE: 129 MMHG | HEART RATE: 75 BPM | BODY MASS INDEX: 45.71 KG/M2 | OXYGEN SATURATION: 96 % | RESPIRATION RATE: 16 BRPM | HEIGHT: 63 IN | TEMPERATURE: 97.3 F

## 2023-06-12 DIAGNOSIS — R05.1 ACUTE COUGH: ICD-10-CM

## 2023-06-12 DIAGNOSIS — J01.90 ACUTE NON-RECURRENT SINUSITIS, UNSPECIFIED LOCATION: Primary | ICD-10-CM

## 2023-06-12 PROCEDURE — G0383 LEV 4 HOSP TYPE B ED VISIT: HCPCS | Performed by: PHYSICIAN ASSISTANT

## 2023-06-12 RX ORDER — LANOLIN ALCOHOL/MO/W.PET/CERES
CREAM (GRAM) TOPICAL DAILY
COMMUNITY

## 2023-06-12 RX ORDER — AZITHROMYCIN 250 MG/1
TABLET, FILM COATED ORAL
Qty: 6 TABLET | Refills: 0 | Status: SHIPPED | OUTPATIENT
Start: 2023-06-12 | End: 2023-06-16

## 2023-06-12 NOTE — PATIENT INSTRUCTIONS
Patient was educated on sinus infection  Patient was prescribed antibiotics  Patient was told to eat on antibiotics  Any chest pain or shortness of breath go to ED    Sinusitis   WHAT YOU NEED TO KNOW:   Sinusitis is inflammation or infection of your sinuses  Sinusitis is most often caused by a virus  Acute sinusitis may last up to 12 weeks  Chronic sinusitis lasts longer than 12 weeks  Recurrent sinusitis means you have 4 or more infections in 1 year  DISCHARGE INSTRUCTIONS:   Return to the emergency department if:   You have trouble breathing or wheezing that is getting worse  You have a stiff neck, a fever, or a bad headache  You cannot open your eye  Your eyeball bulges out or you cannot move your eye  You are more sleepy than normal, or you notice changes in your ability to think, move, or talk  You have swelling of your forehead or scalp  Call your doctor if:   You have vision changes, such as double vision  Your eye and eyelid are red, swollen, and painful  Your symptoms do not improve or go away after 10 days  You have nausea and are vomiting  Your nose is bleeding  You have questions or concerns about your condition or care  Medicines: Your symptoms may go away on their own  Your healthcare provider may recommend watchful waiting for up to 10 days before starting antibiotics  You may need any of the following:  Acetaminophen  decreases pain and fever  It is available without a doctor's order  Ask how much to take and how often to take it  Follow directions  Read the labels of all other medicines you are using to see if they also contain acetaminophen, or ask your doctor or pharmacist  Acetaminophen can cause liver damage if not taken correctly  NSAIDs , such as ibuprofen, help decrease swelling, pain, and fever  This medicine is available with or without a doctor's order  NSAIDs can cause stomach bleeding or kidney problems in certain people   If you take blood thinner medicine, always ask your healthcare provider if NSAIDs are safe for you  Always read the medicine label and follow directions  Nasal steroid sprays  may help decrease inflammation in your nose and sinuses  Decongestants  help reduce swelling and drain mucus in the nose and sinuses  They may help you breathe easier  Antihistamines  help dry mucus in the nose and relieve sneezing  Antibiotics  help treat or prevent a bacterial infection  Take your medicine as directed  Contact your healthcare provider if you think your medicine is not helping or if you have side effects  Tell your provider if you are allergic to any medicine  Keep a list of the medicines, vitamins, and herbs you take  Include the amounts, and when and why you take them  Bring the list or the pill bottles to follow-up visits  Carry your medicine list with you in case of an emergency  Self-care:   Rinse your sinuses as directed  Use a sinus rinse device to rinse your nasal passages with a saline (salt water) solution or distilled water  Do not use tap water  This will help thin the mucus in your nose and rinse away pollen and dirt  It will also help reduce swelling so you can breathe normally  Use a humidifier  to increase air moisture in your home  This may make it easier for you to breathe and help decrease your cough  Sleep with your head elevated  Place an extra pillow under your head before you go to sleep to help your sinuses drain  Drink liquids as directed  Ask your healthcare provider how much liquid to drink each day and which liquids are best for you  Liquids will thin the mucus in your nose and help it drain  Avoid drinks that contain alcohol or caffeine  Do not smoke, and avoid secondhand smoke  Nicotine and other chemicals in cigarettes and cigars can make your symptoms worse  Ask your healthcare provider for information if you currently smoke and need help to quit   E-cigarettes or smokeless tobacco still contain nicotine  Talk to your healthcare provider before you use these products  Prevent the spread of germs:   Wash your hands often with soap and water  Wash your hands after you use the bathroom, change a child's diaper, or sneeze  Wash your hands before you prepare or eat food  Stay away from people who are sick  Some germs spread easily and quickly through contact  Follow up with your doctor as directed: You may be referred to an ear, nose, and throat specialist  Write down your questions so you remember to ask them during your visits  © Copyright [employer] 2022 Information is for End User's use only and may not be sold, redistributed or otherwise used for commercial purposes  The above information is an  only  It is not intended as medical advice for individual conditions or treatments  Talk to your doctor, nurse or pharmacist before following any medical regimen to see if it is safe and effective for you
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
## 2023-12-18 ENCOUNTER — HOSPITAL ENCOUNTER (EMERGENCY)
Facility: HOSPITAL | Age: 53
Discharge: HOME | End: 2023-12-19
Attending: EMERGENCY MEDICINE | Admitting: EMERGENCY MEDICINE
Payer: COMMERCIAL

## 2023-12-18 VITALS
SYSTOLIC BLOOD PRESSURE: 161 MMHG | OXYGEN SATURATION: 96 % | RESPIRATION RATE: 18 BRPM | HEART RATE: 88 BPM | TEMPERATURE: 98.8 F | DIASTOLIC BLOOD PRESSURE: 109 MMHG

## 2023-12-18 DIAGNOSIS — S99.922A INJURY OF TOE ON LEFT FOOT, INITIAL ENCOUNTER: Primary | ICD-10-CM

## 2023-12-18 DIAGNOSIS — S90.212A SUBUNGUAL HEMATOMA OF GREAT TOE OF LEFT FOOT, INITIAL ENCOUNTER: ICD-10-CM

## 2023-12-18 PROCEDURE — 90471 IMMUNIZATION ADMIN: CPT

## 2023-12-18 PROCEDURE — 99284 EMERGENCY DEPT VISIT MOD MDM: CPT | Performed by: EMERGENCY MEDICINE

## 2023-12-18 PROCEDURE — 99283 EMERGENCY DEPT VISIT LOW MDM: CPT

## 2023-12-18 RX ORDER — IBUPROFEN 600 MG/1
600 TABLET ORAL ONCE
Status: DISCONTINUED | OUTPATIENT
Start: 2023-12-19 | End: 2023-12-18

## 2023-12-18 RX ORDER — ACETAMINOPHEN 325 MG/1
650 TABLET ORAL ONCE
Status: COMPLETED | OUTPATIENT
Start: 2023-12-19 | End: 2023-12-19

## 2023-12-18 RX ORDER — BACITRACIN, NEOMYCIN, POLYMYXIN B 400; 3.5; 5 [USP'U]/G; MG/G; [USP'U]/G
1 OINTMENT TOPICAL ONCE
Status: COMPLETED | OUTPATIENT
Start: 2023-12-19 | End: 2023-12-18

## 2023-12-18 RX ADMIN — BACITRACIN ZINC, NEOMYCIN, POLYMYXIN B SULFAT 1 SMALL APPLICATION: 5000; 3.5; 4 OINTMENT TOPICAL at 23:58

## 2023-12-18 RX ADMIN — TETANUS TOXOID, REDUCED DIPHTHERIA TOXOID AND ACELLULAR PERTUSSIS VACCINE, ADSORBED 0.5 ML: 5; 2.5; 8; 8; 2.5 SUSPENSION INTRAMUSCULAR at 23:58

## 2023-12-19 ENCOUNTER — APPOINTMENT (EMERGENCY)
Dept: RADIOLOGY | Facility: HOSPITAL | Age: 53
End: 2023-12-19
Payer: COMMERCIAL

## 2023-12-19 PROCEDURE — 73660 X-RAY EXAM OF TOE(S): CPT

## 2023-12-19 PROCEDURE — 90715 TDAP VACCINE 7 YRS/> IM: CPT | Performed by: EMERGENCY MEDICINE

## 2023-12-19 RX ADMIN — ACETAMINOPHEN 650 MG: 325 TABLET, FILM COATED ORAL at 00:07

## 2023-12-19 NOTE — ED PROVIDER NOTES
History  Chief Complaint   Patient presents with    Foot Pain     Patient reports injury to left great toenail caused by dog leash.  She reports the injury occurred at approximately 2100.  Denies falling.     54 yo F with PMH Of HTN, migraine, bipolar, anxiety, asthma presents to ED with toenail injury. She was letting the dog out, and somehow the leash got stuck under her nail. It was bleeding, but no longer. Not sure of tetanus. Able to ambulate. No fall or other injury. Occurred about 8pm.      History provided by:  Patient and medical records   used: No    Toe Pain  Location:  Left great  Quality:  Ache  Severity:  Mild  Onset quality:  Sudden  Duration:  4 hours  Timing:  Constant  Progression:  Unchanged  Chronicity:  New  Associated symptoms: no abdominal pain, no chest pain, no congestion, no cough, no diarrhea, no ear pain, no fatigue, no fever, no headaches, no loss of consciousness, no myalgias, no nausea, no rash, no rhinorrhea, no shortness of breath, no sore throat, no vomiting and no wheezing        Prior to Admission Medications   Prescriptions Last Dose Informant Patient Reported? Taking?   Diclofenac Sodium (VOLTAREN) 1 %  Self No No   Sig: Apply 2 g topically 4 (four) times a day   HYDROcodone-acetaminophen (NORCO) 5-325 mg per tablet  Self Yes No   Sig: TAKE 1 TABLET BY MOUTH EVERY 4 HOURS AS NEEDED FOR DISCOMFORT   Patient not taking: Reported on 6/12/2023   LORazepam (ATIVAN) 0.5 mg tablet  Self Yes No   Sig: Take 1 mg by mouth daily as needed   NON FORMULARY  Self Yes No   Sig: Amlodipine-besylate 10mg unknown frequency per patient   QUEtiapine (SEROquel) 100 mg tablet  Self Yes No   Sig: Take 100 mg by mouth daily at bedtime   albuterol (PROVENTIL HFA,VENTOLIN HFA) 90 mcg/act inhaler  Self No No   Sig: Inhale 2 puffs every 6 (six) hours as needed for wheezing or shortness of breath   amLODIPine-atorvastatin (CADUET) 10-10 MG per tablet  Self Yes No   Sig: Take 1 tablet by  mouth daily   Patient not taking: Reported on 2023   amoxicillin (AMOXIL) 500 mg capsule  Self Yes No   Sig: TAKE 1 CAPSULE BY MOUTH 3 TIMES A DAY UNTIL GONE   Patient not taking: Reported on 2021   benzonatate (TESSALON PERLES) 100 mg capsule  Self No No   Sig: Take 1 capsule (100 mg total) by mouth 3 (three) times a day as needed for cough   Patient not taking: No sig reported   bisacodyl (DULCOLAX) 5 mg EC tablet  Self No No   Sig: Take 2 tablets (10 mg total) by mouth once for 1 dose   chlorhexidine (PERIDEX) 0.12 % solution  Self Yes No   Sig: SWISH AND SPIT 15MLS TWICE DAY   Patient not taking: No sig reported   ferrous sulfate 325 (65 Fe) mg tablet  Self No No   Sig: Take 1 tablet (325 mg total) by mouth daily with breakfast   fluticasone (FLONASE) 50 mcg/act nasal spray  Self No No   Si spray into each nostril daily   Patient not taking: No sig reported   guaiFENesin (ROBITUSSIN) 100 MG/5ML oral liquid  Self No No   Sig: Take 10 mL (200 mg total) by mouth 3 (three) times a day as needed for cough   Patient not taking: Reported on 3/11/2021   ibuprofen (MOTRIN) 800 mg tablet  Self No No   Sig: Take 1 tablet (800 mg total) by mouth every 6 (six) hours as needed for mild pain or moderate pain   ketotifen (ZADITOR) 0.025 % ophthalmic solution  Self No No   Sig: Administer 1 drop to both eyes 2 (two) times a day   Patient not taking: Reported on 3/11/2021   lisinopril (ZESTRIL) 20 mg tablet  Self No No   Sig: TAKE 1 TABLET BY MOUTH EVERY DAY   Patient taking differently: 40 mg   loratadine (CLARITIN) 10 mg tablet  Self No No   Sig: TAKE 1 TABLET BY MOUTH EVERY DAY   methocarbamol (Robaxin-750) 750 mg tablet  Self No No   Sig: Take 1 tablet (750 mg total) by mouth every 6 (six) hours as needed for muscle spasms   Patient taking differently: Take 500 mg by mouth every 6 (six) hours as needed for muscle spasms   polyethylene glycol (GOLYTELY) 4000 mL solution   No No   Sig: Take 4,000 mL by mouth once  for 1 dose   Patient not taking: Reported on 2023   tobramycin in sterile water-balanced salts ophthalmic solution  Self No No   Sig: Apply 1-2 drops to eye every 4 (four) hours   Patient not taking: No sig reported   triamcinolone (KENALOG) 0.1 % cream  Self No No   Sig: Apply topically 2 (two) times a day   Patient not taking: Reported on 2022   venlafaxine (EFFEXOR) 50 mg tablet  Self Yes No   Sig: Take 225 mg by mouth daily    Patient not taking: Reported on 2022   venlafaxine (EFFEXOR-XR) 150 mg 24 hr capsule  Self Yes No   Sig: Take 150 mg by mouth daily   venlafaxine (EFFEXOR-XR) 75 mg 24 hr capsule  Self Yes No   Sig: Take 75 mg by mouth daily   vitamin B-12 (VITAMIN B-12) 1,000 mcg tablet   Yes No   Sig: Take by mouth daily      Facility-Administered Medications: None       Past Medical History:   Diagnosis Date    Anemia     iron def with 12 weeks of infusions    Anxiety     Arthritis     knee    Asthma     Bipolar disorder (HCC)     Chronic pain disorder     knee    Hypertension     Kidney stone         Migraine     Palpitations     Pneumonia 2010    Psychiatric disorder        Past Surgical History:   Procedure Laterality Date    BREAST BIOPSY Right 2020     SECTION      CHOLECYSTECTOMY      FEMUR FRACTURE SURGERY      FRACTURE SURGERY      Treatment of fractures of both radial and ulnar shafts     FRACTURE SURGERY      Treatment pf tibial shaft fracture with plate/screws    GASTRIC BYPASS      HERNIA REPAIR      HYSTERECTOMY      SHOULDER SURGERY Right     US GUIDED BREAST BIOPSY RIGHT COMPLETE Right 3/13/2020       Family History   Problem Relation Age of Onset    Thyroid disease Mother     Hernia Mother     Diabetes Father     Hypertension Father     Breast cancer Maternal Aunt     Ovarian cancer Maternal Aunt     Diabetes Maternal Grandmother     Hypertension Maternal Grandmother      I have reviewed and agree with the history as documented.    E-Cigarette/Vaping     E-Cigarette Use Never User      E-Cigarette/Vaping Substances    Nicotine No     THC No     CBD No     Flavoring No      Social History     Tobacco Use    Smoking status: Former     Current packs/day: 0.00     Types: Cigarettes     Quit date: 2023     Years since quittin.6    Smokeless tobacco: Never   Vaping Use    Vaping status: Never Used   Substance Use Topics    Alcohol use: Not Currently    Drug use: Never       Review of Systems   Constitutional:  Negative for appetite change, chills, fatigue and fever.   HENT:  Negative for congestion, ear pain, rhinorrhea, sore throat, trouble swallowing and voice change.    Eyes:  Negative for pain and visual disturbance.   Respiratory:  Negative for cough, chest tightness, shortness of breath and wheezing.    Cardiovascular:  Negative for chest pain, palpitations and leg swelling.   Gastrointestinal:  Negative for abdominal pain, blood in stool, constipation, diarrhea, nausea and vomiting.   Genitourinary:  Negative for difficulty urinating and hematuria.   Musculoskeletal:  Negative for back pain, myalgias, neck pain and neck stiffness.   Skin:  Positive for wound. Negative for rash.   Neurological:  Negative for dizziness, loss of consciousness, syncope, speech difficulty, light-headedness and headaches.   Psychiatric/Behavioral:  Negative for confusion and suicidal ideas.        Physical Exam  Physical Exam  Vitals and nursing note reviewed.   Constitutional:       General: She is not in acute distress.     Appearance: Normal appearance. She is well-developed. She is not ill-appearing, toxic-appearing or diaphoretic.   HENT:      Head: Normocephalic and atraumatic.      Right Ear: External ear normal.      Left Ear: External ear normal.      Nose: Nose normal.   Eyes:      General: No scleral icterus.        Right eye: No discharge.         Left eye: No discharge.      Extraocular Movements: Extraocular movements intact.      Conjunctiva/sclera:  Conjunctivae normal.      Pupils: Pupils are equal, round, and reactive to light.   Neck:      Trachea: No tracheal deviation.   Cardiovascular:      Rate and Rhythm: Normal rate and regular rhythm.      Pulses: Normal pulses.      Heart sounds: Normal heart sounds. No murmur heard.     No friction rub. No gallop.   Pulmonary:      Effort: Pulmonary effort is normal. No respiratory distress.      Breath sounds: Normal breath sounds. No stridor.   Chest:      Chest wall: No tenderness.   Abdominal:      General: Bowel sounds are normal.      Palpations: Abdomen is soft.      Tenderness: There is no abdominal tenderness. There is no right CVA tenderness, left CVA tenderness, guarding or rebound.   Musculoskeletal:         General: No tenderness or deformity. Normal range of motion.      Cervical back: Normal range of motion and neck supple. No tenderness.      Right lower leg: No edema.      Left lower leg: No edema.   Lymphadenopathy:      Cervical: No cervical adenopathy.   Skin:     General: Skin is warm and dry.      Findings: Wound (left great toe, has some mild TTP, dried blood under nail. minimal (<50% subungal hematoma) and some dried blood beneath nail. nail is fairly firmly intact. no active bleeding. see photos.) present. No bruising or rash.   Neurological:      General: No focal deficit present.      Mental Status: She is alert and oriented to person, place, and time.      Cranial Nerves: No cranial nerve deficit.      Sensory: No sensory deficit.      Motor: No weakness.      Coordination: Coordination normal.   Psychiatric:         Mood and Affect: Mood normal.         Behavior: Behavior normal.               Vital Signs  ED Triage Vitals   Temperature Pulse Respirations Blood Pressure SpO2   12/18/23 2145 12/18/23 2145 12/18/23 2145 12/18/23 2145 12/18/23 2145   98.8 °F (37.1 °C) 88 18 (!) 161/109 96 %      Temp Source Heart Rate Source Patient Position - Orthostatic VS BP Location FiO2 (%)   12/18/23  2145 12/18/23 2145 12/18/23 2145 12/18/23 2145 --   Oral Monitor Sitting Left arm       Pain Score       12/19/23 0007       8           Vitals:    12/18/23 2145   BP: (!) 161/109   Pulse: 88   Patient Position - Orthostatic VS: Sitting         Visual Acuity      ED Medications  Medications   tetanus-diphtheria-acellular pertussis (BOOSTRIX) IM injection 0.5 mL (0.5 mL Intramuscular Given 12/18/23 2358)   neomycin-bacitracin-polymyxin b (NEOSPORIN) ointment 1 small application (1 small application Topical Given 12/18/23 2358)   acetaminophen (TYLENOL) tablet 650 mg (650 mg Oral Given 12/19/23 0007)       Diagnostic Studies  Results Reviewed       None                   XR toe great min 2 views LEFT   ED Interpretation by Raissa Manriquez MD (12/19 0018)   No acute fracture or dislocation. No radioopaque FB                 Procedures  Procedures         ED Course  ED Course as of 12/19/23 0018   Mon Dec 18, 2023   2357 I suspect minimal nail avulsion, as nail is still firmly attached. Small subungal hematoma. No active bleeding. Given how firmly nail is attached, I do not suspect significant laceration beneath nail. Will leave nail intact. Tetanus updated. Check xray. Discussed wound care, RICE, and podiatry f/u. RTED if sx worsen.                                               Medical Decision Making  Problems Addressed:  Injury of toe on left foot, initial encounter: acute illness or injury  Subungual hematoma of great toe of left foot, initial encounter: acute illness or injury    Amount and/or Complexity of Data Reviewed  External Data Reviewed: notes.  Radiology: ordered and independent interpretation performed. Decision-making details documented in ED Course.    Risk  OTC drugs.  Prescription drug management.             Disposition  Final diagnoses:   Injury of toe on left foot, initial encounter   Subungual hematoma of great toe of left foot, initial encounter     Time reflects when diagnosis was  documented in both MDM as applicable and the Disposition within this note       Time User Action Codes Description Comment    12/18/2023 11:55 PM Raissa Manriquez Add [S99.922A] Injury of toe on left foot, initial encounter     12/18/2023 11:55 PM Raissa Manriquez Add [S90.212A] Subungual hematoma of great toe of left foot, initial encounter           ED Disposition       ED Disposition   Discharge    Condition   Stable    Date/Time   Mon Dec 18, 2023 11:55 PM    Comment   Patricia Swain discharge to home/self care.                   Follow-up Information       Follow up With Specialties Details Why Contact Info Additional Information     [facility] Emergency Department Emergency Medicine  If symptoms worsen [address] [facility] [id number] [phone] [facility] Emergency Department, [address], [place] [address]    [facility] Schedule an appointment as soon as possible for a visit   [address]  [facility] [address]  [facility] [address] [phone] [place] [address], [facility] [address], [place], [address] [phone]            Patient's Medications   Discharge Prescriptions    No medications on file       No discharge procedures on file.    PDMP Review       None            ED Provider  Electronically Signed by             Raissa Manriquez MD  12/19/23 0018

## 2024-02-01 ENCOUNTER — TELEPHONE (OUTPATIENT)
Age: 54
End: 2024-02-01

## 2024-02-01 NOTE — TELEPHONE ENCOUNTER
Phone call to Patricia regarding having lab work completed for upcoming appointment 2/5/24. Patient stated she needed to reschedule due to her daughter just had surgery. Patient rescheduled to March.

## 2024-03-08 ENCOUNTER — TELEPHONE (OUTPATIENT)
Dept: HEMATOLOGY ONCOLOGY | Facility: CLINIC | Age: 54
End: 2024-03-08

## 2024-03-08 DIAGNOSIS — E53.8 B12 DEFICIENCY: ICD-10-CM

## 2024-03-08 DIAGNOSIS — D50.9 IRON DEFICIENCY ANEMIA, UNSPECIFIED IRON DEFICIENCY ANEMIA TYPE: Primary | ICD-10-CM

## 2024-03-08 LAB
ALBUMIN SERPL-MCNC: 3.8 G/DL (ref 3.5–5.7)
ALP SERPL-CCNC: 95 U/L (ref 35–120)
ALT SERPL-CCNC: 11 U/L
ANION GAP SERPL CALCULATED.3IONS-SCNC: 10 MMOL/L (ref 3–11)
AST SERPL-CCNC: 16 U/L
BASOPHILS # BLD AUTO: 0.1 THOU/CMM (ref 0–0.1)
BASOPHILS NFR BLD AUTO: 1 %
BILIRUB SERPL-MCNC: 0.2 MG/DL (ref 0.2–1)
BUN SERPL-MCNC: 21 MG/DL (ref 7–25)
CALCIUM SERPL-MCNC: 9.3 MG/DL (ref 8.5–10.1)
CHLORIDE SERPL-SCNC: 104 MMOL/L (ref 100–109)
CO2 SERPL-SCNC: 27 MMOL/L (ref 21–31)
CREAT SERPL-MCNC: 0.73 MG/DL (ref 0.4–1.1)
CYTOLOGY CMNT CVX/VAG CYTO-IMP: NORMAL
DIFFERENTIAL METHOD BLD: NORMAL
EOSINOPHIL # BLD AUTO: 0.5 THOU/CMM (ref 0–0.5)
EOSINOPHIL NFR BLD AUTO: 7 %
ERYTHROCYTE [DISTWIDTH] IN BLOOD BY AUTOMATED COUNT: 13.5 % (ref 12–16)
FERRITIN SERPL-MCNC: 17 NG/ML (ref 11–306.8)
GFR/BSA.PRED SERPLBLD CYS-BASED-ARV: 97 ML/MIN/{1.73_M2}
GLUCOSE SERPL-MCNC: 91 MG/DL (ref 65–99)
HCT VFR BLD AUTO: 35.9 % (ref 35–43)
HGB BLD-MCNC: 12.1 G/DL (ref 11.5–14.5)
IRON SATN MFR SERPL: 24 % (ref 20–50)
IRON SERPL-MCNC: 104 UG/DL (ref 50–212)
LYMPHOCYTES # BLD AUTO: 1.7 THOU/CMM (ref 1–3)
LYMPHOCYTES NFR BLD AUTO: 25 %
MCH RBC QN AUTO: 31.3 PG (ref 26–34)
MCHC RBC AUTO-ENTMCNC: 33.6 G/DL (ref 32–37)
MCV RBC AUTO: 93 FL (ref 80–100)
MONOCYTES # BLD AUTO: 0.5 THOU/CMM (ref 0.3–1)
MONOCYTES NFR BLD AUTO: 8 %
NEUTROPHILS # BLD AUTO: 4.1 THOU/CMM (ref 1.8–7.8)
NEUTROPHILS NFR BLD AUTO: 59 %
PLATELET # BLD AUTO: 273 THOU/CMM (ref 140–350)
PMV BLD REES-ECKER: 7.5 FL (ref 7.5–11.3)
POTASSIUM SERPL-SCNC: 4.2 MMOL/L (ref 3.5–5.2)
PROT SERPL-MCNC: 6.4 G/DL (ref 6.3–8.3)
RBC # BLD AUTO: 3.85 MILL/CMM (ref 3.7–4.7)
SODIUM SERPL-SCNC: 141 MMOL/L (ref 135–145)
TIBC SERPL-MCNC: 440 UG/DL (ref 260–430)
TRANSFERRIN SERPL-MCNC: 314 MG/DL (ref 203–362)
WBC # BLD AUTO: 6.8 THOU/CMM (ref 4–10)

## 2024-03-08 NOTE — TELEPHONE ENCOUNTER
Lab Inquiry   Who are you speaking with? hnl     If it is not the patient, are they listed on an active communication consent form? N/A   Name of ordering provider RUBY Moran   What is being requested? Lab orders need to be entered   Lab draw [facility] ([facility])   What is the best call back number? [phone]    If patient at the lab, Was a live attempts to contact the team made? yes

## 2024-03-11 NOTE — PROGRESS NOTES
HEMATOLOGY / ONCOLOGY CLINIC FOLLOW UP NOTE    Primary Care Provider: No primary care provider on file.  Referring Provider:    MRN: [id number]  : 1970    Reason for Encounter: Follow iron deficiency        Interval History: Patient returns for follow up for h/o iron and B 12 deficiency in setting of post gastrectomy malabsorption. She was last seen on 23.     Blood work completed on 3/8/24 is normal.  However her ferritin is in a low normal range at 17.  She is endorsing symptoms of fatigue, intermittent chest palpitations, restless legs.  The symptoms may all be attributed to her decreased serum ferritin.      REVIEW OF SYSTEMS:  Please note that a 14-point review of systems was performed to include Constitutional, HEENT, Respiratory, CVS, GI, , Musculoskeletal, Integumentary, Neurologic, Rheumatologic, Endocrinologic, Psychiatric, Lymphatic, and Hematologic/Oncologic systems were reviewed and are negative unless otherwise stated in HPI. Positive and negative findings pertinent to this evaluation are incorporated into the history of present illness.      ECOG PS: 0    PROBLEM LIST:  Patient Active Problem List   Diagnosis    Onychomycosis    Obesity (BMI 35.0-39.9 without comorbidity)    Iron deficiency anemia, unspecified    Chronic pain of both knees    Hordeolum externum of left upper eyelid    Primary osteoarthritis of both knees    Seasonal allergies    B12 deficiency    Pain and swelling of knee, right    Rib pain on left side    Primary hypertension       Assessment / Plan:  1. Iron deficiency anemia, unspecified iron deficiency anemia type    2. B12 deficiency      Patient is a 53-year-old female who follows with hematology for history of iron and B12 deficiency in the setting of postgastrectomy malabsorption.  She has been treated with iron and B12 replacement in the past.  Most recent blood work is normal however her serum ferritin is low at 17.  She is describing symptoms that could be  attributed to her decreased serum ferritin level so I have recommended treatment with a course of therapeutic IV Venofer 200 mg x 6 doses.  Patient is in agreement with this recommendation.  We will get her scheduled for this and I will see her back in 6 months with repeat labs.  She knows to call with any questions or concerns in the interim    I spent 25 minutes on chart review, face to face counseling time, coordination of care and documentation.    Past Medical History:   has a past medical history of Anemia (), Anxiety, Arthritis, Asthma, Bipolar disorder (), Chronic pain disorder, Hypertension, Kidney stone, Migraine, Palpitations, Pneumonia (), and Psychiatric disorder.    PAST SURGICAL HISTORY:   has a past surgical history that includes  section; Hysterectomy; Femur fracture surgery; Cholecystectomy; Gastric bypass; Hernia repair; Fracture surgery; Fracture surgery; Shoulder surgery (Right); Breast biopsy (Right, 2020); and US guided breast biopsy right complete (Right, 3/13/2020).    CURRENT MEDICATIONS  Current Outpatient Medications   Medication Sig Dispense Refill    albuterol (PROVENTIL HFA,VENTOLIN HFA) 90 mcg/act inhaler Inhale 2 puffs every 6 (six) hours as needed for wheezing or shortness of breath 1 Inhaler 0    aspirin (ECOTRIN LOW STRENGTH) 81 mg EC tablet Take 81 mg by mouth daily      Diclofenac Sodium (VOLTAREN) 1 % Apply 2 g topically 4 (four) times a day 150 g 1    ibuprofen (MOTRIN) 800 mg tablet Take 1 tablet (800 mg total) by mouth every 6 (six) hours as needed for mild pain or moderate pain 30 tablet 1    loratadine (CLARITIN) 10 mg tablet TAKE 1 TABLET BY MOUTH EVERY DAY 90 tablet 0    LORazepam (ATIVAN) 0.5 mg tablet Take 1 mg by mouth daily as needed  2    QUEtiapine (SEROquel) 100 mg tablet Take 100 mg by mouth daily at bedtime      venlafaxine (EFFEXOR-XR) 150 mg 24 hr capsule Take 150 mg by mouth daily      vitamin B-12 (VITAMIN B-12) 1,000 mcg tablet Take  by mouth daily      amLODIPine-atorvastatin (CADUET) 10-10 MG per tablet Take 1 tablet by mouth daily (Patient not taking: Reported on 6/12/2023)      amoxicillin (AMOXIL) 500 mg capsule TAKE 1 CAPSULE BY MOUTH 3 TIMES A DAY UNTIL GONE (Patient not taking: Reported on 8/4/2021)      benzonatate (TESSALON PERLES) 100 mg capsule Take 1 capsule (100 mg total) by mouth 3 (three) times a day as needed for cough (Patient not taking: No sig reported) 30 capsule 0    bisacodyl (DULCOLAX) 5 mg EC tablet Take 2 tablets (10 mg total) by mouth once for 1 dose 2 tablet 0    chlorhexidine (PERIDEX) 0.12 % solution SWISH AND SPIT 15MLS TWICE DAY (Patient not taking: No sig reported)      ferrous sulfate 325 (65 Fe) mg tablet Take 1 tablet (325 mg total) by mouth daily with breakfast 30 tablet 0    fluticasone (FLONASE) 50 mcg/act nasal spray 1 spray into each nostril daily (Patient not taking: No sig reported) 1 Bottle 2    guaiFENesin (ROBITUSSIN) 100 MG/5ML oral liquid Take 10 mL (200 mg total) by mouth 3 (three) times a day as needed for cough (Patient not taking: Reported on 3/11/2021) 120 mL 0    HYDROcodone-acetaminophen (NORCO) 5-325 mg per tablet TAKE 1 TABLET BY MOUTH EVERY 4 HOURS AS NEEDED FOR DISCOMFORT (Patient not taking: Reported on 6/12/2023)      ketotifen (ZADITOR) 0.025 % ophthalmic solution Administer 1 drop to both eyes 2 (two) times a day (Patient not taking: Reported on 3/11/2021) 5 mL 0    lisinopril (ZESTRIL) 20 mg tablet TAKE 1 TABLET BY MOUTH EVERY DAY (Patient taking differently: 40 mg) 90 tablet 0    methocarbamol (Robaxin-750) 750 mg tablet Take 1 tablet (750 mg total) by mouth every 6 (six) hours as needed for muscle spasms (Patient taking differently: Take 500 mg by mouth every 6 (six) hours as needed for muscle spasms) 30 tablet 1    NON FORMULARY Amlodipine-besylate 10mg unknown frequency per patient      polyethylene glycol (GOLYTELY) 4000 mL solution Take 4,000 mL by mouth once for 1 dose  "(Patient not taking: Reported on 2/22/2023) 4000 mL 0    tobramycin in sterile water-balanced salts ophthalmic solution Apply 1-2 drops to eye every 4 (four) hours (Patient not taking: No sig reported) 7 mL 0    triamcinolone (KENALOG) 0.1 % cream Apply topically 2 (two) times a day (Patient not taking: Reported on 8/22/2022) 30 g 0    venlafaxine (EFFEXOR) 50 mg tablet Take 225 mg by mouth daily  (Patient not taking: Reported on 6/22/2022)      venlafaxine (EFFEXOR-XR) 75 mg 24 hr capsule Take 75 mg by mouth daily       No current facility-administered medications for this visit.     [unfilled]    SOCIAL HISTORY:   reports that she quit smoking about 10 months ago. Her smoking use included cigarettes. She has never used smokeless tobacco. She reports that she does not currently use alcohol. She reports that she does not use drugs.     FAMILY HISTORY:  family history includes Breast cancer in her maternal aunt; Diabetes in her father and maternal grandmother; Hernia in her mother; Hypertension in her father and maternal grandmother; Ovarian cancer in her maternal aunt; Thyroid disease in her mother.     ALLERGIES:  is allergic to codeine, latex, morphine, morphine and related, and sulfa antibiotics.      Physical Exam:  Vital Signs:   Visit Vitals  /73 (BP Location: Left arm, Patient Position: Sitting, Cuff Size: Large)   Pulse 93   Temp 97.6 °F (36.4 °C) (Temporal)   Resp 18   Ht 5' 3\" (1.6 m)   Wt 125 kg (276 lb)   SpO2 97%   BMI 48.89 kg/m²   OB Status Hysterectomy   Smoking Status Former   BSA 2.22 m²     Body mass index is 48.89 kg/m².  Body surface area is 2.22 meters squared.    Physical Exam  Constitutional:       General: She is not in acute distress.     Appearance: Normal appearance. She is obese.   HENT:      Head: Normocephalic and atraumatic.   Eyes:      General: No scleral icterus.        Right eye: No discharge.         Left eye: No discharge.      Conjunctiva/sclera: Conjunctivae normal. "   Cardiovascular:      Rate and Rhythm: Normal rate and regular rhythm.   Pulmonary:      Effort: Pulmonary effort is normal. No respiratory distress.      Breath sounds: Normal breath sounds.   Abdominal:      General: Bowel sounds are normal. There is no distension.      Palpations: Abdomen is soft. There is no mass.      Tenderness: There is no abdominal tenderness.   Musculoskeletal:         General: Normal range of motion.   Lymphadenopathy:      Cervical: No cervical adenopathy.      Upper Body:      Right upper body: No supraclavicular, axillary or pectoral adenopathy.      Left upper body: No supraclavicular, axillary or pectoral adenopathy.   Skin:     General: Skin is warm and dry.   Neurological:      General: No focal deficit present.      Mental Status: She is alert and oriented to person, place, and time.   Psychiatric:         Mood and Affect: Mood normal.         Behavior: Behavior normal.         Labs:  Lab Results   Component Value Date    WBC 6.8 03/08/2024    HGB 12.1 03/08/2024    HCT 35.9 03/08/2024    MCV 93 03/08/2024     03/08/2024     Lab Results   Component Value Date     08/25/2014    SODIUM 141 03/08/2024    SODIUM 141 03/08/2024    K 4.2 03/08/2024    K 4.2 03/08/2024     03/08/2024     03/08/2024    CO2 27 03/08/2024    CO2 27 03/08/2024    ANIONGAP 6 08/25/2014    AGAP 10 03/08/2024    AGAP 10 03/08/2024    BUN 21 03/08/2024    BUN 21 03/08/2024    CREATININE 0.73 03/08/2024    CREATININE 0.73 03/08/2024    GLUC 91 03/08/2024    GLUC 91 03/08/2024    GLUF 84 02/20/2023    CALCIUM 9.3 03/08/2024    CALCIUM 9.3 03/08/2024    AST 16 03/08/2024    AST 16 03/08/2024    ALT 11 03/08/2024    ALT 11 03/08/2024    ALKPHOS 95 03/08/2024    ALKPHOS 95 03/08/2024    PROT 7.4 08/25/2014    TP 6.4 03/08/2024    TP 6.4 03/08/2024    BILITOT 0.42 08/25/2014    TBILI 0.2 03/08/2024    TBILI 0.2 03/08/2024    EGFR 97 03/08/2024    EGFR 97 03/08/2024

## 2024-03-12 ENCOUNTER — TELEPHONE (OUTPATIENT)
Dept: HEMATOLOGY ONCOLOGY | Facility: CLINIC | Age: 54
End: 2024-03-12

## 2024-03-12 LAB — METHYLMALONATE SERPL-SCNC: 0.13 UMOL/L

## 2024-03-12 NOTE — TELEPHONE ENCOUNTER
Appointment Confirmation   Who are you speaking with? Patient   If it is not the patient, are they listed on an active communication consent form? N/A   Which provider is the appointment scheduled with?  RUBY Moran   When is the appointment scheduled?  Please list date and time 3/13/24 9:30am   At which location is the appointment scheduled to take place? [place]   Did caller verbalize understanding of appointment details? Yes

## 2024-03-13 ENCOUNTER — OFFICE VISIT (OUTPATIENT)
Age: 54
End: 2024-03-13
Payer: COMMERCIAL

## 2024-03-13 ENCOUNTER — TELEPHONE (OUTPATIENT)
Age: 54
End: 2024-03-13

## 2024-03-13 VITALS
HEART RATE: 93 BPM | RESPIRATION RATE: 18 BRPM | HEIGHT: 63 IN | SYSTOLIC BLOOD PRESSURE: 106 MMHG | TEMPERATURE: 97.6 F | WEIGHT: 276 LBS | OXYGEN SATURATION: 97 % | DIASTOLIC BLOOD PRESSURE: 73 MMHG | BODY MASS INDEX: 48.9 KG/M2

## 2024-03-13 DIAGNOSIS — E53.8 B12 DEFICIENCY: ICD-10-CM

## 2024-03-13 DIAGNOSIS — D50.9 IRON DEFICIENCY ANEMIA, UNSPECIFIED IRON DEFICIENCY ANEMIA TYPE: Primary | ICD-10-CM

## 2024-03-13 PROCEDURE — 99213 OFFICE O/P EST LOW 20 MIN: CPT | Performed by: NURSE PRACTITIONER

## 2024-03-13 RX ORDER — SODIUM CHLORIDE 9 MG/ML
20 INJECTION, SOLUTION INTRAVENOUS ONCE
OUTPATIENT
Start: 2024-04-04

## 2024-03-13 RX ORDER — ASPIRIN 81 MG/1
81 TABLET ORAL DAILY
COMMUNITY

## 2024-03-13 NOTE — TELEPHONE ENCOUNTER
What would be a preferred day of the week that would work best for your infusion appointment? Thursdays  Do you prefer mornings or afternoons for your appointments? AM  Are there any days or dates that do not work for your schedule, including any upcoming vacations? N/A  We are going to try our best to schedule you at the infusion center closest to your home.  In the event that we are unable to what would be your next preferred infusion site or sites? [facility]  Do you have transportation to take you to all of your appointments? yes

## 2024-04-04 ENCOUNTER — HOSPITAL ENCOUNTER (OUTPATIENT)
Dept: INFUSION CENTER | Facility: HOSPITAL | Age: 54
Discharge: HOME | End: 2024-04-04
Attending: INTERNAL MEDICINE
Payer: COMMERCIAL

## 2024-04-04 VITALS
TEMPERATURE: 96.6 F | HEART RATE: 84 BPM | RESPIRATION RATE: 18 BRPM | DIASTOLIC BLOOD PRESSURE: 65 MMHG | OXYGEN SATURATION: 95 % | SYSTOLIC BLOOD PRESSURE: 128 MMHG

## 2024-04-04 DIAGNOSIS — D50.9 IRON DEFICIENCY ANEMIA, UNSPECIFIED IRON DEFICIENCY ANEMIA TYPE: Primary | ICD-10-CM

## 2024-04-04 RX ORDER — METOPROLOL SUCCINATE 25 MG/1
50 TABLET, EXTENDED RELEASE ORAL DAILY
COMMUNITY
Start: 2024-03-21 | End: 2025-03-21

## 2024-04-04 RX ORDER — SODIUM CHLORIDE 9 MG/ML
20 INJECTION, SOLUTION INTRAVENOUS ONCE
Status: CANCELLED | OUTPATIENT
Start: 2024-04-11

## 2024-04-04 RX ORDER — SODIUM CHLORIDE 9 MG/ML
20 INJECTION, SOLUTION INTRAVENOUS ONCE
Status: COMPLETED | OUTPATIENT
Start: 2024-04-04 | End: 2024-04-04

## 2024-04-04 RX ORDER — NITROGLYCERIN 0.4 MG/1
1 TABLET SUBLINGUAL
COMMUNITY
Start: 2024-03-27 | End: 2025-03-27

## 2024-04-04 RX ADMIN — SODIUM CHLORIDE 20 ML/HR: 9 INJECTION, SOLUTION INTRAVENOUS at 08:18

## 2024-04-04 RX ADMIN — IRON SUCROSE 200 MG: 20 INJECTION, SOLUTION INTRAVENOUS at 08:18

## 2024-04-04 NOTE — PROGRESS NOTES
Pt tolerated treatment with no adv reactions; pt aware of next appt 4/11 at 0900; left unit  at ambulatory with steady gait.

## 2024-04-11 ENCOUNTER — HOSPITAL ENCOUNTER (OUTPATIENT)
Dept: INFUSION CENTER | Facility: HOSPITAL | Age: 54
Discharge: HOME | End: 2024-04-11
Attending: INTERNAL MEDICINE
Payer: COMMERCIAL

## 2024-04-11 VITALS
TEMPERATURE: 96.2 F | HEART RATE: 83 BPM | RESPIRATION RATE: 18 BRPM | DIASTOLIC BLOOD PRESSURE: 71 MMHG | OXYGEN SATURATION: 97 % | SYSTOLIC BLOOD PRESSURE: 106 MMHG

## 2024-04-11 DIAGNOSIS — D50.9 IRON DEFICIENCY ANEMIA, UNSPECIFIED IRON DEFICIENCY ANEMIA TYPE: Primary | ICD-10-CM

## 2024-04-11 PROCEDURE — 96365 THER/PROPH/DIAG IV INF INIT: CPT

## 2024-04-11 RX ORDER — SODIUM CHLORIDE 9 MG/ML
20 INJECTION, SOLUTION INTRAVENOUS ONCE
Status: COMPLETED | OUTPATIENT
Start: 2024-04-11 | End: 2024-04-11

## 2024-04-11 RX ORDER — SODIUM CHLORIDE 9 MG/ML
20 INJECTION, SOLUTION INTRAVENOUS ONCE
Status: CANCELLED | OUTPATIENT
Start: 2024-04-18

## 2024-04-11 RX ADMIN — SODIUM CHLORIDE 20 ML/HR: 9 INJECTION, SOLUTION INTRAVENOUS at 08:59

## 2024-04-11 RX ADMIN — IRON SUCROSE 200 MG: 20 INJECTION, SOLUTION INTRAVENOUS at 08:59

## 2024-04-11 NOTE — PROGRESS NOTES
Patricia Swain  tolerated treatment well with no complications.      Patricia Swain is aware of future appt on 4/18/24 at 0830.     AVS printed and given to Patricia Swain:  No (Declined by Patricia Swain)

## 2024-04-18 ENCOUNTER — HOSPITAL ENCOUNTER (OUTPATIENT)
Dept: INFUSION CENTER | Facility: HOSPITAL | Age: 54
Discharge: HOME | End: 2024-04-18
Attending: INTERNAL MEDICINE
Payer: COMMERCIAL

## 2024-04-18 VITALS
RESPIRATION RATE: 18 BRPM | TEMPERATURE: 98.7 F | OXYGEN SATURATION: 96 % | DIASTOLIC BLOOD PRESSURE: 58 MMHG | HEART RATE: 94 BPM | SYSTOLIC BLOOD PRESSURE: 110 MMHG

## 2024-04-18 DIAGNOSIS — D50.9 IRON DEFICIENCY ANEMIA, UNSPECIFIED IRON DEFICIENCY ANEMIA TYPE: Primary | ICD-10-CM

## 2024-04-18 PROCEDURE — 96365 THER/PROPH/DIAG IV INF INIT: CPT

## 2024-04-18 RX ORDER — SODIUM CHLORIDE 9 MG/ML
20 INJECTION, SOLUTION INTRAVENOUS ONCE
Status: COMPLETED | OUTPATIENT
Start: 2024-04-18 | End: 2024-04-18

## 2024-04-18 RX ORDER — SODIUM CHLORIDE 9 MG/ML
20 INJECTION, SOLUTION INTRAVENOUS ONCE
Status: CANCELLED | OUTPATIENT
Start: 2024-04-25

## 2024-04-18 RX ADMIN — IRON SUCROSE 200 MG: 20 INJECTION, SOLUTION INTRAVENOUS at 08:38

## 2024-04-18 RX ADMIN — SODIUM CHLORIDE 20 ML/HR: 9 INJECTION, SOLUTION INTRAVENOUS at 08:38

## 2024-04-18 NOTE — PROGRESS NOTES
Patricia Swain  tolerated treatment well with no complications.      Patricia Swain is aware of future appt on 04/25/2024 at 08:00 AM.     AVS printed and given to Patricia Swain:  No (Declined by Patricia Swain)

## 2024-04-25 ENCOUNTER — HOSPITAL ENCOUNTER (OUTPATIENT)
Dept: INFUSION CENTER | Facility: HOSPITAL | Age: 54
Discharge: HOME | End: 2024-04-25
Attending: INTERNAL MEDICINE
Payer: COMMERCIAL

## 2024-04-25 VITALS
RESPIRATION RATE: 16 BRPM | HEART RATE: 89 BPM | DIASTOLIC BLOOD PRESSURE: 71 MMHG | TEMPERATURE: 96.5 F | SYSTOLIC BLOOD PRESSURE: 110 MMHG | OXYGEN SATURATION: 96 %

## 2024-04-25 DIAGNOSIS — D50.9 IRON DEFICIENCY ANEMIA, UNSPECIFIED IRON DEFICIENCY ANEMIA TYPE: Primary | ICD-10-CM

## 2024-04-25 PROCEDURE — 96365 THER/PROPH/DIAG IV INF INIT: CPT

## 2024-04-25 RX ORDER — SODIUM CHLORIDE 9 MG/ML
20 INJECTION, SOLUTION INTRAVENOUS ONCE
Status: CANCELLED | OUTPATIENT
Start: 2024-05-02

## 2024-04-25 RX ORDER — SODIUM CHLORIDE 9 MG/ML
20 INJECTION, SOLUTION INTRAVENOUS ONCE
Status: COMPLETED | OUTPATIENT
Start: 2024-04-25 | End: 2024-04-25

## 2024-04-25 RX ADMIN — IRON SUCROSE 200 MG: 20 INJECTION, SOLUTION INTRAVENOUS at 08:20

## 2024-04-25 RX ADMIN — SODIUM CHLORIDE 20 ML/HR: 9 INJECTION, SOLUTION INTRAVENOUS at 08:19

## 2024-04-25 NOTE — PROGRESS NOTES
..Patricia Swain  tolerated treatment well with no complications.      Patricia Swain is aware of future appt on 5/2 at 9:00.     AVS printed and given to Patricia Swain:  No (Declined by Patricia Swain)

## 2024-05-02 ENCOUNTER — HOSPITAL ENCOUNTER (OUTPATIENT)
Dept: INFUSION CENTER | Facility: HOSPITAL | Age: 54
Discharge: HOME | End: 2024-05-02
Attending: INTERNAL MEDICINE
Payer: COMMERCIAL

## 2024-05-02 VITALS
OXYGEN SATURATION: 96 % | RESPIRATION RATE: 16 BRPM | SYSTOLIC BLOOD PRESSURE: 117 MMHG | HEART RATE: 80 BPM | TEMPERATURE: 98.1 F | DIASTOLIC BLOOD PRESSURE: 62 MMHG

## 2024-05-02 DIAGNOSIS — D50.9 IRON DEFICIENCY ANEMIA, UNSPECIFIED IRON DEFICIENCY ANEMIA TYPE: Primary | ICD-10-CM

## 2024-05-02 PROCEDURE — 96365 THER/PROPH/DIAG IV INF INIT: CPT

## 2024-05-02 RX ORDER — SODIUM CHLORIDE 9 MG/ML
20 INJECTION, SOLUTION INTRAVENOUS ONCE
Status: CANCELLED | OUTPATIENT
Start: 2024-05-09

## 2024-05-02 RX ORDER — SODIUM CHLORIDE 9 MG/ML
20 INJECTION, SOLUTION INTRAVENOUS ONCE
Status: COMPLETED | OUTPATIENT
Start: 2024-05-02 | End: 2024-05-02

## 2024-05-02 RX ADMIN — SODIUM CHLORIDE 20 ML/HR: 9 INJECTION, SOLUTION INTRAVENOUS at 09:13

## 2024-05-02 RX ADMIN — IRON SUCROSE 200 MG: 20 INJECTION, SOLUTION INTRAVENOUS at 09:13

## 2024-05-02 NOTE — PROGRESS NOTES
Patient Venofer infusion completed without complication. Patient declined AVS at this time and confirmed next appointment on 5/9/24 at 0800. Patient discharged in stable condition to home via ambulation.

## 2024-05-09 ENCOUNTER — OFFICE VISIT (OUTPATIENT)
Dept: AUDIOLOGY | Age: 54
End: 2024-05-09
Payer: COMMERCIAL

## 2024-05-09 ENCOUNTER — HOSPITAL ENCOUNTER (OUTPATIENT)
Dept: INFUSION CENTER | Facility: HOSPITAL | Age: 54
Discharge: HOME | End: 2024-05-09
Attending: INTERNAL MEDICINE
Payer: COMMERCIAL

## 2024-05-09 VITALS
DIASTOLIC BLOOD PRESSURE: 57 MMHG | OXYGEN SATURATION: 97 % | RESPIRATION RATE: 16 BRPM | TEMPERATURE: 97.3 F | HEART RATE: 86 BPM | SYSTOLIC BLOOD PRESSURE: 112 MMHG

## 2024-05-09 DIAGNOSIS — D50.9 IRON DEFICIENCY ANEMIA, UNSPECIFIED IRON DEFICIENCY ANEMIA TYPE: Primary | ICD-10-CM

## 2024-05-09 DIAGNOSIS — H90.3 SENSORY HEARING LOSS, BILATERAL: Primary | ICD-10-CM

## 2024-05-09 PROCEDURE — 96365 THER/PROPH/DIAG IV INF INIT: CPT

## 2024-05-09 PROCEDURE — 92567 TYMPANOMETRY: CPT

## 2024-05-09 RX ORDER — SODIUM CHLORIDE 9 MG/ML
20 INJECTION, SOLUTION INTRAVENOUS ONCE
Status: COMPLETED | OUTPATIENT
Start: 2024-05-09 | End: 2024-05-09

## 2024-05-09 RX ORDER — SODIUM CHLORIDE 9 MG/ML
20 INJECTION, SOLUTION INTRAVENOUS ONCE
Status: CANCELLED | OUTPATIENT
Start: 2024-05-09

## 2024-05-09 RX ADMIN — IRON SUCROSE 200 MG: 20 INJECTION, SOLUTION INTRAVENOUS at 07:58

## 2024-05-09 RX ADMIN — SODIUM CHLORIDE 20 ML/HR: 9 INJECTION, SOLUTION INTRAVENOUS at 07:58

## 2024-05-09 NOTE — PROGRESS NOTES
Diagnostic Hearing Evaluation    Name:  Patricia Swain  :  1970  Age:  53 y.o.   MRN:  [id number]  Date of Evaluation: 24     HISTORY:     Reason for visit: Difficulty Understanding    Patricia Swain is being seen today at the request of Dr. Joiner for an initial  evaluation of hearing. Patient reports difficulty hearing especially in background noise.    EVALUATION:    Otoscopic Evaluation:   Right Ear: Unremarkable, canal clear   Left Ear: Unremarkable, canal clear    Tympanometry:   Right Ear: Type B; no measurable middle ear pressure or static compliance, consistent with middle ear pathology.    Left Ear: Type As, normal middle ear pressure with decreased static compliance, consistent with a hypomobile tympanic membrane.     Pure Tone Audiometry:  Waiting for healthy ears.    *see attached audiogram    RECOMMENDATIONS:  1 month hearing eval, Consult ENT, and Return to [facility]. for F/U    PATIENT EDUCATION:   The results of today's results and recommendations were reviewed with the patient and her hearing thresholds were explained at length. Treatment options, including amplification and communication strategies, were discussed as appropriate. The patient voiced understanding of her test results. Questions were addressed and the patient was encouraged to contact our department should concerns arise.      Jorge Ochoa., [facility]  Clinical Audiologist  [facility]  [facility] [address]  [place] [address]

## 2024-05-09 NOTE — PROGRESS NOTES
Patricia Swain  tolerated treatment well with no complications.      Patricia Swain has no future appts. Scheduled with infusion ctr. Currently. Pt. Has f/u appt. With hem/onc office scheduled for 09/12/2024 at 10:30 AM.     AVS printed and given to Patricia Swain:  No (Declined by Patricia Swain)

## 2024-08-29 ENCOUNTER — OFFICE VISIT (OUTPATIENT)
Dept: AUDIOLOGY | Age: 54
End: 2024-08-29

## 2024-08-29 DIAGNOSIS — H90.3 SENSORY HEARING LOSS, BILATERAL: Primary | ICD-10-CM

## 2024-08-29 NOTE — PROGRESS NOTES
Progress Note    Name:  Patricia Swain  :  1970  Age:  54 y.o.  MRN:  [id number]  Date of Evaluation: 24     Patient arrived for a HAE. Patient was advised of his [employer] hearing benefit.He noted being unable to pay out of pocket at this time. Types of hearing aids were discussed. Patient will contact the office with any questions.       Jorge Ochoa., [facility]  Clinical Audiologist

## 2024-09-12 ENCOUNTER — OFFICE VISIT (OUTPATIENT)
Age: 54
End: 2024-09-12
Payer: COMMERCIAL

## 2024-09-12 VITALS
TEMPERATURE: 97.6 F | BODY MASS INDEX: 49.95 KG/M2 | RESPIRATION RATE: 16 BRPM | DIASTOLIC BLOOD PRESSURE: 77 MMHG | WEIGHT: 282 LBS | SYSTOLIC BLOOD PRESSURE: 121 MMHG | HEART RATE: 120 BPM | OXYGEN SATURATION: 98 %

## 2024-09-12 DIAGNOSIS — D50.9 IRON DEFICIENCY ANEMIA, UNSPECIFIED IRON DEFICIENCY ANEMIA TYPE: Primary | ICD-10-CM

## 2024-09-12 DIAGNOSIS — E53.8 B12 DEFICIENCY: ICD-10-CM

## 2024-09-12 PROCEDURE — 99213 OFFICE O/P EST LOW 20 MIN: CPT | Performed by: NURSE PRACTITIONER

## 2024-09-12 NOTE — PROGRESS NOTES
HEMATOLOGY / ONCOLOGY CLINIC FOLLOW UP NOTE    Primary Care Provider: Gregorio Joiner MD  Referring Provider:    MRN: [id number]  : 1970    Reason for Encounter: Follow iron deficiency        Interval History: Patient returns for follow up for h/o iron and B 12 deficiency in setting of post gastrectomy malabsorption.  She completed a course of parenteral iron replacement in May.  Blood work done prior to today's visit demonstrates normalization of her numbers.  She is no longer anemic or iron deficient.  She feels well.      REVIEW OF SYSTEMS:  Please note that a 14-point review of systems was performed to include Constitutional, HEENT, Respiratory, CVS, GI, , Musculoskeletal, Integumentary, Neurologic, Rheumatologic, Endocrinologic, Psychiatric, Lymphatic, and Hematologic/Oncologic systems were reviewed and are negative unless otherwise stated in HPI. Positive and negative findings pertinent to this evaluation are incorporated into the history of present illness.      ECOG PS: 0    PROBLEM LIST:  Patient Active Problem List   Diagnosis    Onychomycosis    Obesity (BMI 35.0-39.9 without comorbidity)    Iron deficiency anemia, unspecified    Chronic pain of both knees    Hordeolum externum of left upper eyelid    Primary osteoarthritis of both knees    Seasonal allergies    B12 deficiency    Pain and swelling of knee, right    Rib pain on left side    Primary hypertension       Assessment / Plan:  1. Iron deficiency anemia, unspecified iron deficiency anemia type    2. B12 deficiency        Patient is a 54-year-old female who follows with hematology for history of iron and B12 deficiency in the setting of postgastrectomy malabsorption.  She has been treated with iron and B12 replacement in the past and more recently this past April/May.  Most recent blood work has normalized.  She will continue on observation.  Due to her history of recurrent iron deficiency I will see her back in 6 months with repeat blood  work.  Patient is in agreement with this plan of care and knows to call anytime with questions or concerns in the interim    I spent 25 minutes on chart review, face to face counseling time, coordination of care and documentation.    Past Medical History:   has a past medical history of Anemia (), Anxiety, Arthritis, Asthma, Bipolar disorder (), Chronic pain disorder, Hypertension, Kidney stone, Migraine, Palpitations, Pneumonia (), and Psychiatric disorder.    PAST SURGICAL HISTORY:   has a past surgical history that includes  section; Hysterectomy; Femur fracture surgery; Cholecystectomy; Gastric bypass; Hernia repair; Fracture surgery; Fracture surgery; Shoulder surgery (Right); Breast biopsy (Right, 2020); and US guided breast biopsy right complete (Right, 3/13/2020).    CURRENT MEDICATIONS  Current Outpatient Medications   Medication Sig Dispense Refill    albuterol (PROVENTIL HFA,VENTOLIN HFA) 90 mcg/act inhaler Inhale 2 puffs every 6 (six) hours as needed for wheezing or shortness of breath 1 Inhaler 0    aspirin (ECOTRIN LOW STRENGTH) 81 mg EC tablet Take 81 mg by mouth daily      Diclofenac Sodium (VOLTAREN) 1 % Apply 2 g topically 4 (four) times a day 150 g 1    ibuprofen (MOTRIN) 800 mg tablet Take 1 tablet (800 mg total) by mouth every 6 (six) hours as needed for mild pain or moderate pain 30 tablet 1    lisinopril (ZESTRIL) 20 mg tablet TAKE 1 TABLET BY MOUTH EVERY DAY (Patient taking differently: 40 mg) 90 tablet 0    loratadine (CLARITIN) 10 mg tablet TAKE 1 TABLET BY MOUTH EVERY DAY 90 tablet 0    LORazepam (ATIVAN) 0.5 mg tablet Take 1 mg by mouth daily as needed  2    metoprolol succinate (TOPROL-XL) 25 mg 24 hr tablet Take 50 mg by mouth daily      venlafaxine (EFFEXOR-XR) 150 mg 24 hr capsule Take 150 mg by mouth daily      vitamin B-12 (VITAMIN B-12) 1,000 mcg tablet Take by mouth daily      amLODIPine-atorvastatin (CADUET) 10-10 MG per tablet Take 1 tablet by mouth  daily (Patient not taking: Reported on 6/12/2023)      amoxicillin (AMOXIL) 500 mg capsule TAKE 1 CAPSULE BY MOUTH 3 TIMES A DAY UNTIL GONE (Patient not taking: Reported on 8/4/2021)      benzonatate (TESSALON PERLES) 100 mg capsule Take 1 capsule (100 mg total) by mouth 3 (three) times a day as needed for cough (Patient not taking: No sig reported) 30 capsule 0    bisacodyl (DULCOLAX) 5 mg EC tablet Take 2 tablets (10 mg total) by mouth once for 1 dose 2 tablet 0    chlorhexidine (PERIDEX) 0.12 % solution SWISH AND SPIT 15MLS TWICE DAY (Patient not taking: No sig reported)      ferrous sulfate 325 (65 Fe) mg tablet Take 1 tablet (325 mg total) by mouth daily with breakfast 30 tablet 0    fluticasone (FLONASE) 50 mcg/act nasal spray 1 spray into each nostril daily (Patient not taking: No sig reported) 1 Bottle 2    guaiFENesin (ROBITUSSIN) 100 MG/5ML oral liquid Take 10 mL (200 mg total) by mouth 3 (three) times a day as needed for cough (Patient not taking: Reported on 3/11/2021) 120 mL 0    HYDROcodone-acetaminophen (NORCO) 5-325 mg per tablet TAKE 1 TABLET BY MOUTH EVERY 4 HOURS AS NEEDED FOR DISCOMFORT (Patient not taking: Reported on 6/12/2023)      ketotifen (ZADITOR) 0.025 % ophthalmic solution Administer 1 drop to both eyes 2 (two) times a day (Patient not taking: Reported on 3/11/2021) 5 mL 0    methocarbamol (Robaxin-750) 750 mg tablet Take 1 tablet (750 mg total) by mouth every 6 (six) hours as needed for muscle spasms (Patient taking differently: Take 500 mg by mouth every 6 (six) hours as needed for muscle spasms) 30 tablet 1    nitroglycerin (NITROSTAT) 0.4 mg SL tablet Place 1 tablet under the tongue every 5 (five) minutes as needed for chest pain (Patient not taking: Reported on 4/18/2024)      NON FORMULARY Amlodipine-besylate 10mg unknown frequency per patient      polyethylene glycol (GOLYTELY) 4000 mL solution Take 4,000 mL by mouth once for 1 dose (Patient not taking: Reported on 2/22/2023) 4000  mL 0    QUEtiapine (SEROquel) 100 mg tablet Take 100 mg by mouth daily at bedtime      tobramycin in sterile water-balanced salts ophthalmic solution Apply 1-2 drops to eye every 4 (four) hours (Patient not taking: No sig reported) 7 mL 0    triamcinolone (KENALOG) 0.1 % cream Apply topically 2 (two) times a day (Patient not taking: Reported on 8/22/2022) 30 g 0    venlafaxine (EFFEXOR) 50 mg tablet Take 225 mg by mouth daily  (Patient not taking: Reported on 6/22/2022)      venlafaxine (EFFEXOR-XR) 75 mg 24 hr capsule Take 75 mg by mouth daily       No current facility-administered medications for this visit.     [unfilled]    SOCIAL HISTORY:   reports that she quit smoking about 16 months ago. Her smoking use included cigarettes. She has never used smokeless tobacco. She reports that she does not currently use alcohol. She reports that she does not use drugs.     FAMILY HISTORY:  family history includes Breast cancer in her maternal aunt; Diabetes in her father and maternal grandmother; Hernia in her mother; Hypertension in her father and maternal grandmother; Ovarian cancer in her maternal aunt; Thyroid disease in her mother.     ALLERGIES:  is allergic to codeine, latex, morphine, morphine and codeine, and sulfa antibiotics.      Physical Exam:  Vital Signs:   Visit Vitals  /77 (BP Location: Left arm, Patient Position: Sitting, Cuff Size: Standard)   Pulse (!) 120   Temp 97.6 °F (36.4 °C) (Temporal)   Resp 16   Wt 128 kg (282 lb)   SpO2 98%   BMI 49.95 kg/m²   OB Status Hysterectomy   Smoking Status Former   BSA 2.24 m²     Body mass index is 49.95 kg/m².  Body surface area is 2.24 meters squared.    Physical Exam  Constitutional:       General: She is not in acute distress.     Appearance: Normal appearance. She is obese.   HENT:      Head: Normocephalic and atraumatic.   Eyes:      General: No scleral icterus.        Right eye: No discharge.         Left eye: No discharge.      Conjunctiva/sclera:  Conjunctivae normal.   Cardiovascular:      Rate and Rhythm: Normal rate and regular rhythm.   Pulmonary:      Effort: Pulmonary effort is normal. No respiratory distress.      Breath sounds: Normal breath sounds.   Abdominal:      General: Bowel sounds are normal. There is no distension.      Palpations: Abdomen is soft. There is no mass.      Tenderness: There is no abdominal tenderness.   Musculoskeletal:         General: Normal range of motion.   Lymphadenopathy:      Cervical: No cervical adenopathy.      Upper Body:      Right upper body: No supraclavicular, axillary or pectoral adenopathy.      Left upper body: No supraclavicular, axillary or pectoral adenopathy.   Skin:     General: Skin is warm and dry.   Neurological:      General: No focal deficit present.      Mental Status: She is alert and oriented to person, place, and time.   Psychiatric:         Mood and Affect: Mood normal.         Behavior: Behavior normal.         Labs:  Lab Results   Component Value Date    WBC 6.8 03/08/2024    HGB 12.1 03/08/2024    HCT 35.9 03/08/2024    MCV 93 03/08/2024     03/08/2024     Lab Results   Component Value Date     08/25/2014    SODIUM 140 08/29/2024    K 4.2 08/29/2024     08/29/2024    CO2 24 08/29/2024    ANIONGAP 6 08/25/2014    AGAP 9 08/29/2024    BUN 20 08/29/2024    CREATININE 1.10 08/29/2024    GLUC 95 08/29/2024    GLUF 84 02/20/2023    CALCIUM 9.3 08/29/2024    AST 12 08/29/2024    ALT 10 08/29/2024    ALKPHOS 81 08/29/2024    PROT 7.4 08/25/2014    TP 6.3 08/29/2024    BILITOT 0.42 08/25/2014    TBILI 0.3 08/29/2024    EGFR 59 (L) 08/29/2024

## 2025-02-10 ENCOUNTER — OFFICE VISIT (OUTPATIENT)
Dept: FAMILY MEDICINE CLINIC | Facility: CLINIC | Age: 55
End: 2025-02-10
Payer: MEDICARE

## 2025-02-10 VITALS
HEIGHT: 63 IN | TEMPERATURE: 98.8 F | WEIGHT: 284.83 LBS | BODY MASS INDEX: 50.47 KG/M2 | OXYGEN SATURATION: 98 % | HEART RATE: 95 BPM | DIASTOLIC BLOOD PRESSURE: 92 MMHG | SYSTOLIC BLOOD PRESSURE: 141 MMHG

## 2025-02-10 DIAGNOSIS — E78.2 MIXED HYPERLIPIDEMIA: ICD-10-CM

## 2025-02-10 DIAGNOSIS — D50.0 IRON DEFICIENCY ANEMIA DUE TO CHRONIC BLOOD LOSS: ICD-10-CM

## 2025-02-10 DIAGNOSIS — F33.2 MODERATELY SEVERE RECURRENT MAJOR DEPRESSION (HCC): ICD-10-CM

## 2025-02-10 DIAGNOSIS — E53.8 B12 DEFICIENCY: ICD-10-CM

## 2025-02-10 DIAGNOSIS — G43.109 MIGRAINE WITH AURA AND WITHOUT STATUS MIGRAINOSUS, NOT INTRACTABLE: ICD-10-CM

## 2025-02-10 DIAGNOSIS — J43.0 UNILATERAL EMPHYSEMA (HCC): ICD-10-CM

## 2025-02-10 DIAGNOSIS — Z87.891 FORMER SMOKER: ICD-10-CM

## 2025-02-10 DIAGNOSIS — I10 PRIMARY HYPERTENSION: Primary | ICD-10-CM

## 2025-02-10 DIAGNOSIS — I20.89 STABLE ANGINA (HCC): ICD-10-CM

## 2025-02-10 DIAGNOSIS — Z98.84 S/P GASTRIC BYPASS: ICD-10-CM

## 2025-02-10 DIAGNOSIS — E66.01 CLASS 3 SEVERE OBESITY DUE TO EXCESS CALORIES WITHOUT SERIOUS COMORBIDITY WITH BODY MASS INDEX (BMI) OF 50.0 TO 59.9 IN ADULT (HCC): ICD-10-CM

## 2025-02-10 DIAGNOSIS — Z76.89 ENCOUNTER TO ESTABLISH CARE: ICD-10-CM

## 2025-02-10 DIAGNOSIS — Z12.11 COLON CANCER SCREENING: ICD-10-CM

## 2025-02-10 DIAGNOSIS — Z11.4 SCREENING FOR HIV (HUMAN IMMUNODEFICIENCY VIRUS): ICD-10-CM

## 2025-02-10 DIAGNOSIS — M17.0 PRIMARY OSTEOARTHRITIS OF BOTH KNEES: ICD-10-CM

## 2025-02-10 DIAGNOSIS — Z11.59 NEED FOR HEPATITIS C SCREENING TEST: ICD-10-CM

## 2025-02-10 DIAGNOSIS — Z12.31 ENCOUNTER FOR SCREENING MAMMOGRAM FOR BREAST CANCER: ICD-10-CM

## 2025-02-10 DIAGNOSIS — E66.813 CLASS 3 SEVERE OBESITY DUE TO EXCESS CALORIES WITHOUT SERIOUS COMORBIDITY WITH BODY MASS INDEX (BMI) OF 50.0 TO 59.9 IN ADULT (HCC): ICD-10-CM

## 2025-02-10 DIAGNOSIS — F31.4 BIPOLAR 1 DISORDER, DEPRESSED, SEVERE (HCC): ICD-10-CM

## 2025-02-10 DIAGNOSIS — I10 ESSENTIAL HYPERTENSION: ICD-10-CM

## 2025-02-10 DIAGNOSIS — J30.2 SEASONAL ALLERGIES: ICD-10-CM

## 2025-02-10 DIAGNOSIS — F41.1 GAD (GENERALIZED ANXIETY DISORDER): ICD-10-CM

## 2025-02-10 DIAGNOSIS — J45.30 MILD PERSISTENT ASTHMA WITHOUT COMPLICATION: ICD-10-CM

## 2025-02-10 DIAGNOSIS — F31.9 BIPOLAR DEPRESSION (HCC): ICD-10-CM

## 2025-02-10 PROCEDURE — 99205 OFFICE O/P NEW HI 60 MIN: CPT | Performed by: PHYSICIAN ASSISTANT

## 2025-02-10 RX ORDER — GLYCOPYRROLATE AND FORMOTEROL FUMARATE 9; 4.8 UG/1; UG/1
2 AEROSOL, METERED RESPIRATORY (INHALATION) 2 TIMES DAILY
COMMUNITY
Start: 2024-11-14 | End: 2025-02-10

## 2025-02-10 RX ORDER — LIDOCAINE 50 MG/G
1 PATCH TOPICAL EVERY 12 HOURS
COMMUNITY
Start: 2024-06-05 | End: 2025-06-05

## 2025-02-10 RX ORDER — ATORVASTATIN CALCIUM 10 MG/1
10 TABLET, FILM COATED ORAL DAILY
Qty: 100 TABLET | Refills: 3 | Status: SHIPPED | OUTPATIENT
Start: 2025-02-10

## 2025-02-10 RX ORDER — GLYCOPYRROLATE AND FORMOTEROL FUMARATE 9; 4.8 UG/1; UG/1
2 AEROSOL, METERED RESPIRATORY (INHALATION) 2 TIMES DAILY
Qty: 10.7 G | Refills: 3 | Status: SHIPPED | OUTPATIENT
Start: 2025-02-10

## 2025-02-10 RX ORDER — LISINOPRIL 40 MG/1
40 TABLET ORAL DAILY
Qty: 100 TABLET | Refills: 3 | Status: SHIPPED | OUTPATIENT
Start: 2025-02-10

## 2025-02-10 RX ORDER — SUMATRIPTAN 50 MG/1
50 TABLET, FILM COATED ORAL ONCE AS NEEDED
Qty: 9 TABLET | Refills: 0 | Status: SHIPPED | OUTPATIENT
Start: 2025-02-10

## 2025-02-10 RX ORDER — FERROUS SULFATE 325(65) MG
325 TABLET ORAL
COMMUNITY

## 2025-02-10 RX ORDER — SUMATRIPTAN 50 MG/1
50 TABLET, FILM COATED ORAL
COMMUNITY
Start: 2024-10-02 | End: 2025-02-10 | Stop reason: SDUPTHER

## 2025-02-10 RX ORDER — NITROGLYCERIN 0.4 MG/1
0.4 TABLET SUBLINGUAL
Qty: 30 TABLET | Refills: 0 | Status: SHIPPED | OUTPATIENT
Start: 2025-02-10 | End: 2026-02-10

## 2025-02-10 NOTE — ASSESSMENT & PLAN NOTE
Patient complains of pain in both knees.  She has had gel and corticosteroid injections for her osteoarthritis.  Her BMI is 50.5 which also puts extra stress on her knees.  She states that she has bone-on-bone arthritis and needs to have bilateral knee replacements done but the surgeon will not operate on her until she loses more weight.

## 2025-02-10 NOTE — ASSESSMENT & PLAN NOTE
Patient has not been taking her blood pressure medication.  She had been on a combination medication amlodipine/atorvastatin but this medication was changed to lisinopril which she admits being nonadherent with the 40 mg of lisinopril.  She is going to take the 40 mg of lisinopril at night.  Orders:  •  Lipid panel; Future  •  Comprehensive metabolic panel; Future
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
## 2025-02-10 NOTE — ASSESSMENT & PLAN NOTE
I reviewed the method by which she should be using her Flonase nasal spray.  She feels that the oral antihistamines for seasonal allergies are not effective.

## 2025-02-10 NOTE — ASSESSMENT & PLAN NOTE
Patient has a history of B12 deficiency state secondary to her gastric bypass.  She states she was taking a multivitamin but was not taking any additional vitamin B12.  Will check the level along with a CBC.  Orders:  •  Vitamin B12; Future

## 2025-02-10 NOTE — ASSESSMENT & PLAN NOTE
Patient is status post gastric bypass and has longstanding B12 and folate deficiency.  She was taking iron every other day.  She has needed iron infusions in the past.  This has not been checked for at least the last 9 months.  Orders:  •  CBC and Platelet; Future  •  Iron Panel (Includes Ferritin, Iron Sat%, Iron, and TIBC); Future

## 2025-02-20 ENCOUNTER — TELEPHONE (OUTPATIENT)
Age: 55
End: 2025-02-20

## 2025-02-20 ENCOUNTER — HOSPITAL ENCOUNTER (OUTPATIENT)
Dept: RADIOLOGY | Facility: CLINIC | Age: 55
End: 2025-02-20
Payer: MEDICARE

## 2025-02-20 VITALS — BODY MASS INDEX: 50.5 KG/M2 | WEIGHT: 285 LBS | HEIGHT: 63 IN

## 2025-02-20 DIAGNOSIS — M17.0 PRIMARY OSTEOARTHRITIS OF BOTH KNEES: Primary | ICD-10-CM

## 2025-02-20 DIAGNOSIS — E66.813 CLASS 3 SEVERE OBESITY DUE TO EXCESS CALORIES WITHOUT SERIOUS COMORBIDITY WITH BODY MASS INDEX (BMI) OF 50.0 TO 59.9 IN ADULT (HCC): ICD-10-CM

## 2025-02-20 DIAGNOSIS — E66.01 CLASS 3 SEVERE OBESITY DUE TO EXCESS CALORIES WITHOUT SERIOUS COMORBIDITY WITH BODY MASS INDEX (BMI) OF 50.0 TO 59.9 IN ADULT (HCC): ICD-10-CM

## 2025-02-20 DIAGNOSIS — R26.2 AMBULATORY DYSFUNCTION: ICD-10-CM

## 2025-02-20 DIAGNOSIS — Z12.31 ENCOUNTER FOR SCREENING MAMMOGRAM FOR BREAST CANCER: ICD-10-CM

## 2025-02-20 PROCEDURE — 77063 BREAST TOMOSYNTHESIS BI: CPT

## 2025-02-20 PROCEDURE — 77067 SCR MAMMO BI INCL CAD: CPT

## 2025-02-20 NOTE — TELEPHONE ENCOUNTER
Donn from [facility] services for patient, called and requested two separate DME orders. The shower chair goes thru [facility] and the Scooter goes thru [facility]. The prior authorization phone number for both is [phone]. Thank you.    [facility]  [address] [place] [address]  Ph: [phone]  Fax: [phone]  (Shower chair)    [facility]  [address]  [place] [address]  Ph: [phone]  Fax: [phone]  (Scooter)

## 2025-02-21 ENCOUNTER — TELEPHONE (OUTPATIENT)
Age: 55
End: 2025-02-21

## 2025-02-21 NOTE — TELEPHONE ENCOUNTER
[facility]    [phone]  Fax- [phone]    Shower chair and portable toilet orders need to be faxed with the following documents: diagnosis codes, dated and signed, copy of insurance cards, notes for each item, and notes from [facility] all need faxed.

## 2025-02-21 NOTE — TELEPHONE ENCOUNTER
Patient reports that she got a call about a shower chair but the DME company is 2 hours away and she cannot drive that far. States she would like shower chair, a porti-potty and a scooter ordered for her. Advised that she call her insurance company to find a local DME company that is in-network for her before the orders are sent. She agreed, will call back with a name of a local DME company that is inb-network for her.

## 2025-02-24 DIAGNOSIS — R26.2 AMBULATORY DYSFUNCTION: Primary | ICD-10-CM

## 2025-02-24 NOTE — TELEPHONE ENCOUNTER
Even though we received a fax confirmation, pt called [facility] and they still never received anything. Please refax. Pt is going to call them later to check again.

## 2025-02-26 NOTE — TELEPHONE ENCOUNTER
Shana-[facility] called in pt is requesting a script for a bath stool please fax script to #[phone] Please advise

## 2025-02-27 ENCOUNTER — APPOINTMENT (OUTPATIENT)
Dept: LAB | Facility: CLINIC | Age: 55
End: 2025-02-27
Payer: MEDICARE

## 2025-02-27 DIAGNOSIS — I10 ESSENTIAL HYPERTENSION: ICD-10-CM

## 2025-02-27 DIAGNOSIS — D50.9 IRON DEFICIENCY ANEMIA, UNSPECIFIED IRON DEFICIENCY ANEMIA TYPE: ICD-10-CM

## 2025-02-27 DIAGNOSIS — E53.8 B12 DEFICIENCY: ICD-10-CM

## 2025-02-27 DIAGNOSIS — I10 PRIMARY HYPERTENSION: ICD-10-CM

## 2025-02-27 DIAGNOSIS — E78.2 MIXED HYPERLIPIDEMIA: ICD-10-CM

## 2025-02-27 DIAGNOSIS — Z11.59 NEED FOR HEPATITIS C SCREENING TEST: ICD-10-CM

## 2025-02-27 DIAGNOSIS — D50.0 IRON DEFICIENCY ANEMIA DUE TO CHRONIC BLOOD LOSS: ICD-10-CM

## 2025-02-27 DIAGNOSIS — Z11.4 SCREENING FOR HIV (HUMAN IMMUNODEFICIENCY VIRUS): ICD-10-CM

## 2025-02-27 LAB
ALBUMIN SERPL BCG-MCNC: 3.8 G/DL (ref 3.5–5)
ALP SERPL-CCNC: 87 U/L (ref 34–104)
ALT SERPL W P-5'-P-CCNC: 10 U/L (ref 7–52)
ANION GAP SERPL CALCULATED.3IONS-SCNC: 9 MMOL/L (ref 4–13)
AST SERPL W P-5'-P-CCNC: 16 U/L (ref 13–39)
BASOPHILS # BLD AUTO: 0.1 THOUSANDS/ÂΜL (ref 0–0.1)
BASOPHILS NFR BLD AUTO: 1 % (ref 0–1)
BILIRUB SERPL-MCNC: 0.38 MG/DL (ref 0.2–1)
BUN SERPL-MCNC: 20 MG/DL (ref 5–25)
CALCIUM SERPL-MCNC: 9.6 MG/DL (ref 8.4–10.2)
CHLORIDE SERPL-SCNC: 102 MMOL/L (ref 96–108)
CHOLEST SERPL-MCNC: 174 MG/DL (ref ?–200)
CO2 SERPL-SCNC: 26 MMOL/L (ref 21–32)
CREAT SERPL-MCNC: 0.83 MG/DL (ref 0.6–1.3)
EOSINOPHIL # BLD AUTO: 0.28 THOUSAND/ÂΜL (ref 0–0.61)
EOSINOPHIL NFR BLD AUTO: 4 % (ref 0–6)
ERYTHROCYTE [DISTWIDTH] IN BLOOD BY AUTOMATED COUNT: 14.1 % (ref 11.6–15.1)
FERRITIN SERPL-MCNC: 47 NG/ML (ref 11–307)
GFR SERPL CREATININE-BSD FRML MDRD: 79 ML/MIN/1.73SQ M
GLUCOSE P FAST SERPL-MCNC: 98 MG/DL (ref 65–99)
HCT VFR BLD AUTO: 41.7 % (ref 34.8–46.1)
HCV AB SER QL: NORMAL
HDLC SERPL-MCNC: 67 MG/DL
HGB BLD-MCNC: 13 G/DL (ref 11.5–15.4)
HIV 1+2 AB+HIV1 P24 AG SERPL QL IA: NORMAL
IMM GRANULOCYTES # BLD AUTO: 0.04 THOUSAND/UL (ref 0–0.2)
IMM GRANULOCYTES NFR BLD AUTO: 1 % (ref 0–2)
IRON SATN MFR SERPL: 20 % (ref 15–50)
IRON SERPL-MCNC: 76 UG/DL (ref 50–212)
LDLC SERPL CALC-MCNC: 66 MG/DL (ref 0–100)
LYMPHOCYTES # BLD AUTO: 1.99 THOUSANDS/ÂΜL (ref 0.6–4.47)
LYMPHOCYTES NFR BLD AUTO: 26 % (ref 14–44)
MCH RBC QN AUTO: 29.7 PG (ref 26.8–34.3)
MCHC RBC AUTO-ENTMCNC: 31.2 G/DL (ref 31.4–37.4)
MCV RBC AUTO: 95 FL (ref 82–98)
MONOCYTES # BLD AUTO: 0.62 THOUSAND/ÂΜL (ref 0.17–1.22)
MONOCYTES NFR BLD AUTO: 8 % (ref 4–12)
NEUTROPHILS # BLD AUTO: 4.61 THOUSANDS/ÂΜL (ref 1.85–7.62)
NEUTS SEG NFR BLD AUTO: 60 % (ref 43–75)
NONHDLC SERPL-MCNC: 107 MG/DL
NRBC BLD AUTO-RTO: 0 /100 WBCS
PLATELET # BLD AUTO: 258 THOUSANDS/UL (ref 149–390)
PMV BLD AUTO: 9.5 FL (ref 8.9–12.7)
POTASSIUM SERPL-SCNC: 4.3 MMOL/L (ref 3.5–5.3)
PROT SERPL-MCNC: 6.6 G/DL (ref 6.4–8.4)
RBC # BLD AUTO: 4.38 MILLION/UL (ref 3.81–5.12)
SODIUM SERPL-SCNC: 137 MMOL/L (ref 135–147)
TIBC SERPL-MCNC: 376.6 UG/DL (ref 250–450)
TRANSFERRIN SERPL-MCNC: 269 MG/DL (ref 203–362)
TRIGL SERPL-MCNC: 206 MG/DL (ref ?–150)
UIBC SERPL-MCNC: 301 UG/DL (ref 155–355)
VIT B12 SERPL-MCNC: 843 PG/ML (ref 180–914)
WBC # BLD AUTO: 7.64 THOUSAND/UL (ref 4.31–10.16)

## 2025-02-27 PROCEDURE — 80053 COMPREHEN METABOLIC PANEL: CPT

## 2025-02-27 PROCEDURE — 85025 COMPLETE CBC W/AUTO DIFF WBC: CPT

## 2025-02-27 PROCEDURE — 87389 HIV-1 AG W/HIV-1&-2 AB AG IA: CPT

## 2025-02-27 PROCEDURE — 36415 COLL VENOUS BLD VENIPUNCTURE: CPT

## 2025-02-27 PROCEDURE — 86803 HEPATITIS C AB TEST: CPT

## 2025-02-27 PROCEDURE — 83918 ORGANIC ACIDS TOTAL QUANT: CPT

## 2025-02-27 PROCEDURE — 83540 ASSAY OF IRON: CPT

## 2025-02-27 PROCEDURE — 82607 VITAMIN B-12: CPT

## 2025-02-27 PROCEDURE — 80061 LIPID PANEL: CPT

## 2025-02-27 PROCEDURE — 83550 IRON BINDING TEST: CPT

## 2025-02-27 PROCEDURE — 82728 ASSAY OF FERRITIN: CPT

## 2025-03-02 LAB — METHYLMALONATE SERPL-SCNC: 150 NMOL/L (ref 0–378)

## 2025-04-10 LAB — COLOGUARD RESULT REPORTABLE: NEGATIVE
